# Patient Record
Sex: MALE | Race: BLACK OR AFRICAN AMERICAN | NOT HISPANIC OR LATINO | Employment: FULL TIME | URBAN - METROPOLITAN AREA
[De-identification: names, ages, dates, MRNs, and addresses within clinical notes are randomized per-mention and may not be internally consistent; named-entity substitution may affect disease eponyms.]

---

## 2017-11-10 ENCOUNTER — APPOINTMENT (EMERGENCY)
Dept: RADIOLOGY | Facility: HOSPITAL | Age: 52
End: 2017-11-10
Payer: COMMERCIAL

## 2017-11-10 ENCOUNTER — HOSPITAL ENCOUNTER (EMERGENCY)
Facility: HOSPITAL | Age: 52
Discharge: HOME | End: 2017-11-10
Attending: EMERGENCY MEDICINE | Admitting: EMERGENCY MEDICINE
Payer: COMMERCIAL

## 2017-11-10 VITALS
TEMPERATURE: 98.5 F | HEART RATE: 79 BPM | RESPIRATION RATE: 18 BRPM | DIASTOLIC BLOOD PRESSURE: 70 MMHG | WEIGHT: 220 LBS | OXYGEN SATURATION: 97 % | BODY MASS INDEX: 25.98 KG/M2 | HEIGHT: 77 IN | SYSTOLIC BLOOD PRESSURE: 128 MMHG

## 2017-11-10 DIAGNOSIS — K21.9 GERD (GASTROESOPHAGEAL REFLUX DISEASE): Primary | ICD-10-CM

## 2017-11-10 LAB
ALBUMIN SERPL BCP-MCNC: 3.3 G/DL (ref 3.5–5)
ALP SERPL-CCNC: 57 U/L (ref 46–116)
ALT SERPL W P-5'-P-CCNC: 31 U/L (ref 12–78)
ANION GAP SERPL CALCULATED.3IONS-SCNC: 9 MMOL/L (ref 4–13)
APTT PPP: 30 SECONDS (ref 23–35)
AST SERPL W P-5'-P-CCNC: 21 U/L (ref 5–45)
BASOPHILS # BLD AUTO: 0 THOUSANDS/ΜL (ref 0–0.1)
BASOPHILS NFR BLD AUTO: 0 % (ref 0–1)
BILIRUB SERPL-MCNC: 0.6 MG/DL (ref 0.2–1)
BUN SERPL-MCNC: 12 MG/DL (ref 5–25)
CALCIUM SERPL-MCNC: 9.1 MG/DL (ref 8.3–10.1)
CHLORIDE SERPL-SCNC: 100 MMOL/L (ref 100–108)
CO2 SERPL-SCNC: 26 MMOL/L (ref 21–32)
CREAT SERPL-MCNC: 1.11 MG/DL (ref 0.6–1.3)
DEPRECATED D DIMER PPP: 650 NG/ML (FEU) (ref 190–520)
EOSINOPHIL # BLD AUTO: 0.1 THOUSAND/ΜL (ref 0–0.61)
EOSINOPHIL NFR BLD AUTO: 1 % (ref 0–6)
ERYTHROCYTE [DISTWIDTH] IN BLOOD BY AUTOMATED COUNT: 14.6 % (ref 11.6–15.1)
GFR SERPL CREATININE-BSD FRML MDRD: 88 ML/MIN/1.73SQ M
GLUCOSE SERPL-MCNC: 118 MG/DL (ref 65–140)
HCT VFR BLD AUTO: 43.8 % (ref 42–52)
HGB BLD-MCNC: 14.1 G/DL (ref 14–18)
INR PPP: 1.04 (ref 0.86–1.16)
LIPASE SERPL-CCNC: 150 U/L (ref 73–393)
LYMPHOCYTES # BLD AUTO: 2 THOUSANDS/ΜL (ref 0.6–4.47)
LYMPHOCYTES NFR BLD AUTO: 20 % (ref 14–44)
MCH RBC QN AUTO: 27.5 PG (ref 27–31)
MCHC RBC AUTO-ENTMCNC: 32.2 G/DL (ref 31.4–37.4)
MCV RBC AUTO: 85 FL (ref 82–98)
MONOCYTES # BLD AUTO: 0.5 THOUSAND/ΜL (ref 0.17–1.22)
MONOCYTES NFR BLD AUTO: 5 % (ref 4–12)
NEUTROPHILS # BLD AUTO: 7.7 THOUSANDS/ΜL (ref 1.85–7.62)
NEUTS SEG NFR BLD AUTO: 75 % (ref 43–75)
NRBC BLD AUTO-RTO: 0 /100 WBCS
PLATELET # BLD AUTO: 232 THOUSANDS/UL (ref 130–400)
PMV BLD AUTO: 7.5 FL (ref 8.9–12.7)
POTASSIUM SERPL-SCNC: 3.4 MMOL/L (ref 3.5–5.3)
PROT SERPL-MCNC: 7.5 G/DL (ref 6.4–8.2)
PROTHROMBIN TIME: 10.9 SECONDS (ref 9.4–11.7)
RBC # BLD AUTO: 5.12 MILLION/UL (ref 4.7–6.1)
SODIUM SERPL-SCNC: 135 MMOL/L (ref 136–145)
TROPONIN I SERPL-MCNC: <0.02 NG/ML
WBC # BLD AUTO: 10.4 THOUSAND/UL (ref 4.8–10.8)

## 2017-11-10 PROCEDURE — 71010 HB CHEST X-RAY 1 VIEW FRONTAL (PORTABLE): CPT

## 2017-11-10 PROCEDURE — 74175 CTA ABDOMEN W/CONTRAST: CPT

## 2017-11-10 PROCEDURE — 99285 EMERGENCY DEPT VISIT HI MDM: CPT

## 2017-11-10 PROCEDURE — 85025 COMPLETE CBC W/AUTO DIFF WBC: CPT | Performed by: EMERGENCY MEDICINE

## 2017-11-10 PROCEDURE — 93005 ELECTROCARDIOGRAM TRACING: CPT | Performed by: EMERGENCY MEDICINE

## 2017-11-10 PROCEDURE — 80053 COMPREHEN METABOLIC PANEL: CPT | Performed by: EMERGENCY MEDICINE

## 2017-11-10 PROCEDURE — 36415 COLL VENOUS BLD VENIPUNCTURE: CPT | Performed by: EMERGENCY MEDICINE

## 2017-11-10 PROCEDURE — C9113 INJ PANTOPRAZOLE SODIUM, VIA: HCPCS | Performed by: EMERGENCY MEDICINE

## 2017-11-10 PROCEDURE — 84484 ASSAY OF TROPONIN QUANT: CPT | Performed by: EMERGENCY MEDICINE

## 2017-11-10 PROCEDURE — 85610 PROTHROMBIN TIME: CPT | Performed by: EMERGENCY MEDICINE

## 2017-11-10 PROCEDURE — 85730 THROMBOPLASTIN TIME PARTIAL: CPT | Performed by: EMERGENCY MEDICINE

## 2017-11-10 PROCEDURE — 83690 ASSAY OF LIPASE: CPT | Performed by: EMERGENCY MEDICINE

## 2017-11-10 PROCEDURE — 96361 HYDRATE IV INFUSION ADD-ON: CPT

## 2017-11-10 PROCEDURE — 85379 FIBRIN DEGRADATION QUANT: CPT | Performed by: EMERGENCY MEDICINE

## 2017-11-10 PROCEDURE — 71275 CT ANGIOGRAPHY CHEST: CPT

## 2017-11-10 PROCEDURE — 96374 THER/PROPH/DIAG INJ IV PUSH: CPT

## 2017-11-10 RX ORDER — PANTOPRAZOLE SODIUM 20 MG/1
40 TABLET, DELAYED RELEASE ORAL DAILY
Qty: 10 TABLET | Refills: 0 | Status: ON HOLD | OUTPATIENT
Start: 2017-11-10 | End: 2017-12-22

## 2017-11-10 RX ORDER — MAGNESIUM HYDROXIDE/ALUMINUM HYDROXICE/SIMETHICONE 120; 1200; 1200 MG/30ML; MG/30ML; MG/30ML
30 SUSPENSION ORAL ONCE
Status: COMPLETED | OUTPATIENT
Start: 2017-11-10 | End: 2017-11-10

## 2017-11-10 RX ORDER — PANTOPRAZOLE SODIUM 40 MG/1
40 INJECTION, POWDER, FOR SOLUTION INTRAVENOUS ONCE
Status: COMPLETED | OUTPATIENT
Start: 2017-11-10 | End: 2017-11-10

## 2017-11-10 RX ORDER — ONDANSETRON 2 MG/ML
4 INJECTION INTRAMUSCULAR; INTRAVENOUS ONCE
Status: COMPLETED | OUTPATIENT
Start: 2017-11-10 | End: 2017-11-10

## 2017-11-10 RX ADMIN — ONDANSETRON 4 MG: 2 INJECTION INTRAMUSCULAR; INTRAVENOUS at 11:19

## 2017-11-10 RX ADMIN — IOHEXOL 100 ML: 350 INJECTION, SOLUTION INTRAVENOUS at 11:56

## 2017-11-10 RX ADMIN — LIDOCAINE HYDROCHLORIDE 15 ML: 20 SOLUTION ORAL; TOPICAL at 13:51

## 2017-11-10 RX ADMIN — ALUMINUM HYDROXIDE, MAGNESIUM HYDROXIDE, AND SIMETHICONE 30 ML: 200; 200; 20 SUSPENSION ORAL at 11:20

## 2017-11-10 RX ADMIN — PANTOPRAZOLE SODIUM 40 MG: 40 INJECTION, POWDER, FOR SOLUTION INTRAVENOUS at 13:52

## 2017-11-10 RX ADMIN — SODIUM CHLORIDE 1000 ML: 0.9 INJECTION, SOLUTION INTRAVENOUS at 10:53

## 2017-11-10 NOTE — DISCHARGE INSTRUCTIONS
Gastroesophageal Reflux Disease   WHAT YOU NEED TO KNOW:   Gastroesophageal reflux occurs when acid and food in the stomach back up into the esophagus  Gastroesophageal reflux disease (GERD) is reflux that occurs more than twice a week for a few weeks  It usually causes heartburn and other symptoms  GERD can cause other health problems over time if it is not treated  DISCHARGE INSTRUCTIONS:   Seek care immediately if:   · You feel full and cannot burp or vomit  · You have severe chest pain and sudden trouble breathing  · Your bowel movements are black, bloody, or tarry-looking  · Your vomit looks like coffee grounds or has blood in it  Contact your healthcare provider if:   · You vomit large amounts, or you vomit often  · You have trouble breathing after you vomit  · You have trouble swallowing, or pain with swallowing  · You are losing weight without trying  · Your symptoms get worse or do not improve with treatment  · You have questions or concerns about your condition or care  Medicines:   · Medicines  are used to decrease stomach acid  Medicine may also be used to help your lower esophageal sphincter and stomach contract (tighten) more  · Take your medicine as directed  Contact your healthcare provider if you think your medicine is not helping or if you have side effects  Tell him or her if you are allergic to any medicine  Keep a list of the medicines, vitamins, and herbs you take  Include the amounts, and when and why you take them  Bring the list or the pill bottles to follow-up visits  Carry your medicine list with you in case of an emergency  Manage GERD:   · Do not have foods or drinks that may increase heartburn  These include chocolate, peppermint, fried or fatty foods, drinks that contain caffeine, or carbonated drinks (soda)  Other foods include spicy foods, onions, tomatoes, and tomato-based foods   Do not have foods or drinks that can irritate your esophagus, Message forwarded to Adina in billing regarding shared success program.  Patient does not meet or qualify for the shared success program.   such as citrus fruits, juices, and alcohol  · Do not eat large meals  When you eat a lot of food at one time, your stomach needs more acid to digest it  Eat 6 small meals each day instead of 3 large ones, and eat slowly  Do not eat meals 2 to 3 hours before bedtime  · Elevate the head of your bed  Place 6-inch blocks under the head of your bed frame  You may also use more than one pillow under your head and shoulders while you sleep  · Maintain a healthy weight  If you are overweight, weight loss may help relieve symptoms of GERD  · Do not smoke  Smoking weakens the lower esophageal sphincter and increases the risk of GERD  Ask your healthcare provider for information if you currently smoke and need help to quit  E-cigarettes or smokeless tobacco still contain nicotine  Talk to your healthcare provider before you use these products  · Do not wear clothing that is tight around your waist   Tight clothing can put pressure on your stomach and cause or worsen GERD symptoms  Follow up with your healthcare provider as directed:  Write down your questions so you remember to ask them during your visits  © 2017 [employer] Information is for End User's use only and may not be sold, redistributed or otherwise used for commercial purposes  All illustrations and images included in CareNotes® are the copyrighted property of A D A M , Inc  or Colby Pinto  The above information is an  only  It is not intended as medical advice for individual conditions or treatments  Talk to your doctor, nurse or pharmacist before following any medical regimen to see if it is safe and effective for you

## 2017-11-10 NOTE — ED PROCEDURE NOTE
PROCEDURE  ECG 12 Lead Documentation  Date/Time: 11/10/2017 10:47 AM  Performed by: Janay Norris  Authorized by: Janay Norris     ECG reviewed by me, the ED Provider: yes    Patient location:  ED  Interpretation:     Interpretation: normal    Rate:     ECG rate:  88    ECG rate assessment: normal    Rhythm:     Rhythm: sinus rhythm    Ectopy:     Ectopy: none    QRS:     QRS axis:  Normal    QRS intervals:  Normal  Conduction:     Conduction: normal    ST segments:     ST segments:  Normal  T waves:     T waves: normal

## 2017-11-10 NOTE — ED PROVIDER NOTES
History  Chief Complaint   Patient presents with    Chest Pain     pt c/o mid chest pain that radiates to stomach & SOB since Monday  c/o being "gassy" since onset  Patient presents for evaluation of epigastric pain radiating to the chest and around to the back since Monday  Patient reports increased gas and bleching gives temporary relief  No other modifying factors  Has not taken anything for the pain  No dyspnea  History provided by:  Patient   used: No    Chest Pain   Associated symptoms: abdominal pain        None       History reviewed  No pertinent past medical history  History reviewed  No pertinent surgical history  History reviewed  No pertinent family history  I have reviewed and agree with the history as documented  Social History   Substance Use Topics    Smoking status: Current Every Day Smoker    Smokeless tobacco: Never Used    Alcohol use Yes        Review of Systems   Cardiovascular: Positive for chest pain  Gastrointestinal: Positive for abdominal pain  All other systems reviewed and are negative  Physical Exam  ED Triage Vitals [11/10/17 1038]   Temperature Pulse Respirations Blood Pressure SpO2   98 5 °F (36 9 °C) 92 20 156/89 96 %      Temp Source Heart Rate Source Patient Position - Orthostatic VS BP Location FiO2 (%)   Oral Monitor Lying Left arm --      Pain Score       6           Orthostatic Vital Signs  Vitals:    11/10/17 1038   BP: 156/89   Pulse: 92   Patient Position - Orthostatic VS: Lying       Physical Exam   Constitutional: He is oriented to person, place, and time  No distress  HENT:   Mouth/Throat: Oropharynx is clear and moist    Eyes: Pupils are equal, round, and reactive to light  Neck: Normal range of motion  Cardiovascular: Normal rate, regular rhythm and intact distal pulses  Pulmonary/Chest: Effort normal and breath sounds normal  No respiratory distress  Abdominal: Soft   He exhibits no distension and no mass  There is tenderness  There is no rebound  Mild epigastric tenderness   Musculoskeletal: Normal range of motion  Neurological: He is alert and oriented to person, place, and time  Skin: Capillary refill takes less than 2 seconds  He is not diaphoretic  Nursing note and vitals reviewed  ED Medications  Medications   ondansetron (ZOFRAN) injection 4 mg (4 mg Intravenous Given 11/10/17 1119)   aluminum-magnesium hydroxide-simethicone (MYLANTA) 200-200-20 mg/5 mL oral suspension 30 mL (30 mL Oral Given 11/10/17 1120)   sodium chloride 0 9 % bolus 1,000 mL (0 mL Intravenous Stopped 11/10/17 1207)   iohexol (OMNIPAQUE) 350 MG/ML injection (MULTI-DOSE) 100 mL (100 mL Intravenous Given 11/10/17 1156)   pantoprazole (PROTONIX) injection 40 mg (40 mg Intravenous Given 11/10/17 1352)   lidocaine viscous (XYLOCAINE) 2 % mucosal solution 15 mL (15 mL Swish & Swallow Given 11/10/17 1351)       Diagnostic Studies  Results Reviewed     Procedure Component Value Units Date/Time    Troponin I [[id number]]  (Normal) Collected:  11/10/17 1052    Lab Status:  Final result Specimen:  Blood from Arm, Left Updated:  11/10/17 1142     Troponin I <0 02 ng/mL     Narrative:         [employer] Chemistry analyzer 99% cutoff is > 0 04 ng/mL in network labs    o cTnI 99% cutoff is useful only when applied to patients in the clinical setting of myocardial ischemia  o cTnI 99% cutoff should be interpreted in the context of clinical history, ECG findings and possibly cardiac imaging to establish correct diagnosis  o cTnI 99% cutoff may be suggestive but clearly not indicative of a coronary event without the clinical setting of myocardial ischemia      Comprehensive metabolic panel [[id number]]  (Abnormal) Collected:  11/10/17 1052    Lab Status:  Final result Specimen:  Blood from Arm, Left Updated:  11/10/17 1137     Sodium 135 (L) mmol/L      Potassium 3 4 (L) mmol/L      Chloride 100 mmol/L      CO2 26 mmol/L      Anion Gap 9 mmol/L BUN 12 mg/dL      Creatinine 1 11 mg/dL      Glucose 118 mg/dL      Calcium 9 1 mg/dL      AST 21 U/L      ALT 31 U/L      Alkaline Phosphatase 57 U/L      Total Protein 7 5 g/dL      Albumin 3 3 (L) g/dL      Total Bilirubin 0 60 mg/dL      eGFR 88 ml/min/1 73sq m     Narrative:         National Kidney Disease Education Program recommendations are as follows:  GFR calculation is accurate only with a steady state creatinine  Chronic Kidney disease less than 60 ml/min/1 73 sq  meters  Kidney failure less than 15 ml/min/1 73 sq  meters  Lipase [[id number]]  (Normal) Collected:  11/10/17 1052    Lab Status:  Final result Specimen:  Blood from Arm, Left Updated:  11/10/17 1137     Lipase 150 u/L     Protime-INR [[id number]]  (Normal) Collected:  11/10/17 1052    Lab Status:  Final result Specimen:  Blood from Arm, Left Updated:  11/10/17 1136     Protime 10 9 seconds      INR 1 04    APTT [[id number]]  (Normal) Collected:  11/10/17 1052    Lab Status:  Final result Specimen:  Blood from Arm, Left Updated:  11/10/17 1136     PTT 30 seconds     Narrative:          Therapeutic Heparin Range = 60-90 seconds    D-Dimer [[id number]]  (Abnormal) Collected:  11/10/17 1052    Lab Status:  Final result Specimen:  Blood from Arm, Left Updated:  11/10/17 1136     D-Dimer, Quant 650 (H) ng/ml (FEU)     CBC and differential [[id number]]  (Abnormal) Collected:  11/10/17 1052    Lab Status:  Final result Specimen:  Blood from Arm, Left Updated:  11/10/17 1114     WBC 10 40 Thousand/uL      RBC 5 12 Million/uL      Hemoglobin 14 1 g/dL      Hematocrit 43 8 %      MCV 85 fL      MCH 27 5 pg      MCHC 32 2 g/dL      RDW 14 6 %      MPV 7 5 (L) fL      Platelets 860 Thousands/uL      nRBC 0 /100 WBCs      Neutrophils Relative 75 %      Lymphocytes Relative 20 %      Monocytes Relative 5 %      Eosinophils Relative 1 %      Basophils Relative 0 %      Neutrophils Absolute 7 70 (H) Thousands/µL      Lymphocytes Absolute 2 00 Thousands/µL Monocytes Absolute 0 50 Thousand/µL      Eosinophils Absolute 0 10 Thousand/µL      Basophils Absolute 0 00 Thousands/µL                  XR chest 1 view portable   Final Result by Amanda Dawson DO (11/10 1312)      No active pulmonary disease  Workstation performed: [id number]         CTA dissection protocol chest and abdomen   Final Result by Becca Sandy MD (11/10 1300)      No evidence of thoracic aortic dissection and no evidence of pulmonary embolism   Centrilobular emphysema      Thickening of the thoracic esophagus noted  This is of unclear etiology may be due to reflux disease / esophagitis  Correlation with the upper GI endoscopy suggested for definite characterization    ##imslh##imslh       ##fuslh3##fuslh3         Workstation performed: [id number]                    Procedures  Procedures       Phone Contacts  ED Phone Contact    ED Course  ED Course                                MDM  Number of Diagnoses or Management Options  GERD (gastroesophageal reflux disease):   Diagnosis management comments: ER work up was remarkable for esophagitis on CT scan  Base don history and physical symptoms were consistent with GERD being the source of his symptoms  Some relief with medication sin ER  Advised follow up with PMD and GI  At this time I do not feel his symptoms are cardiac          Amount and/or Complexity of Data Reviewed  Clinical lab tests: ordered and reviewed  Tests in the radiology section of CPT®: ordered and reviewed  Independent visualization of images, tracings, or specimens: yes    Patient Progress  Patient progress: stable    CritCare Time    Disposition  Final diagnoses:   GERD (gastroesophageal reflux disease)     Time reflects when diagnosis was documented in both MDM as applicable and the Disposition within this note     Time User Action Codes Description Comment    11/10/2017  1:28 PM Irineo Millan Ettataneelima [K21 9] GERD (gastroesophageal reflux disease)       ED Disposition ED Disposition Condition Comment    Discharge  Miguel Doss discharge to home/self care  Condition at discharge: stable        Follow-up Information     Follow up With Specialties Details Why Camille Teague MD Gastroenterology In 3 days  [address]  [phone]      [employer]   [phone]          Patient's Medications   Discharge Prescriptions    PANTOPRAZOLE (PROTONIX) 20 MG TABLET    Take 2 tablets by mouth daily for 10 doses       Start Date: 11/10/2017End Date: 11/20/2017       Order Dose: 40 mg       Quantity: 10 tablet    Refills: 0     No discharge procedures on file      ED Provider  Electronically Signed by           Lamonte Curry DO  11/10/17 9544

## 2017-11-10 NOTE — ED NOTES
Pt c/o constant  mid chest pain radiating to abdomen & sometimes to his side (pointing to right side) since Monday       Naz Flowers RN  11/10/17 5823

## 2017-11-13 ENCOUNTER — GENERIC CONVERSION - ENCOUNTER (OUTPATIENT)
Dept: OTHER | Facility: OTHER | Age: 52
End: 2017-11-13

## 2017-11-13 LAB
ATRIAL RATE: 88 BPM
P AXIS: 55 DEGREES
PR INTERVAL: 156 MS
QRS AXIS: 48 DEGREES
QRSD INTERVAL: 96 MS
QT INTERVAL: 358 MS
QTC INTERVAL: 433 MS
T WAVE AXIS: 29 DEGREES
VENTRICULAR RATE: 88 BPM

## 2017-11-15 ENCOUNTER — GENERIC CONVERSION - ENCOUNTER (OUTPATIENT)
Dept: OTHER | Facility: OTHER | Age: 52
End: 2017-11-15

## 2017-11-21 ENCOUNTER — GENERIC CONVERSION - ENCOUNTER (OUTPATIENT)
Dept: OTHER | Facility: OTHER | Age: 52
End: 2017-11-21

## 2017-11-21 ENCOUNTER — ALLSCRIPTS OFFICE VISIT (OUTPATIENT)
Dept: OTHER | Facility: OTHER | Age: 52
End: 2017-11-21

## 2017-11-22 NOTE — CONSULTS
Assessment  1  Tinea pedis (110 4) (B35 3)   2  Onychomycosis (110 1) (B35 1)   3  Acquired hallux valgus of both feet (735 0) (M20 11,M20 12)    Plan     · Clotrimazole-Betamethasone 1-0 05 % External Cream; APPLY  AND RUB  IN ATHIN FILM TO AFFECTED AREAS TWICE DAILY  (AM AND PM)   · Terbinafine HCl - 250 MG Oral Tablet; TAKE 1 TABLET DAILY   · Follow-up visit in 1 month Evaluation and Treatment  Follow-up  Status: Hold For -Scheduling  Requested for: [id number]   · It is important to take good care of your feet ; Status:Complete;   Done: [id number]    Discussion/Summary    Patient will not start oral medication until this is cleared by GI discussed risks of GI upset and GI bleed  Discussed side effects of medication including risk of liver damage  Liver function reviewed within normal limits  Patient will apply antifungal cream twice a day and anti fungal powder daily  Patient will apply Lysol spray his shoes at night  Patient will not drink alcohol on Medication  The patient was counseled regarding instructions for management,-- patient and family education  Chief Complaint  Patient has had chronic itching peeling and blistering of feet are off for the last 6 years  Has tried numerous over-the-counter topical medicines and creams  Patient also has nail fungus for the past several years  Patient has large bunions bilateral but they are rarely painful  Patient has recently also be getting some blisters on hands  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  History of Present Illness  HPI: Patient has a family history of liver cancer  Patient has stopped drinking since he has had peptic ulcer disease  Patient has been started Prilosec  Patient has a follow-up with GI doctor next week  Active Problems  1  Abdominal pain (789 00) (R10 9)   2  Change in bowel habits (787 99) (R19 4)   3  Colon cancer screening (V76 51) (Z12 11)   4   GERD (gastroesophageal reflux disease) (530 81) (K21 9)   5  Nausea and/or vomiting (787 01) (R11 2)   6  Nicotine dependence (305 1) (F17 200)   7  Palpitations (785 1) (R00 2)   8  Rash (782 1) (R21)   9  Shortness of breath (786 05) (R06 02)    Surgical History   · Denied: History Of Prior Surgery    Family History  Mother    · Denied: Family history of colon cancer   · Denied: Family history of colonic polyps   · Family history of liver cancer (V16 0) (Z80 0)   · Denied: Family history of liver disease  Father    · Denied: Family history of colon cancer   · Denied: Family history of colonic polyps   · Family history of liver cancer (V16 0) (Z80 0)   · Denied: Family history of liver disease    Social History     · Current every day smoker (305 1) (F17 200)   · Feels safe at home   · Social alcohol use (Z78 9)    Current Meds   1  Pantoprazole Sodium 40 MG Oral Tablet Delayed Release; take 1 tablet every twelve hours; Therapy: 82XWC1409 to (Evaluate:03Gav4944)  Requested for: 01CGW4490; Last Rx:46Tdh9027 Ordered   2  Suprep Bowel Prep Kit 17 5-3 13-1 6 GM/180ML Oral Solution; DILUTE CONTENTS AND USE AS DIRECTED FOR BOWEL PREP; Therapy: 84JRB9019 to (Last Rx:22Fft0668)  Requested for: 87JMX3829 Ordered    The medication list was reviewed and updated today  Allergies  1  No Known Drug Allergies    Vitals   Recorded: 65ZKK1776 25:63IY   Systolic 286   Diastolic 77   Height 5 ft 5 in   Weight 215 lb    BMI Calculated 35 78   BSA Calculated 2 04     Physical Exam  Left Foot: Appearance: Normal except as noted: excessive pronation-- and-- pes planus  Great toe deformities include a bunion  There are resolving blisters of plantar feet bilateral  Itching peeling and redness chronic tinea pedis thickening of plantar skin  Special Tests: moderate onychomycosis there is thickening and dystrophy toenails  Right Foot: Appearance: Normal except as noted: excessive pronation-- and-- pes planus  Great toe deformities include a bunion   Significant bunion bilateral decreased range of motion 1st MPJ no pain on range of motion  Neurological Exam: performed  Light touch was intact bilaterally  Vibratory sensation was intact bilaterally  Vascular Exam: performed Dorsalis pedis pulses were 2/4 bilaterally  Posterior tibial pulses were 1/4 bilaterally  Capillary refill time was between 1-3 seconds bilaterally  Future Appointments    Date/Time Provider Specialty Site   12/18/2017 03:30 PM Sumaya Ritchie MD [address] [facility]   12/22/2017 10:30 AM TANNER Kearns  Gastroenterology [facility]       Signatures   Electronically signed by :  Alix Leon DPM; Nov 21 2017 10:39AM EST                       (Author)

## 2017-12-18 ENCOUNTER — ALLSCRIPTS OFFICE VISIT (OUTPATIENT)
Dept: OTHER | Facility: OTHER | Age: 52
End: 2017-12-18

## 2017-12-19 NOTE — PRE-PROCEDURE INSTRUCTIONS
Pre-Surgery Instructions:   Medication Instructions    pantoprazole (PROTONIX) 20 mg tablet Patient was instructed by Physician and understands

## 2017-12-21 ENCOUNTER — ANESTHESIA EVENT (OUTPATIENT)
Dept: GASTROENTEROLOGY | Facility: AMBULARY SURGERY CENTER | Age: 52
End: 2017-12-21
Payer: COMMERCIAL

## 2017-12-21 NOTE — ANESTHESIA PREPROCEDURE EVALUATION
Review of Systems/Medical History  Patient summary reviewed  Chart reviewed      Cardiovascular   Pulmonary  Smoker cigarette smoker , ,        GI/Hepatic    GERD ,             Endo/Other     GYN       Hematology   Musculoskeletal       Neurology   Psychology           Physical Exam    Airway    Mallampati score: II  TM Distance: >3 FB  Neck ROM: full     Dental   upper dentures and lower dentures,     Cardiovascular  Rhythm: regular, Rate: normal,     Pulmonary  Breath sounds clear to auscultation,     Other Findings        Anesthesia Plan  ASA Score- 2     Anesthesia Type- IV sedation with anesthesia with ASA Monitors  Additional Monitors:   Airway Plan:         Plan Factors-    Induction- intravenous  Postoperative Plan-     Informed Consent- Anesthetic plan and risks discussed with patient  I personally reviewed this patient with the CRNA  Discussed and agreed on the Anesthesia Plan with the CRNA  Amy Yu

## 2017-12-22 ENCOUNTER — GENERIC CONVERSION - ENCOUNTER (OUTPATIENT)
Dept: OTHER | Facility: OTHER | Age: 52
End: 2017-12-22

## 2017-12-22 ENCOUNTER — GENERIC CONVERSION - ENCOUNTER (OUTPATIENT)
Dept: GASTROENTEROLOGY | Facility: CLINIC | Age: 52
End: 2017-12-22

## 2017-12-22 ENCOUNTER — ALLSCRIPTS OFFICE VISIT (OUTPATIENT)
Dept: OTHER | Facility: OTHER | Age: 52
End: 2017-12-22

## 2017-12-22 ENCOUNTER — ANESTHESIA (OUTPATIENT)
Dept: GASTROENTEROLOGY | Facility: AMBULARY SURGERY CENTER | Age: 52
End: 2017-12-22
Payer: COMMERCIAL

## 2017-12-22 ENCOUNTER — HOSPITAL ENCOUNTER (OUTPATIENT)
Facility: AMBULARY SURGERY CENTER | Age: 52
Setting detail: OUTPATIENT SURGERY
Discharge: HOME | End: 2017-12-22
Attending: INTERNAL MEDICINE | Admitting: INTERNAL MEDICINE
Payer: COMMERCIAL

## 2017-12-22 VITALS
DIASTOLIC BLOOD PRESSURE: 91 MMHG | HEART RATE: 63 BPM | RESPIRATION RATE: 18 BRPM | TEMPERATURE: 96.1 F | SYSTOLIC BLOOD PRESSURE: 133 MMHG | OXYGEN SATURATION: 99 % | WEIGHT: 218 LBS | BODY MASS INDEX: 25.85 KG/M2

## 2017-12-22 DIAGNOSIS — K21.9 GASTRO-ESOPHAGEAL REFLUX DISEASE WITHOUT ESOPHAGITIS: ICD-10-CM

## 2017-12-22 DIAGNOSIS — R19.4 CHANGE IN BOWEL HABITS: ICD-10-CM

## 2017-12-22 PROCEDURE — 88305 TISSUE EXAM BY PATHOLOGIST: CPT | Performed by: INTERNAL MEDICINE

## 2017-12-22 PROCEDURE — 88342 IMHCHEM/IMCYTCHM 1ST ANTB: CPT | Performed by: INTERNAL MEDICINE

## 2017-12-22 RX ORDER — PROPOFOL 10 MG/ML
INJECTION, EMULSION INTRAVENOUS AS NEEDED
Status: DISCONTINUED | OUTPATIENT
Start: 2017-12-22 | End: 2017-12-22 | Stop reason: SURG

## 2017-12-22 RX ORDER — PANTOPRAZOLE SODIUM 20 MG/1
20 TABLET, DELAYED RELEASE ORAL DAILY
COMMUNITY
End: 2018-12-21

## 2017-12-22 RX ORDER — SODIUM CHLORIDE 9 MG/ML
75 INJECTION, SOLUTION INTRAVENOUS CONTINUOUS
Status: DISCONTINUED | OUTPATIENT
Start: 2017-12-22 | End: 2017-12-22 | Stop reason: HOSPADM

## 2017-12-22 RX ADMIN — SODIUM CHLORIDE 75 ML/HR: 0.9 INJECTION, SOLUTION INTRAVENOUS at 09:58

## 2017-12-22 RX ADMIN — PROPOFOL 100 MG: 10 INJECTION, EMULSION INTRAVENOUS at 10:43

## 2017-12-22 RX ADMIN — PROPOFOL 50 MG: 10 INJECTION, EMULSION INTRAVENOUS at 10:48

## 2017-12-22 RX ADMIN — PROPOFOL 50 MG: 10 INJECTION, EMULSION INTRAVENOUS at 10:58

## 2017-12-22 RX ADMIN — PROPOFOL 100 MG: 10 INJECTION, EMULSION INTRAVENOUS at 10:53

## 2017-12-22 NOTE — OP NOTE
Colonoscopy Procedure Note    Procedure: Colonoscopy    Sedation: Monitored anesthesia care, check anesthesia records      ASA Class: 2    INDICATIONS: Screening for Colon Cancer, family history of colon cancer  POST-OP DIAGNOSIS: See the impression below    Procedure Details     Prior colonoscopy: No prior colonoscopy  Informed consent was obtained for the procedure, including sedation  Risks of perforation, hemorrhage, adverse drug reaction and aspiration were discussed  The patient was placed in the left lateral decubitus position  Based on the pre-procedure assessment, including review of the patient's medical history, medications, allergies, and review of systems, he had been deemed to be an appropriate candidate for conscious sedation; he was therefore sedated with the medications listed below  The patient was monitored continuously with telemetry, pulse oximetry, blood pressure monitoring, and direct observations  A rectal examination was performed  The variable-stiffness pediatric colonoscope was inserted into the rectum and advanced under direct vision to the cecum, which was identified by the ileocecal valve and appendiceal orifice  The quality of the colonic preparation was good  A careful inspection was made as the colonoscope was withdrawn, including a retroflexed view of the rectum; findings and interventions are described below  Findings:  1   3-4 mm cecal polyp removed using biopsy forceps  2   3 mm ascending colon polyp removed using biopsy forceps  3   Mild sigmoid diverticulosis  4   Retroflexion was performed and revealed large internal hemorrhoids  Complications:  None; patient tolerated the procedure well  Impression:    1  Two colon polyps  2   Mild diverticulosis  3   Large internal hemorrhoids  Recommendations:  Repeat colonoscopy in 5 years or sooner if clinically indicated      Repeat colonoscopy is being recommended at an interval of less than 10 years, this is because of a family history of adenomatous polyps or colon cancer  Follow up biopsy results in 2-3 weeks

## 2017-12-22 NOTE — H&P
History and Physical -  [facility]  Magdiel Hall 47 y o  male MRN: [id number]    HPI: Magdiel Hall is a 47y o  year old male who presents with dyspepsia symptoms and colon cancer screening      Review of Systems    Historical Information   Past Medical History:   Diagnosis Date    GERD (gastroesophageal reflux disease)      Past Surgical History:   Procedure Laterality Date    NO PAST SURGERIES       Social History   History   Alcohol Use    Yes     Comment: socially     History   Drug Use No     History   Smoking Status    Current Every Day Smoker    Packs/day: 1 00    Years: 20 00   Smokeless Tobacco    Never Used     Family History   Problem Relation Age of Onset    Cancer Mother      stomach    Hypertension Mother     Cancer Father      colon    Hypertension Sister     No Known Problems Brother     No Known Problems Daughter     Leukemia Son     No Known Problems Brother     No Known Problems Daughter     No Known Problems Son        Meds/Allergies     Prescriptions Prior to Admission   Medication    pantoprazole (PROTONIX) 20 mg tablet       No Known Allergies    Objective     Blood pressure 121/87, pulse 75, temperature (!) 96 1 °F (35 6 °C), temperature source Tympanic, resp  rate 18, weight 98 9 kg (218 lb), SpO2 94 %  PHYSICAL EXAM    Gen: NAD  CV: RRR  CHEST: Clear  ABD: soft, NT/ND  EXT: no edema  Neuro: AAO      ASSESSMENT/PLAN:  This is a 47y o  year old male here for evaluation of dyspepsia symptoms and colon cancer screening    PLAN:   Procedure:  EGD and colonoscopy

## 2017-12-23 NOTE — PROGRESS NOTES
Assessment   1  GERD (gastroesophageal reflux disease) (289 59) (K21 9)    Plan   GERD (gastroesophageal reflux disease)    · Carafate 1 GM/10ML Oral Suspension; TAKE 10 ML 4 TIMES DAILY   · Princess - Candi Delgado MD, Ryanne Sullivan (Gastroenterology) Co-Management  Acid reflux refractory to PPi    administration  Status: Canceled  Care Summary provided  : Yes    Discussion/Summary      52-y/ o male presents for evaluation of increased epigastric pain likely 2/2 to acid reflux  Pain is refractory to pantoprazole 40 milligrams b i d  Patient has evaluation with Gastroenterology and scheduled EGD/colonoscopy  Differential includes peptic ulcer and will order Carafate 1 gram/10 milliliter q i d  for symptomatic relief  Will follow up results of EGD/colonoscopy  Patient to return to clinic if there is any worsening of symptoms  Possible side effects of new medications were reviewed with the patient/guardian today  The treatment plan was reviewed with the patient/guardian  The patient/guardian understands and agrees with the treatment plan       Self Referrals: No      Chief Complaint   patient sees GI next week but , still having problems with acid reflux , states medication no helping      History of Present Illness   52-y/ o male presents to office today for evaluation of ongoing acid reflux  Patient is on pantoprazole 40 milligrams b i d  Patient states that he is still having epigastric pain and reflux  patient is adhering to a strict bland diet  Patient has been evaluated by Gastroenterology and states that he has an EGD/colonoscopy scheduled for early next week  Review of Systems        Constitutional: No fever or chills, feels well, no tiredness, no recent weight gain or weight loss  Cardiovascular: no chest pain-- and-- no palpitations  Respiratory: no shortness of breath,-- no cough,-- no wheezing-- and-- no shortness of breath during exertion        Gastrointestinal: abdominal pain-- and-- nausea, but-- no vomiting,-- no constipation-- and-- no diarrhea  ROS reviewed  Active Problems   1  Abdominal pain (789 00) (R10 9)   2  Acquired hallux valgus of both feet (735 0) (M20 11,M20 12)   3  Change in bowel habits (787 99) (R19 4)   4  Colon cancer screening (V76 51) (Z12 11)   5  GERD (gastroesophageal reflux disease) (530 81) (K21 9)   6  Nausea and/or vomiting (787 01) (R11 2)   7  Nicotine dependence (305 1) (F17 200)   8  Onychomycosis (110 1) (B35 1)   9  Palpitations (785 1) (R00 2)   10  Rash (782 1) (R21)   11  Shortness of breath (786 05) (R06 02)   12  Tinea pedis (110 4) (B35 3)    Past Medical History      The active problems and past medical history were reviewed and updated today  Surgical History   1  Denied: History Of Prior Surgery     The surgical history was reviewed and updated today  Family History   Mother    1  Denied: Family history of colon cancer   2  Denied: Family history of colonic polyps   3  Family history of liver cancer (V16 0) (Z80 0)   4  Denied: Family history of liver disease  Father    11  Denied: Family history of colon cancer   6  Denied: Family history of colonic polyps   7  Family history of liver cancer (V16 0) (Z80 0)   8  Denied: Family history of liver disease     The family history was reviewed and updated today  Social History    · Current every day smoker (305 1) (F17 200)   · Feels safe at home   · Social alcohol use (Z78 9)  The social history was reviewed and updated today  The social history was reviewed and is unchanged  Current Meds    1  Clotrimazole-Betamethasone 1-0 05 % External Cream; APPLY  AND RUB  IN A THIN     FILM TO AFFECTED AREAS TWICE DAILY  (AM AND PM); Therapy: 87OIU8078 to (Last Rx:21Nov2017)  Requested for: 21Nov2017 Ordered   2  Pantoprazole Sodium 40 MG Oral Tablet Delayed Release; take 1 tablet every twelve     hours;      Therapy: 60KHH5168 to (Evaluate:72Qeb0498)  Requested for: 09LVV7378; Last     Rx:15Nov2017 Ordered   3  Suprep Bowel Prep Kit 17 5-3 13-1 6 GM/180ML Oral Solution; DILUTE CONTENTS AND     USE AS DIRECTED FOR BOWEL PREP; Therapy: 43OPP5241 to (Last Rx:51Phf6593)  Requested for: 09IQY2843 Ordered   4  Terbinafine HCl - 250 MG Oral Tablet; TAKE 1 TABLET DAILY; Therapy: 80KTW9673 to (Evaluate:31Eus9282)  Requested for: 98HIY3302; Last     Rx:73Ifx5333 Ordered     The medication list was reviewed and updated today  Allergies   1  No Known Drug Allergies    Vitals   Vital Signs    Recorded: 53Ndg2715 04:01PM   Temperature 96 F   Heart Rate 86   Respiration 16   Systolic 631   Diastolic 90   Height 5 ft 5 in   Weight 217 lb    BMI Calculated 36 11   BSA Calculated 2 05   O2 Saturation 97     Physical Exam        Constitutional      General appearance: No acute distress, well appearing and well nourished  Pulmonary      Respiratory effort: No increased work of breathing or signs of respiratory distress  Cardiovascular      Auscultation of heart: Normal rate and rhythm, normal S1 and S2, without murmurs  Abdomen      Abdomen: Abnormal  -- Tenderness to palpation in the epigastric region, no distension, bowel sounds present and normoactive, no mass  Lymphatic      Palpation of lymph nodes in neck: No lymphadenopathy  Attending Note   Attending Note: I agree with the Resident management plan as it was presented to me  I agree with the Resident's note  Future Appointments      Date/Time Provider Specialty Site   12/22/2017 03:15 PM Xi Amaya DPM [facility]iat[facility]   12/22/2017 10:30 AM TANNER Maciel   Gastroenterology [facility]     Signatures    Electronically signed by : Warden Kady MD; Dec 21 2017  4:38PM EST                       (Author)     Electronically signed by : MARQUIS Hercules ; Dec 22 2017  9:06AM EST                       (Author)

## 2017-12-23 NOTE — PROGRESS NOTES
Assessment   1  Onychomycosis (110 1) (B35 1)   2  Tinea pedis (110 4) (B35 3)    Plan    · Clotrimazole-Betamethasone 1-0 05 % External Cream; APPLY  AND RUB  IN A    THIN FILM TO AFFECTED AREAS TWICE DAILY  (AM AND PM)   · Terbinafine HCl - 250 MG Oral Tablet; TAKE 1 TABLET DAILY   · Follow-up visit in 1 month Evaluation and Treatment  Follow-up  Status: Hold For -    Scheduling  Requested for: 33Ahr1209   · It is important to take good care of your feet ; Status:Complete;   Done: 96SZV2931    03:33PM    Discussion/Summary      Patient will confirm with GI doctor that it is okay for him to take the Lamisil will discontinue if he is having significant stomach pain  Will take medicine after eating  Patient will apply antifungal cream twice a day and apply antifungal powder between toes twice a day  Patient will be on medicine for 3-4 months for nail fungus  Patient call if any side effects medication  Discussed risk of liver damage  The patient was counseled regarding instructions for management,-- patient and family education  Chief Complaint   Patient has Been taking Lamisil  Patient does have history GERD but says the pill has not been irritating his stomach  Patient does have appointment for GI doctor  Patient has been taking it for a month  The peeling and erythema of the plantar feet is significantly improved  The does have nail fungus  History of Present Illness   HPI: Patient has a family history of liver cancer  Patient has stopped drinking since he has had peptic ulcer disease  Patient has been started Prilosec  Patient has a follow-up with GI doctor next week  Active Problems   1  Abdominal pain (789 00) (R10 9)   2  Acquired hallux valgus of both feet (735 0) (M20 11,M20 12)   3  Change in bowel habits (787 99) (R19 4)   4  Colon cancer screening (V76 51) (Z12 11)   5  GERD (gastroesophageal reflux disease) (530 81) (K21 9)   6  Nausea and/or vomiting (787 01) (R11 2)   7   Nicotine dependence (305 1) (F17 200)   8  Onychomycosis (110 1) (B35 1)   9  Palpitations (785 1) (R00 2)   10  Rash (782 1) (R21)   11  Shortness of breath (786 05) (R06 02)   12  Tinea pedis (110 4) (B35 3)    Surgical History    · Denied: History Of Prior Surgery    Family History   Mother    · Denied: Family history of colon cancer   · Denied: Family history of colonic polyps   · Family history of liver cancer (V16 0) (Z80 0)   · Denied: Family history of liver disease  Father    · Denied: Family history of colon cancer   · Denied: Family history of colonic polyps   · Family history of liver cancer (V16 0) (Z80 0)   · Denied: Family history of liver disease    Social History    · Current every day smoker (305 1) (F17 200)   · Feels safe at home   · Social alcohol use (Z78 9)    Current Meds    1  Carafate 1 GM/10ML Oral Suspension; TAKE 10 ML 4 TIMES DAILY; Therapy: 17KXB9154 to (Evaluate:46Kpn5385)  Requested for: 62XWT3671; Last     Rx:91Qqi4210 Ordered   2  Clotrimazole-Betamethasone 1-0 05 % External Cream; APPLY  AND RUB  IN A THIN     FILM TO AFFECTED AREAS TWICE DAILY  (AM AND PM); Therapy: 66YKK8867 to (Last Rx:21Nov2017)  Requested for: 21Nov2017 Ordered   3  Pantoprazole Sodium 40 MG Oral Tablet Delayed Release; take 1 tablet every twelve     hours; Therapy: 33LFA0804 to (Evaluate:56Wcg8622)  Requested for: 45AGS7943; Last     Rx:69Gdw3535 Ordered   4  Suprep Bowel Prep Kit 17 5-3 13-1 6 GM/180ML Oral Solution; DILUTE CONTENTS AND     USE AS DIRECTED FOR BOWEL PREP; Therapy: 51FAK6796 to (Last Rx:60Ggb7395)  Requested for: 32JJL5955 Ordered   5  Terbinafine HCl - 250 MG Oral Tablet; TAKE 1 TABLET DAILY; Therapy: 73QCS7361 to (Evaluate:01Vuz6586)  Requested for: 55PMQ1534; Last     Rx:21Nov2017 Ordered     The medication list was reviewed and updated today  Allergies   1   No Known Drug Allergies    Vitals    Recorded: 22Dec2017 03:23PM   Heart Rate 78   Respiration 17   Systolic 077 Diastolic 85   Height 5 ft 5 in   Weight 217 lb    BMI Calculated 36 11   BSA Calculated 2 05     Physical Exam   Left Foot: Appearance: Normal except as noted: excessive pronation-- and-- pes planus  Great toe deformities include a bunion  No blisters resolving tinea pedis  Negative erythema no signs of infection  Special Tests: moderate onychomycosis there is thickening and dystrophy toenails  Right Foot: Appearance: Normal except as noted: excessive pronation-- and-- pes planus  Great toe deformities include a bunion  Significant bunion bilateral decreased range of motion 1st MPJ no pain on range of motion  Neurological Exam: performed  Light touch was intact bilaterally  Vibratory sensation was intact bilaterally  Vascular Exam: performed Dorsalis pedis pulses were 2/4 bilaterally  Posterior tibial pulses were 1/4 bilaterally  Capillary refill time was between 1-3 seconds bilaterally  Signatures    Electronically signed by :  Izaiah Ziegler DPM; Dec 22 2017  3:35PM EST                       (Author)

## 2017-12-29 ENCOUNTER — GENERIC CONVERSION - ENCOUNTER (OUTPATIENT)
Dept: OTHER | Facility: OTHER | Age: 52
End: 2017-12-29

## 2018-01-16 ENCOUNTER — GENERIC CONVERSION - ENCOUNTER (OUTPATIENT)
Dept: OTHER | Facility: OTHER | Age: 53
End: 2018-01-16

## 2018-01-16 ENCOUNTER — ALLSCRIPTS OFFICE VISIT (OUTPATIENT)
Dept: OTHER | Facility: OTHER | Age: 53
End: 2018-01-16

## 2018-01-22 VITALS
BODY MASS INDEX: 36.15 KG/M2 | RESPIRATION RATE: 17 BRPM | HEIGHT: 65 IN | WEIGHT: 217 LBS | DIASTOLIC BLOOD PRESSURE: 85 MMHG | HEART RATE: 78 BPM | SYSTOLIC BLOOD PRESSURE: 137 MMHG

## 2018-01-22 VITALS
SYSTOLIC BLOOD PRESSURE: 120 MMHG | TEMPERATURE: 96 F | OXYGEN SATURATION: 97 % | DIASTOLIC BLOOD PRESSURE: 90 MMHG | RESPIRATION RATE: 16 BRPM | BODY MASS INDEX: 36.15 KG/M2 | HEART RATE: 86 BPM | HEIGHT: 65 IN | WEIGHT: 217 LBS

## 2018-01-23 NOTE — RESULT NOTES
Discussion/Summary   Gastric biopsies were negative for H  pylori  No need for repeat EGD  Colonoscopy shows adenomatous and serrated polyp, would recommend repeat colonoscopy in 3 years     Verified Results  (1) TISSUE EXAM [id number] 10:46AM Derrill Estimable     Test Name Result Flag Reference   LAB AP CASE REPORT (Report)     Surgical Pathology Report             Case: [id number]                   Authorizing Provider: Kecia Taylor MD    Collected:      12/22/2017 1046        Ordering Location:   [facility]  Received:      12/22/2017 [facility]                                     Pathologist:      Epifanio Quiles MD                              Specimens:  A) - Stomach, bx gastric body r/o h pylori                               B) - Large Intestine, Cecum, bx cecal polyp                              C) - Colon, bx ascending colon polyp   LAB AP FINAL DIAGNOSIS (Report)     A  Gastric body:  - Minimal chronic inactive antral & oxyntic gastritis, negative for   curvilinear Helicobacter pylori, confirmed by immunohistochemical stains,   evaluated with appropriate positive & negative controls  - No atrophy or intestinal metaplasia identified   - No epithelial dysplasia and no evidence of malignancy  B  Cecal polyp biopsy:  - No tissue is identified in the specimen container and no tissue is   present following histologic processing, embedding as well as H&E   staining  C  Ascending colon polyp:  - Tubular adenoma (adenomatous polyp) present in one submitted tissue   portion   - Serrated polyp, can't exclude sessile serrated adenoma, present in one   submitted tissue portion   - No high grade dysplasia and no evidence of malignancy      Interpretation performed at [address]    Electronically signed by Epifanio Quiles MD on 12/28/2017 at 5:01 PM   LAB AP SURGICAL ADDITIONAL INFORMATION (Report)     All controls performed with the immunohistochemical stains reported above   reacted appropriately  These tests were developed and their performance   characteristics determined by [facility] or   [facility]  They may not be cleared or approved by the [place]  Food and Drug Administration  The FDA has determined that such clearance   or approval is not necessary  These tests are used for clinical purposes  They should not be regarded as investigational or for research  This   laboratory has been approved by [place] [address], designated as a high-complexity   laboratory and is qualified to perform these tests  LAB AP GROSS DESCRIPTION (Report)     A  The specimen is received in formalin, labeled with the patient's name   and hospital number, and is designated biopsy gastric body rule out H   pylori, are multiple irregularly-shaped fragments of tan soft tissue   measuring 0 8 x 0 4 x 0 1 cm in aggregate  Entirely submitted  1   cassette  B  The specimen is received in formalin, labeled with the patient's name   and hospital number, and is designated biopsy cecal polyp  No tissue is   identified in the specimen container  The formalin is filtered through a   processing bag and entirely submitted  1 cassette  C  The specimen is received in formalin, labeled with the patient's name   and hospital number, and is designated biopsy ascending colon polyp, are   multiple irregularly-shaped fragments tan soft tissue measuring 0 6 x 0 4   x 0 1 cm in aggregate  Entirely submitted  1 cassette  Note: The estimated total formalin fixation time based upon information   provided by the submitting clinician and the standard processing schedule   is over 72 hours   Latasha Felipe

## 2018-01-23 NOTE — MISCELLANEOUS
Message  Return to work or school:   Shelly Louis is under my professional care  He was seen in my office on 1/16/18   He is able to return to work on  1/17/18    He is able to perform activities of daily living without limitations  , He can perform work without limitations  Weight Bearing Status: Weight-Bearing As Tolerated           Signatures   Electronically signed by : TANNER Virgen ; Jan 16 2018  9:14AM EST                       (Author)

## 2018-01-23 NOTE — MISCELLANEOUS
The polyps removed on colonoscopy were tubular adenoma and sessile serrated polyp, therefore would recommend a repeat colonoscopy in 3 years  Biopsies from EGD were negative for H pylori  There was evidence of gastritis however  Continue PPI b i d  for the next 2 months, followed by once daily  No need for repeat EGD, unless alarm symptoms  Electronically signed Ruffin Nageotte M D  Dec 29 2017  5:11PM EST      Electronically signed Ruffin Nageotte M D    Dec 29 2017  5:11PM EST

## 2018-01-24 VITALS
HEIGHT: 77 IN | OXYGEN SATURATION: 97 % | SYSTOLIC BLOOD PRESSURE: 120 MMHG | RESPIRATION RATE: 17 BRPM | WEIGHT: 222 LBS | BODY MASS INDEX: 26.21 KG/M2 | TEMPERATURE: 97.5 F | DIASTOLIC BLOOD PRESSURE: 88 MMHG | HEART RATE: 87 BPM

## 2018-02-23 VITALS
HEIGHT: 77 IN | DIASTOLIC BLOOD PRESSURE: 78 MMHG | OXYGEN SATURATION: 98 % | TEMPERATURE: 98.5 F | RESPIRATION RATE: 16 BRPM | HEIGHT: 77 IN | RESPIRATION RATE: 16 BRPM | WEIGHT: 215 LBS | SYSTOLIC BLOOD PRESSURE: 110 MMHG | BODY MASS INDEX: 25.39 KG/M2 | BODY MASS INDEX: 25.68 KG/M2 | HEART RATE: 92 BPM | WEIGHT: 217.5 LBS | SYSTOLIC BLOOD PRESSURE: 110 MMHG | DIASTOLIC BLOOD PRESSURE: 72 MMHG | TEMPERATURE: 97.5 F

## 2018-02-23 VITALS
WEIGHT: 215 LBS | HEIGHT: 65 IN | BODY MASS INDEX: 35.82 KG/M2 | SYSTOLIC BLOOD PRESSURE: 109 MMHG | DIASTOLIC BLOOD PRESSURE: 77 MMHG

## 2018-03-13 ENCOUNTER — OFFICE VISIT (OUTPATIENT)
Dept: PODIATRY | Facility: CLINIC | Age: 53
End: 2018-03-13
Payer: COMMERCIAL

## 2018-03-13 VITALS — BODY MASS INDEX: 25.74 KG/M2 | HEIGHT: 77 IN | WEIGHT: 218 LBS

## 2018-03-13 DIAGNOSIS — B35.1 TINEA UNGUIUM: Primary | ICD-10-CM

## 2018-03-13 DIAGNOSIS — M20.11 ACQUIRED HALLUX VALGUS OF BOTH FEET: ICD-10-CM

## 2018-03-13 DIAGNOSIS — B35.1 ONYCHOMYCOSIS: ICD-10-CM

## 2018-03-13 DIAGNOSIS — R20.2 PARESTHESIA: ICD-10-CM

## 2018-03-13 DIAGNOSIS — M77.8 CAPSULITIS OF RIGHT FOOT: ICD-10-CM

## 2018-03-13 DIAGNOSIS — M20.12 ACQUIRED HALLUX VALGUS OF BOTH FEET: ICD-10-CM

## 2018-03-13 PROCEDURE — 99213 OFFICE O/P EST LOW 20 MIN: CPT | Performed by: PODIATRIST

## 2018-03-13 PROCEDURE — 20550 NJX 1 TENDON SHEATH/LIGAMENT: CPT | Performed by: PODIATRIST

## 2018-03-13 RX ORDER — NICOTINE 21 MG/24HR
PATCH, TRANSDERMAL 24 HOURS TRANSDERMAL
Refills: 0 | COMMUNITY
Start: 2018-01-17 | End: 2018-12-21

## 2018-03-13 RX ORDER — TERBINAFINE HYDROCHLORIDE 250 MG/1
250 TABLET ORAL DAILY
Qty: 30 TABLET | Refills: 1 | Status: SHIPPED | OUTPATIENT
Start: 2018-03-13 | End: 2018-05-12

## 2018-03-13 RX ORDER — PANTOPRAZOLE SODIUM 40 MG/1
TABLET, DELAYED RELEASE ORAL
Refills: 0 | COMMUNITY
Start: 2018-01-18 | End: 2018-12-21

## 2018-03-13 RX ORDER — SUCRALFATE ORAL 1 G/10ML
10 SUSPENSION ORAL 4 TIMES DAILY
COMMUNITY
Start: 2017-12-18 | End: 2018-12-21

## 2018-03-13 RX ORDER — CLOTRIMAZOLE AND BETAMETHASONE DIPROPIONATE 10; .64 MG/G; MG/G
CREAM TOPICAL 2 TIMES DAILY
COMMUNITY
Start: 2017-12-22 | End: 2018-12-21

## 2018-03-13 RX ORDER — TERBINAFINE HYDROCHLORIDE 250 MG/1
TABLET ORAL
Refills: 0 | COMMUNITY
Start: 2018-01-22 | End: 2018-03-13 | Stop reason: SDUPTHER

## 2018-03-13 NOTE — PROGRESS NOTES
Assessment/Plan:    Injected right hallux medial IPJ  Injected 1 cc 1% lidocaine plain, 0 5 cc sec muscle phosphate, 0 2 cc Kenalog 10, dressing applied no complications  Discussed risks of infection discussed risks of tendon or ligament rupture  Discussed wider shoes discussed padding and bunion shield  Patient will take 2 more months of Lamisil  Rx CMP  Discussed risk of liver damage discussed side effects of medication  Follow-up 2 weeks  Diagnoses and all orders for this visit:    Tinea unguium  -     terbinafine (LamISIL) 250 mg tablet; Take 1 tablet (250 mg total) by mouth daily for 60 days    Capsulitis of right foot    Acquired hallux valgus of both feet    Onychomycosis    Paresthesia    Other orders  -     sucralfate (CARAFATE) 1 g/10 mL suspension; Take 10 mL by mouth 4 (four) times a day  -     clotrimazole-betamethasone (LOTRISONE) 1-0 05 % cream; Apply topically Twice daily  -     nicotine (NICODERM CQ) 21 mg/24 hr TD 24 hr patch;   -     pantoprazole (PROTONIX) 40 mg tablet; take 1 tablet by mouth once daily  30 MINUTES BEFORE BREAKFAST  -     terbinafine (LamISIL) 250 mg tablet;           Subjective:      Patient ID: Shara Jiménez is a 47 y o  male  Patient has completed a 3 month course of Lamisil  Patient in no side effects  The nails are significantly improved  In the past few weeks patient has had significant burning and shooting pain along the medial 1st MPJ  Says it happened since he has gone back to work and been on his feet in boots  Patient does have a very large bunion with does not have pain in the bunion itself does has burning and shooting pain on off that lasts for anywhere from 5 minutes to an hour often happens at night  Pain is mostly on the medial aspect of the proximal phalanx of the hallux right          The following portions of the patient's history were reviewed and updated as appropriate: allergies, current medications, past family history, past medical history, past social history, past surgical history and problem list     Review of Systems   Constitutional: Negative  HENT: Negative  Eyes: Negative  Respiratory: Negative  Cardiovascular: Negative  Gastrointestinal: Negative  Endocrine: Negative  Genitourinary: Negative  Musculoskeletal: Negative  Skin: Negative  Allergic/Immunologic: Negative  Neurological: Negative  Hematological: Negative  Psychiatric/Behavioral: Negative  Objective:      Ht 6' 5" (1 956 m)   Wt 98 9 kg (218 lb)   BMI 25 85 kg/m²       Active Problems   1  Abdominal pain (789 00) (R10 9)   2  Acquired hallux valgus of both feet (735 0) (M20 11,M20 12)   3  Change in bowel habits (787 99) (R19 4)   4  Colon cancer screening (V76 51) (Z12 11)   5  GERD (gastroesophageal reflux disease) (530 81) (K21 9)   6  Nausea and/or vomiting (787 01) (R11 2)   7  Nicotine dependence (305 1) (F17 200)   8  Onychomycosis (110 1) (B35 1)   9  Palpitations (785 1) (R00 2)   10  Rash (782 1) (R21)   11  Shortness of breath (786 05) (R06 02)   12  Tinea pedis (110 4) (B35 3)     Surgical History    · Denied: History Of Prior Surgery     Family History   Mother    · Denied: Family history of colon cancer   · Denied: Family history of colonic polyps   · Family history of liver cancer (V16 0) (Z80 0)   · Denied: Family history of liver disease  Father    · Denied: Family history of colon cancer   · Denied: Family history of colonic polyps   · Family history of liver cancer (V16 0) (Z80 0)   · Denied: Family history of liver disease     Social History    · Current every day smoker (305 1) (F17 200)   · Feels safe at home   · Social alcohol use (Z78 9)     Current Meds    1  Carafate 1 GM/10ML Oral Suspension; TAKE 10 ML 4 TIMES DAILY; Therapy: 90AEF8226 to (Evaluate:79Kno0641)  Requested for: 96FTQ6649; Last     Rx:71Fmh9502 Ordered   2   Clotrimazole-Betamethasone 1-0 05 % External Cream; APPLY  AND RUB  IN A THIN FILM TO AFFECTED AREAS TWICE DAILY  (AM AND PM); Therapy: 96UXZ7983 to (Last Rx:21Nov2017)  Requested for: 21Nov2017 Ordered   3  Pantoprazole Sodium 40 MG Oral Tablet Delayed Release; take 1 tablet every twelve     hours; Therapy: 35UBT8087 to (Evaluate:27Nre9883)  Requested for: 12RPR8082; Last     Rx:15Nov2017 Ordered   4  Suprep Bowel Prep Kit 17 5-3 13-1 6 GM/180ML Oral Solution; DILUTE CONTENTS AND     USE AS DIRECTED FOR BOWEL PREP; Therapy: 28NOG4070 to (Last Rx:15Nov2017)  Requested for: 88TZT9898 Ordered   5  Terbinafine HCl - 250 MG Oral Tablet; TAKE 1 TABLET DAILY; Therapy: 66EXR2695 to (Evaluate:36Avl1703)  Requested for: 21Nov2017; Last     Rx:21Nov2017 Ordered      Physical exam:  Left Foot: Appearance: Normal except as noted: excessive pronation-- and-- pes planus  Great toe deformities include a bunion  No blisters resolving tinea pedis  Negative erythema no signs of infection  Special Tests: moderate onychomycosis there is thickening and dystrophy toenails  Right Foot: Appearance: Normal except as noted: excessive pronation-- and-- pes planus  Great toe deformities include a bunion  Significant bunion bilateral decreased range of motion 1st MPJ no pain on range of motion  Neurological Exam: performed  Light touch was intact bilaterally  Vibratory sensation was intact bilaterally  Vascular Exam: performed Dorsalis pedis pulses were 2/4 bilaterally  Posterior tibial pulses were 1/4 bilaterally  Capillary refill time was between 1-3 seconds bilaterally  There is some clearing of the proximal aspect of the nail distal significant dystrophy the distal aspect of the nail  Tinea pedis has mostly resolved  There is no signs of infection  Patient has pain and burning the medial hallux just distal to the 1st MPJ    Along the medial nerve   Physical Exam   Musculoskeletal:        Feet:

## 2018-05-29 ENCOUNTER — HOSPITAL ENCOUNTER (OUTPATIENT)
Dept: RADIOLOGY | Facility: HOSPITAL | Age: 53
Discharge: HOME | End: 2018-05-29
Attending: FAMILY MEDICINE
Payer: COMMERCIAL

## 2018-05-29 ENCOUNTER — OFFICE VISIT (OUTPATIENT)
Dept: FAMILY MEDICINE CLINIC | Facility: CLINIC | Age: 53
End: 2018-05-29
Payer: COMMERCIAL

## 2018-05-29 ENCOUNTER — TRANSCRIBE ORDERS (OUTPATIENT)
Dept: ADMINISTRATIVE | Facility: HOSPITAL | Age: 53
End: 2018-05-29

## 2018-05-29 VITALS
OXYGEN SATURATION: 98 % | HEIGHT: 77 IN | TEMPERATURE: 97.2 F | WEIGHT: 222 LBS | BODY MASS INDEX: 26.21 KG/M2 | HEART RATE: 82 BPM

## 2018-05-29 DIAGNOSIS — M25.562 CHRONIC PAIN OF LEFT KNEE: ICD-10-CM

## 2018-05-29 DIAGNOSIS — G89.29 CHRONIC PAIN OF LEFT KNEE: Primary | ICD-10-CM

## 2018-05-29 DIAGNOSIS — M25.562 CHRONIC PAIN OF LEFT KNEE: Primary | ICD-10-CM

## 2018-05-29 DIAGNOSIS — G89.29 CHRONIC PAIN OF LEFT KNEE: ICD-10-CM

## 2018-05-29 DIAGNOSIS — L29.9 GENERALIZED PRURITUS: ICD-10-CM

## 2018-05-29 PROCEDURE — 99213 OFFICE O/P EST LOW 20 MIN: CPT | Performed by: FAMILY MEDICINE

## 2018-05-29 PROCEDURE — 73562 X-RAY EXAM OF KNEE 3: CPT

## 2018-05-29 NOTE — PROGRESS NOTES
Assessment/Plan:    Generalized pruritus  Requisition given to patient for CBC, CMP, lipase and HbA1c  Physical exam within normal limits  Will follow-up with patient once results obtained, to decide about further management    Chronic pain of left knee  Requisition given to patient for x-ray of left knee  Recommend ice and heat therapy daily, Tylenol as needed  Discussed with patient that depending on findings of x-ray, may need to refer to physical therapy or orthopedics  Patient states that he is interested in a cortisone injection but will decide definitively at his follow-up visit  D/W Dr Arloa Kocher    Subjective:      Patient ID: Cherelle Cleveland is a 47 y o  male  HPI  This is a 26-year-old male who presents with a 10 year history of generalized pruritus  Patient states itching initially began in the soles of his feet and palms of his hands  He has used multiple over-the-counter anti-itching creams with minimal relief  Four months ago, the patient was seen by podiatry and diagnosed with onychomycosis of his feet  Treatment was started with both topical and oral antifungal agents  [facility] states he has not had any relief and continues to have persistent itching of his feet, palms, lower extremities, upper extremities and upper back  Despite having pruritus, he has no obvious rash or lesions on the skin  No family history of similar symptoms nor does anyone at home complain of generalized itching  Patient denies any new medications except for his antifungal therapy or change in diet and he lives in the same home for the past 4 years  No history of fever, abdominal pain, lethargy, weight loss or change in appetite  Mr Ranjit Gan also complains of chronic left knee pain x 1 year  Pain is described as dull, graded 3/10 in intensity, not associated with any swelling, erythema or warmth of the knee   He denies any recent injury to left knee and admits that 5 years ago he did receive a cortisone injection to the left knee which had helped his symptoms of knee pain since then  He has not had any physical therapy in the past for this complaint  Review of Systems   Constitutional: Negative for chills and fever  HENT: Negative for congestion, ear pain, rhinorrhea and sore throat  Eyes: Negative for discharge  Respiratory: Negative for cough, chest tightness, shortness of breath, wheezing and stridor  Cardiovascular: Negative for chest pain, palpitations and leg swelling  Gastrointestinal: Negative for abdominal pain, constipation, diarrhea, nausea and vomiting  Genitourinary: Negative for dysuria, enuresis, flank pain, frequency, hematuria and urgency  Musculoskeletal: Positive for arthralgias (Chronic knee pain)  Negative for back pain, gait problem, joint swelling and myalgias  Skin: Negative for color change, pallor, rash and wound  Generalized pruritus   Neurological: Negative for dizziness, tremors, syncope, weakness, light-headedness, numbness and headaches  Hematological: Negative for adenopathy  Does not bruise/bleed easily  Objective:      Pulse 82   Temp (!) 97 2 °F (36 2 °C) (Tympanic)   Ht 6' 5" (1 956 m)   Wt 101 kg (222 lb)   SpO2 98%   BMI 26 33 kg/m²          Physical Exam   Constitutional: He is oriented to person, place, and time  He appears well-developed and well-nourished  No distress  HENT:   Head: Normocephalic and atraumatic  Right Ear: External ear normal    Left Ear: External ear normal    Nose: Nose normal    Mouth/Throat: Oropharynx is clear and moist  No oropharyngeal exudate  Eyes: Conjunctivae are normal  Right eye exhibits no discharge  Left eye exhibits no discharge  No scleral icterus  Neck: Normal range of motion  Neck supple  No JVD present  Cardiovascular: Normal rate, regular rhythm, normal heart sounds and intact distal pulses  Exam reveals no gallop and no friction rub  No murmur heard    Pulmonary/Chest: Effort normal and breath sounds normal  No stridor  No respiratory distress  He has no wheezes  He has no rales  He exhibits no tenderness  Abdominal: Soft  Bowel sounds are normal  He exhibits no distension  There is no tenderness  There is no rebound  Musculoskeletal: Normal range of motion  He exhibits no edema, tenderness or deformity  Left knee: No erythema, swelling or warmth  Normal range of motion  Lachman's negative, Karyn negative  Minimal tenderness on palpation over medial joint line   Neurological: He is alert and oriented to person, place, and time  He has normal reflexes  He displays normal reflexes  No cranial nerve deficit  He exhibits normal muscle tone  Coordination normal    Skin: Skin is warm  No rash noted  He is not diaphoretic  No erythema  No pallor  Psychiatric: He has a normal mood and affect

## 2018-05-29 NOTE — ASSESSMENT & PLAN NOTE
Requisition given to patient for x-ray of left knee  Recommend ice and heat therapy daily, Tylenol as needed  Discussed with patient that depending on findings of x-ray, may need to refer to physical therapy or orthopedics    Patient states that he is interested in a cortisone injection but will decide definitively at his follow-up visit

## 2018-05-29 NOTE — ASSESSMENT & PLAN NOTE
Requisition given to patient for CBC, CMP, lipase and HbA1c    Physical exam within normal limits  Will follow-up with patient once results obtained, to decide about further management

## 2018-05-30 LAB
ALBUMIN SERPL-MCNC: 4 G/DL (ref 3.5–5.5)
ALBUMIN/GLOB SERPL: 1.4 {RATIO} (ref 1.2–2.2)
ALP SERPL-CCNC: 57 IU/L (ref 39–117)
ALT SERPL-CCNC: 19 IU/L (ref 0–44)
AMBIG ABBREV DEFAULT: NORMAL
AST SERPL-CCNC: 21 IU/L (ref 0–40)
BASOPHILS # BLD AUTO: 0 X10E3/UL (ref 0–0.2)
BASOPHILS NFR BLD AUTO: 0 %
BILIRUB SERPL-MCNC: 0.4 MG/DL (ref 0–1.2)
BUN SERPL-MCNC: 12 MG/DL (ref 6–24)
BUN/CREAT SERPL: 11 (ref 9–20)
CALCIUM SERPL-MCNC: 9.3 MG/DL (ref 8.7–10.2)
CHLORIDE SERPL-SCNC: 102 MMOL/L (ref 96–106)
CO2 SERPL-SCNC: 25 MMOL/L (ref 18–29)
CREAT SERPL-MCNC: 1.12 MG/DL (ref 0.76–1.27)
EOSINOPHIL # BLD AUTO: 0.1 X10E3/UL (ref 0–0.4)
EOSINOPHIL NFR BLD AUTO: 2 %
ERYTHROCYTE [DISTWIDTH] IN BLOOD BY AUTOMATED COUNT: 14.9 % (ref 12.3–15.4)
EST. AVERAGE GLUCOSE BLD GHB EST-MCNC: 117 MG/DL
GLOBULIN SER-MCNC: 2.8 G/DL (ref 1.5–4.5)
GLUCOSE SERPL-MCNC: 91 MG/DL (ref 65–99)
HBA1C MFR BLD: 5.7 % (ref 4.8–5.6)
HCT VFR BLD AUTO: 40.2 % (ref 37.5–51)
HGB BLD-MCNC: 14 G/DL (ref 13–17.7)
IMM GRANULOCYTES # BLD: 0 X10E3/UL (ref 0–0.1)
IMM GRANULOCYTES NFR BLD: 0 %
LIPASE SERPL-CCNC: 27 U/L (ref 13–78)
LYMPHOCYTES # BLD AUTO: 1.5 X10E3/UL (ref 0.7–3.1)
LYMPHOCYTES NFR BLD AUTO: 35 %
MCH RBC QN AUTO: 28.6 PG (ref 26.6–33)
MCHC RBC AUTO-ENTMCNC: 34.8 G/DL (ref 31.5–35.7)
MCV RBC AUTO: 82 FL (ref 79–97)
MONOCYTES # BLD AUTO: 0.3 X10E3/UL (ref 0.1–0.9)
MONOCYTES NFR BLD AUTO: 6 %
NEUTROPHILS # BLD AUTO: 2.4 X10E3/UL (ref 1.4–7)
NEUTROPHILS NFR BLD AUTO: 57 %
PLATELET # BLD AUTO: 211 X10E3/UL (ref 150–379)
POTASSIUM SERPL-SCNC: 4.4 MMOL/L (ref 3.5–5.2)
PROT SERPL-MCNC: 6.8 G/DL (ref 6–8.5)
RBC # BLD AUTO: 4.9 X10E6/UL (ref 4.14–5.8)
SL AMB EGFR AFRICAN AMERICAN: 87 ML/MIN/1.73
SL AMB EGFR NON AFRICAN AMERICAN: 76 ML/MIN/1.73
SODIUM SERPL-SCNC: 138 MMOL/L (ref 134–144)
WBC # BLD AUTO: 4.2 X10E3/UL (ref 3.4–10.8)

## 2018-05-31 LAB — HBA1C MFR BLD HPLC: 5.7 %

## 2018-06-22 ENCOUNTER — OFFICE VISIT (OUTPATIENT)
Dept: FAMILY MEDICINE CLINIC | Facility: CLINIC | Age: 53
End: 2018-06-22
Payer: COMMERCIAL

## 2018-06-22 VITALS
HEART RATE: 93 BPM | SYSTOLIC BLOOD PRESSURE: 160 MMHG | BODY MASS INDEX: 26.45 KG/M2 | OXYGEN SATURATION: 98 % | DIASTOLIC BLOOD PRESSURE: 88 MMHG | WEIGHT: 224 LBS | HEIGHT: 77 IN

## 2018-06-22 DIAGNOSIS — R03.0 ELEVATED BLOOD PRESSURE READING: ICD-10-CM

## 2018-06-22 DIAGNOSIS — R73.03 PREDIABETES: ICD-10-CM

## 2018-06-22 DIAGNOSIS — L29.9 GENERALIZED PRURITUS: Primary | ICD-10-CM

## 2018-06-22 DIAGNOSIS — K52.9 CHRONIC DIARRHEA: ICD-10-CM

## 2018-06-22 PROCEDURE — 99214 OFFICE O/P EST MOD 30 MIN: CPT | Performed by: FAMILY MEDICINE

## 2018-06-22 PROCEDURE — 3008F BODY MASS INDEX DOCD: CPT | Performed by: FAMILY MEDICINE

## 2018-06-22 NOTE — PROGRESS NOTES
Assessment/Plan:    Prediabetes  Patient's hemoglobin A1c was 5 7  Already received Pneumovax in 1/18  Patient counseled in detail about adhering to a diabetic, heart healthy diet and incorporating exercise into part of his daily routine  Refer to [facility] nutritionist for diabetic diet counseling  Patient follow-up with ophthalmology for annual eye exam    Generalized pruritus  CBC, CMP, lipase within normal limits  Advised to shower once daily and to avoid over drying of skin  Also counseled on use of gentle soaps such as Neutrogena, cetaphil  Start Kamillosan cream bid (per Dr Lauren Warner)  Start Evening Primrose oil, 3g po daily (per Dr Lauren Warner)  Will follow-up in 3-4 weeks      Chronic diarrhea  Recent colonoscopy 12/17 which showed diverticulosis and 2 polyps which were removed  Patient advised to follow-up with GI as he had initially seen GI for a change in his bowel movements    Elevated blood pressure reading  Blood pressure 160/88  Patient states his blood pressure typically runs within normal range  Will follow blood pressure at next visit, if BP elevated x 3 readings will start antihypertensive therapy  Patient counseled on adhering to a low-salt, diabetic heart healthy diet and incorporating exercise into part of his daily routine  D/W Dr Lauren Warner    Subjective:      Patient ID: Aaron Gilbert is a 47 y o  male  HPI  This is a 73-year-old male with a history of pruritus for over 10 years of his feet, hands and occasionally lower extremities, who presents for follow-up of his blood work  CBC, CMP and lipase were within normal limits while  HbA1c was noted to be 5 7  He also admits that he has a significant family history of diabetes  Today, Yelena West continues to complain of itching of his soles, hands, palms and lower leg  He admits that he has been seen by podiatry multiple times in the past where he was treated with oral/topical antifungal and steroid therapy with minimal relief   He has also used Benadryl, Claritin or antihistamines without effect  He has been itching profusely the soles of his feet and has sustained superficial excoriation marks as result of this  He states he typically uses [facility] soap to shower with and denies use of any fragranted lotions or body washes  No known contacts with similar symptoms  Matthew Hazel also states that he had a colonoscopy in 12/17 where he had removal of 2 polyps  He states over the past one year he has had frequent episodes of diarrhea for which he had initiated a consult with GI  He continues to have diarrheal episodes once weekly which consist of loose, watery, brown stool at least 5 times on that particular day  He denies any blood in his stool  Review of Systems   Constitutional: Negative for activity change, appetite change, chills, fatigue and fever  HENT: Negative for congestion, ear pain, rhinorrhea and sore throat  Eyes: Negative for discharge and itching  Respiratory: Negative for cough, chest tightness, shortness of breath, wheezing and stridor  Cardiovascular: Negative for chest pain, palpitations and leg swelling  Gastrointestinal: Positive for diarrhea (chronic)  Negative for abdominal pain, constipation, nausea and vomiting  Endocrine: Negative for cold intolerance, heat intolerance, polydipsia, polyphagia and polyuria  Genitourinary: Negative for decreased urine volume, dysuria, frequency, hematuria and urgency  Musculoskeletal: Positive for arthralgias (knee pain)  Negative for back pain and myalgias  Skin: Negative for color change, pallor, rash and wound  Pruritus of hands, feet and lower leg   Allergic/Immunologic: Negative for environmental allergies and food allergies  Neurological: Negative for dizziness, weakness, light-headedness, numbness and headaches  Psychiatric/Behavioral: Negative for confusion           Objective:      /88   Pulse 93   Ht 6' 5" (1 956 m)   Wt 102 kg (224 lb)   SpO2 98%   BMI 26 56 kg/m² Physical Exam   Constitutional: He is oriented to person, place, and time  He appears well-developed and well-nourished  No distress  HENT:   Head: Normocephalic and atraumatic  Nose: Nose normal    Mouth/Throat: Oropharynx is clear and moist  No oropharyngeal exudate  Eyes: Conjunctivae are normal  Right eye exhibits no discharge  Left eye exhibits no discharge  No scleral icterus  Neck: Normal range of motion  Neck supple  No JVD present  Cardiovascular: Normal rate, regular rhythm, normal heart sounds and intact distal pulses  Exam reveals no gallop and no friction rub  No murmur heard  Pulmonary/Chest: Effort normal and breath sounds normal  No stridor  No respiratory distress  He has no wheezes  He has no rales  Abdominal: Soft  Bowel sounds are normal  He exhibits no distension  There is no tenderness  Musculoskeletal: Normal range of motion  He exhibits no edema, tenderness or deformity  Neurological: He is alert and oriented to person, place, and time  Skin: Skin is warm  No rash noted  He is not diaphoretic  No erythema  No pallor  Superficial excoriation marks on his feet secondary to itching  No obvious rash, erythema or skin lesions noted of the hands, palms, lower legs or feet   Psychiatric: He has a normal mood and affect

## 2018-06-23 PROBLEM — K52.9 CHRONIC DIARRHEA: Status: ACTIVE | Noted: 2018-06-23

## 2018-06-23 PROBLEM — R03.0 ELEVATED BLOOD PRESSURE READING: Status: ACTIVE | Noted: 2018-06-23

## 2018-06-23 PROBLEM — R73.03 PREDIABETES: Status: ACTIVE | Noted: 2018-06-23

## 2018-06-23 NOTE — ASSESSMENT & PLAN NOTE
Patient's hemoglobin A1c was 5 7  Already received Pneumovax in 1/18  Patient counseled in detail about adhering to a diabetic, heart healthy diet and incorporating exercise into part of his daily routine  Refer to [facility] nutritionist for diabetic diet counseling  Patient follow-up with ophthalmology for annual eye exam

## 2018-06-23 NOTE — ASSESSMENT & PLAN NOTE
Blood pressure 160/88  Patient states his blood pressure typically runs within normal range  Will follow blood pressure at next visit, if BP elevated x 3 readings will start antihypertensive therapy  Patient counseled on adhering to a low-salt, diabetic heart healthy diet and incorporating exercise into part of his daily routine

## 2018-06-23 NOTE — ASSESSMENT & PLAN NOTE
Recent colonoscopy 12/17 which showed diverticulosis and 2 polyps which were removed  Patient advised to follow-up with GI as he had initially seen GI for a change in his bowel movements

## 2018-06-23 NOTE — ASSESSMENT & PLAN NOTE
CBC, CMP, lipase within normal limits  Advised to shower once daily and to avoid over drying of skin  Also counseled on use of gentle soaps such as Neutrogena, cetaphil  Start Kamillosan cream bid (per Dr Yaneli Pulido)  Start Evening Primrose oil, 3g po daily (per Dr Yaneli Pulido)  Will follow-up in 3-4 weeks

## 2018-12-21 ENCOUNTER — OFFICE VISIT (OUTPATIENT)
Dept: FAMILY MEDICINE CLINIC | Facility: CLINIC | Age: 53
End: 2018-12-21
Payer: COMMERCIAL

## 2018-12-21 VITALS
RESPIRATION RATE: 18 BRPM | BODY MASS INDEX: 25.02 KG/M2 | HEART RATE: 100 BPM | SYSTOLIC BLOOD PRESSURE: 142 MMHG | TEMPERATURE: 97.2 F | OXYGEN SATURATION: 98 % | DIASTOLIC BLOOD PRESSURE: 88 MMHG | WEIGHT: 211 LBS

## 2018-12-21 DIAGNOSIS — J06.9 VIRAL URI WITH COUGH: Primary | ICD-10-CM

## 2018-12-21 PROCEDURE — 99213 OFFICE O/P EST LOW 20 MIN: CPT | Performed by: FAMILY MEDICINE

## 2018-12-21 NOTE — LETTER
December 21, 2018     Patient: Jessica Hernandez   YOB: 1965   Date of Visit: 12/21/2018       To Whom it May Concern:    Jessica Hernandez was seen in my clinic on 12/21/2018  He may return to work on 12/26/18  If you have any questions or concerns, please don't hesitate to call           Sincerely,          Violette Post DO        CC: No Recipients

## 2018-12-21 NOTE — PROGRESS NOTES
Assessment/Plan:     Diagnoses and all orders for this visit:    Viral URI with cough  Continue symptomatic relief  No immunocompromised contacts at home  Encouraged to continue drinking warm fluids, such as tea with honey  Advised patient to return to office if symptoms worsen  RTO if symptoms do not improve for several weeks or symptoms worsen          Subjective:      Patient ID: Luis Fernando Castro is a 47 y o  male  46 y/o male presents with cough, sore throat and runny nose for 3 days  He has also been experiencing fever and chills, no fever actually measured  Since onset he has had mild improvement, but no resolution  One episode of large loose bowel movement the day after symptoms started, with overall decreased appetite  Admits to mild nausea, but no vomiting  Additionally has been experiencing fatigue with body aches  He has tried Alleve the first day but did not see any change or improvement  Has been drinking tea with honey and getting rest from work  Sick contact is a coworker who came in to work with a fever the day prior to start of symptoms  The following portions of the patient's history were reviewed and updated as appropriate: allergies, current medications, past family history, past medical history, past social history, past surgical history and problem list     Review of Systems   Constitutional: Positive for appetite change, chills, fatigue and fever  HENT: Positive for congestion, rhinorrhea, sneezing and sore throat  Respiratory: Positive for cough  Negative for apnea, chest tightness, shortness of breath and wheezing  Cardiovascular: Negative for chest pain and palpitations  Gastrointestinal: Positive for diarrhea and nausea  Negative for abdominal pain, constipation and vomiting  Genitourinary: Negative  Musculoskeletal: Positive for myalgias  Skin: Negative for pallor and rash  Neurological: Negative            Objective:      /88   Pulse 100   Temp (!) 97 2 °F (36 2 °C)   Resp 18   Wt 95 7 kg (211 lb)   SpO2 98%   BMI 25 02 kg/m²          Physical Exam   Constitutional: He is oriented to person, place, and time  He appears well-developed and well-nourished  No distress  HENT:   Head: Normocephalic and atraumatic  Right Ear: External ear normal    Left Ear: External ear normal    Nose: Nose normal    Mouth/Throat: Oropharynx is clear and moist  No oropharyngeal exudate  Eyes: Conjunctivae are normal  Right eye exhibits no discharge  Left eye exhibits no discharge  Neck: Normal range of motion  Cardiovascular: Normal rate, regular rhythm, normal heart sounds and intact distal pulses  No murmur heard  Pulmonary/Chest: Effort normal  No respiratory distress  He has no wheezes  Abdominal: Soft  Bowel sounds are normal  He exhibits no distension  There is no tenderness  There is no rebound  Musculoskeletal: Normal range of motion  Neurological: He is alert and oriented to person, place, and time  No cranial nerve deficit  Skin: Skin is warm and dry  He is not diaphoretic  No erythema  Psychiatric: He has a normal mood and affect  His behavior is normal  Judgment and thought content normal    Nursing note and vitals reviewed

## 2019-06-07 ENCOUNTER — APPOINTMENT (OUTPATIENT)
Dept: URGENT CARE | Facility: CLINIC | Age: 54
End: 2019-06-07
Payer: OTHER MISCELLANEOUS

## 2019-06-07 PROCEDURE — 99283 EMERGENCY DEPT VISIT LOW MDM: CPT

## 2019-06-07 PROCEDURE — G0382 LEV 3 HOSP TYPE B ED VISIT: HCPCS

## 2019-06-10 ENCOUNTER — APPOINTMENT (OUTPATIENT)
Dept: URGENT CARE | Facility: CLINIC | Age: 54
End: 2019-06-10
Payer: OTHER MISCELLANEOUS

## 2019-06-10 PROCEDURE — 99213 OFFICE O/P EST LOW 20 MIN: CPT | Performed by: NURSE PRACTITIONER

## 2019-08-01 ENCOUNTER — OFFICE VISIT (OUTPATIENT)
Dept: URGENT CARE | Facility: CLINIC | Age: 54
End: 2019-08-01
Payer: COMMERCIAL

## 2019-08-01 VITALS
WEIGHT: 217.4 LBS | TEMPERATURE: 97.7 F | HEART RATE: 70 BPM | RESPIRATION RATE: 14 BRPM | HEIGHT: 77 IN | SYSTOLIC BLOOD PRESSURE: 146 MMHG | DIASTOLIC BLOOD PRESSURE: 93 MMHG | BODY MASS INDEX: 25.67 KG/M2 | OXYGEN SATURATION: 98 %

## 2019-08-01 DIAGNOSIS — L02.91 ABSCESS: ICD-10-CM

## 2019-08-01 DIAGNOSIS — T14.8XXA PUNCTURE WOUND: Primary | ICD-10-CM

## 2019-08-01 PROCEDURE — 99213 OFFICE O/P EST LOW 20 MIN: CPT | Performed by: NURSE PRACTITIONER

## 2019-08-01 PROCEDURE — 90715 TDAP VACCINE 7 YRS/> IM: CPT

## 2019-08-01 RX ORDER — CEPHALEXIN 500 MG/1
500 CAPSULE ORAL EVERY 12 HOURS SCHEDULED
Qty: 20 CAPSULE | Refills: 0 | Status: SHIPPED | OUTPATIENT
Start: 2019-08-01 | End: 2019-08-11

## 2019-08-01 NOTE — PATIENT INSTRUCTIONS
1  Keflex twice a day for 10 days   2  Ice to the area   3  Tetanus updated today   4  If worsening redness, drainage, or joint swelling follow up with your PCP    Puncture Wound   WHAT YOU NEED TO KNOW:   A puncture wound is a hole in the skin made by a sharp, pointed object  DISCHARGE INSTRUCTIONS:   Return to the emergency department if:   · You have severe pain  · You have numbness or tingling in the area of your wound  · Your wound starts bleeding and does not stop, even after you apply pressure  Contact your healthcare provider if:   · You have new drainage or a bad odor coming from the wound  · You have a fever  · You have increased swelling, redness, or pain  · You have red streaks on your skin coming from your wound  · You have questions or concerns about your condition or care  Medicines: You may need any of the following:  · NSAIDs , such as ibuprofen, help decrease swelling, pain, and fever  This medicine is available with or without a doctor's order  NSAIDs can cause stomach bleeding or kidney problems in certain people  If you take blood thinner medicine, always ask your healthcare provider if NSAIDs are safe for you  Always read the medicine label and follow directions  · Antibiotics  help treat a bacterial infection  · Take your medicine as directed  Contact your healthcare provider if you think your medicine is not helping or if you have side effects  Tell him of her if you are allergic to any medicine  Keep a list of the medicines, vitamins, and herbs you take  Include the amounts, and when and why you take them  Bring the list or the pill bottles to follow-up visits  Carry your medicine list with you in case of an emergency  Wound care:  Keep your wound clean and dry  When you are allowed to bathe, carefully wash the wound with soap and water  Dry the area and put on new, clean bandages as directed  Change your bandages when they get wet or dirty    Manage your symptoms:   · Rest  your injured area as much as possible  If the puncture wound is in your leg or foot, use crutches as directed  This will help keep the weight off your injured leg or foot as it heals  · Elevate  your injured area above the level of your heart as often as you can  This will help decrease swelling and pain  Prop your injured area on pillows or blankets to keep it elevated comfortably  Follow up with your healthcare provider in 2 to 3 days:  Write down your questions so you remember to ask them during your visits  © 2017 [facility] [facility] Information is for End User's use only and may not be sold, redistributed or otherwise used for commercial purposes  All illustrations and images included in CareNotes® are the copyrighted property of A D A M , Inc  or [employer]  The above information is an  only  It is not intended as medical advice for individual conditions or treatments  Talk to your doctor, nurse or pharmacist before following any medical regimen to see if it is safe and effective for you  Abscess   WHAT YOU NEED TO KNOW:   A warm compress may help your abscess drain  Your healthcare provider may make a cut in the abscess so it can drain  You may need surgery to remove an abscess that is on your hands or buttocks  DISCHARGE INSTRUCTIONS:   Return to the emergency department if:   · The area around your abscess becomes very painful, warm, or has red streaks  · You have a fever and chills  · Your heart is beating faster than usual      · You feel faint or confused  Contact your healthcare provider if:   · Your abscess gets bigger or does not get better  · Your abscess returns  · You have questions or concerns about your condition or care  Medicines: You may  need any of the following:  · Antibiotics  help treat a bacterial infection  · Acetaminophen  decreases pain and fever  It is available without a doctor's order   Ask how much to take and how often to take it  Follow directions  Acetaminophen can cause liver damage if not taken correctly  · NSAIDs , such as ibuprofen, help decrease swelling, pain, and fever  This medicine is available with or without a doctor's order  NSAIDs can cause stomach bleeding or kidney problems in certain people  If you take blood thinner medicine, always ask your healthcare provider if NSAIDs are safe for you  Always read the medicine label and follow directions  · Take your medicine as directed  Contact your healthcare provider if you think your medicine is not helping or if you have side effects  Tell him or her if you are allergic to any medicine  Keep a list of the medicines, vitamins, and herbs you take  Include the amounts, and when and why you take them  Bring the list or the pill bottles to follow-up visits  Carry your medicine list with you in case of an emergency  Self-care:   · Apply a warm compress to your abscess  This will help it open and drain  Wet a washcloth in warm, but not hot, water  Apply the compress for 10 minutes  Repeat this 4 times each day  Do not  press on an abscess or try to open it with a needle  You may push the bacteria deeper or into your blood  · Do not share your clothes, towels, or sheets with anyone  This can spread the infection to others  · Wash your hands often  This can help prevent the spread of germs  Use soap and water or an alcohol-based hand rub  Care for your wound after it is drained:   · Care for your wound as directed  If your healthcare provider says it is okay, carefully remove the bandage and gauze packing  You may need to soak the gauze to get it out of your wound  Clean your wound and the area around it as directed  Dry the area and put on new, clean bandages  Change your bandages when they get wet or dirty  · Ask your healthcare provider how to change the gauze in your wound    Keep track of how many pieces of gauze are placed inside the wound  Do not put too much packing in the wound  Do not pack the gauze too tightly in your wound  Follow up with your healthcare provider in 1 to 3 days: You may need to have your packing removed or your bandage changed  Write down your questions so you remember to ask them during your visits  © 2017 [address] [employer]  Information is for End User's use only and may not be sold, redistributed or otherwise used for commercial purposes  All illustrations and images included in CareNotes® are the copyrighted property of [employer]  or [employer]  The above information is an  only  It is not intended as medical advice for individual conditions or treatments  Talk to your doctor, nurse or pharmacist before following any medical regimen to see if it is safe and effective for you

## 2019-08-02 NOTE — PROGRESS NOTES
[facility] [employer] [facility]        NAME: Zack Goss is a 47 y o  male  : 1965    MRN: [id number]  DATE: 2019  TIME: 8:44 AM    Assessment and Plan   Puncture wound [T14  8XXA]  1  Puncture wound  cephalexin (KEFLEX) 500 mg capsule    TDAP Vaccine greater than or equal to 8yo   2  Abscess       Treated puncture wound and possible abscess with Keflex  Tetanus updated by RN  Patient states that if deemed work related at today's visit his company will reimburse him  At this time it is not known if the palpable mass/lump is related to the splinter  If the lump does not resolve with the antibiotic course patient should follow up with his primary care provider for further management  Patient Instructions   1  Keflex twice a day for 10 days   2  Ice to the area   3  Tetanus updated today   4  If worsening redness, drainage, or joint swelling follow up with your PCP    Follow up with PCP in 3-5 days  Proceed to  ER if symptoms worsen  Chief Complaint     Chief Complaint   Patient presents with    Elbow Problem     pt reports that he sustained a splinter in his arm last week and removed it  Pt presents today with  swollen area at his left inner elbow that he states feels like a knot- and is painful to touch          History of Present Illness       Patient is a 60-year-old male presenting for evaluation of a lump near his left elbow  He states 1 week ago he got a splinter while at work in the medial aspect of the left [facility] [place]  He pulled it out immediately  He believes he remove the entire splinter  He reported the incident to management at work  They applied antibiotic ointment and a Band-Aid  He noticed redness and swelling to the area yesterday  He is right-hand dominant  Last tetanus is unknown  Review of Systems   Review of Systems   Constitutional: Negative for activity change, chills and fever  Respiratory: Negative for cough and shortness of breath      Musculoskeletal: Negative for joint swelling  Skin: Positive for wound  Negative for color change  Neurological: Negative for dizziness, weakness and numbness  Current Medications       Current Outpatient Medications:     cephalexin (KEFLEX) 500 mg capsule, Take 1 capsule (500 mg total) by mouth every 12 (twelve) hours for 10 days, Disp: 20 capsule, Rfl: 0    Current Allergies     Allergies as of 08/01/2019    (No Known Allergies)            The following portions of the patient's history were reviewed and updated as appropriate: allergies, current medications, past family history, past medical history, past social history, past surgical history and problem list      Past Medical History:   Diagnosis Date    GERD (gastroesophageal reflux disease)        Past Surgical History:   Procedure Laterality Date    COLONOSCOPY N/A 12/22/2017    Procedure: COLONOSCOPY;  Surgeon: Niko Lizama MD;  Location: [place] [address] GI LAB; Service: Gastroenterology    ESOPHAGOGASTRODUODENOSCOPY N/A 12/22/2017    Procedure: ESOPHAGOGASTRODUODENOSCOPY (EGD); Surgeon: Niko Lizama MD;  Location: [facility] GI LAB; Service: Gastroenterology    NO PAST SURGERIES         Family History   Problem Relation Age of Onset    Cancer Mother         stomach    Hypertension Mother     Liver cancer Mother     Cancer Father         colon    Liver cancer Father     Hypertension Sister     No Known Problems Brother     No Known Problems Daughter     Leukemia Son     No Known Problems Brother     No Known Problems Daughter     No Known Problems Son          Medications have been verified  Objective   /93   Pulse 70   Temp 97 7 °F (36 5 °C)   Resp 14   Ht 6' 5" (1 956 m)   Wt 98 6 kg (217 lb 6 4 oz)   SpO2 98%   BMI 25 78 kg/m²        Physical Exam     Physical Exam   Constitutional: He is oriented to person, place, and time  Vital signs are normal  He appears well-developed and well-nourished  He is active  No distress     HENT:   Head: Normocephalic  Right Ear: Hearing normal    Left Ear: Hearing normal    Mouth/Throat: Oropharynx is clear and moist    Cardiovascular: Normal rate  Pulmonary/Chest: Effort normal  No respiratory distress  Neurological: He is alert and oriented to person, place, and time  He is not disoriented     Skin:

## 2021-09-17 ENCOUNTER — HOSPITAL ENCOUNTER (EMERGENCY)
Facility: HOSPITAL | Age: 56
Discharge: HOME | End: 2021-09-17
Attending: EMERGENCY MEDICINE | Admitting: EMERGENCY MEDICINE

## 2021-09-17 ENCOUNTER — APPOINTMENT (EMERGENCY)
Dept: RADIOLOGY | Facility: HOSPITAL | Age: 56
End: 2021-09-17

## 2021-09-17 VITALS
DIASTOLIC BLOOD PRESSURE: 95 MMHG | WEIGHT: 225 LBS | HEART RATE: 70 BPM | TEMPERATURE: 97.2 F | RESPIRATION RATE: 20 BRPM | BODY MASS INDEX: 26.57 KG/M2 | OXYGEN SATURATION: 98 % | SYSTOLIC BLOOD PRESSURE: 140 MMHG | HEIGHT: 77 IN

## 2021-09-17 DIAGNOSIS — M25.512 LEFT SHOULDER PAIN: ICD-10-CM

## 2021-09-17 DIAGNOSIS — M70.20 OLECRANON BURSITIS: Primary | ICD-10-CM

## 2021-09-17 DIAGNOSIS — M62.838 CERVICAL PARASPINAL MUSCLE SPASM: ICD-10-CM

## 2021-09-17 PROCEDURE — 73080 X-RAY EXAM OF ELBOW: CPT

## 2021-09-17 PROCEDURE — 99283 EMERGENCY DEPT VISIT LOW MDM: CPT

## 2021-09-17 PROCEDURE — 99284 EMERGENCY DEPT VISIT MOD MDM: CPT | Performed by: PHYSICIAN ASSISTANT

## 2021-09-17 PROCEDURE — 73030 X-RAY EXAM OF SHOULDER: CPT

## 2021-09-17 RX ORDER — CYCLOBENZAPRINE HCL 10 MG
10 TABLET ORAL 3 TIMES DAILY PRN
Qty: 9 TABLET | Refills: 0 | Status: SHIPPED | OUTPATIENT
Start: 2021-09-17 | End: 2021-09-20

## 2021-09-17 RX ORDER — NAPROXEN 500 MG/1
500 TABLET ORAL 2 TIMES DAILY WITH MEALS
Qty: 14 TABLET | Refills: 0 | Status: SHIPPED | OUTPATIENT
Start: 2021-09-17 | End: 2021-09-24

## 2021-09-17 NOTE — DISCHARGE INSTRUCTIONS
FLEXERIL MAY MAKE YOU SLEEPY AND RISK FOR FALLS OR INJURIES  PLEASE DO NOT DRIVE OR OPERATE HEAVY MACHINERY WHILE TAKING THIS MEDICATION  IF IT MAKES YOU TOO DROWSY PLEASE CUT MEDICATION IN HALF OR ONLY TAKE PRIOR TO BEDTIME

## 2021-09-17 NOTE — ED PROVIDER NOTES
History  Chief Complaint   Patient presents with    Arm Swelling     yesterday working in yard work, had left arm pain from shoulder to hand, woke up this morning with elbow swollen, hard to lift arm     43-year-old male presenting today for evaluation of left-sided shoulder pain that began last night while he was sleeping, significantly worsens with any type of movement better with rest   Relays that he was doing yd work yesterday and has recently been physically inactive  States he was raking leaves  Also noted to have swelling to the left elbow that is completely nontender and without pain  States that it feels warm  Has not had any fevers  No falls or injuries  Denies chest pain, shortness breath, diaphoresis, back pain, nausea vomiting, abdominal pain, numbness, paresthesias, headaches, changes in vision, slurring of speech etc           None       Past Medical History:   Diagnosis Date    GERD (gastroesophageal reflux disease)        Past Surgical History:   Procedure Laterality Date    COLONOSCOPY N/A 12/22/2017    Procedure: COLONOSCOPY;  Surgeon: Ashley Perera MD;  Location: [facility]; Service: Gastroenterology    ESOPHAGOGASTRODUODENOSCOPY N/A 12/22/2017    Procedure: ESOPHAGOGASTRODUODENOSCOPY (EGD); Surgeon: Ashley Perera MD;  Location: [facility] GI LAB; Service: Gastroenterology    NO PAST SURGERIES         Family History   Problem Relation Age of Onset    Cancer Mother         stomach    Hypertension Mother     Liver cancer Mother     Cancer Father         colon    Liver cancer Father     Hypertension Sister     No Known Problems Brother     No Known Problems Daughter     Leukemia Son     No Known Problems Brother     No Known Problems Daughter     No Known Problems Son      I have reviewed and agree with the history as documented      E-Cigarette/Vaping    E-Cigarette Use Never User      E-Cigarette/Vaping Substances     Social History     Tobacco Use    Smoking status: Current Every Day Smoker     Packs/day: 0 50     Years: 20 00     Pack years: 10 00    Smokeless tobacco: Never Used   Vaping Use    Vaping Use: Never used   Substance Use Topics    Alcohol use: Not Currently     Comment: socially    Drug use: No       Review of Systems   Constitutional: Negative  Negative for chills, fatigue and fever  HENT: Negative  Negative for congestion, postnasal drip, rhinorrhea and sore throat  Eyes: Negative  Respiratory: Negative  Negative for cough, shortness of breath and wheezing  Cardiovascular: Negative  Gastrointestinal: Negative  Negative for abdominal pain, diarrhea, nausea and vomiting  Endocrine: Negative  Genitourinary: Negative  Musculoskeletal: Positive for arthralgias and joint swelling  Negative for back pain, gait problem, myalgias, neck pain and neck stiffness  Skin: Negative  Neurological: Negative  Hematological: Negative  Psychiatric/Behavioral: Negative  All other systems reviewed and are negative  Physical Exam  Physical Exam  Vitals and nursing note reviewed  Constitutional:       General: He is not in acute distress  Appearance: He is well-developed  He is not diaphoretic  HENT:      Head: Normocephalic and atraumatic  Right Ear: External ear normal       Left Ear: External ear normal       Nose: Nose normal       Mouth/Throat:      Pharynx: No oropharyngeal exudate  Eyes:      General: No scleral icterus  Right eye: No discharge  Left eye: No discharge  Conjunctiva/sclera: Conjunctivae normal       Pupils: Pupils are equal, round, and reactive to light  Cardiovascular:      Rate and Rhythm: Normal rate and regular rhythm  Pulses: Normal pulses  Heart sounds: Normal heart sounds  No murmur heard  No friction rub  No gallop  Pulmonary:      Effort: Pulmonary effort is normal  No respiratory distress  Breath sounds: Normal breath sounds  No stridor   No wheezing, rhonchi or rales  Comments: S PO2 is 98% indicating adequate oxygenation  Chest:      Chest wall: No tenderness  Abdominal:      General: Abdomen is flat  Bowel sounds are normal  There is no distension  Palpations: Abdomen is soft  There is no mass  Tenderness: There is no abdominal tenderness  There is no guarding or rebound  Hernia: No hernia is present  Musculoskeletal:        Arms:       Cervical back: Normal range of motion and neck supple  Lymphadenopathy:      Cervical: No cervical adenopathy  Skin:     General: Skin is warm and dry  Capillary Refill: Capillary refill takes less than 2 seconds  Coloration: Skin is not pale  Findings: No erythema or rash  Neurological:      General: No focal deficit present  Mental Status: He is alert and oriented to person, place, and time  Mental status is at baseline  Vital Signs  ED Triage Vitals   Temperature Pulse Respirations Blood Pressure SpO2   09/17/21 1120 09/17/21 0948 09/17/21 0948 09/17/21 0948 09/17/21 0948   (!) 97 2 °F (36 2 °C) 70 20 140/95 98 %      Temp Source Heart Rate Source Patient Position - Orthostatic VS BP Location FiO2 (%)   09/17/21 1120 09/17/21 0948 09/17/21 0948 09/17/21 0948 --   Temporal Monitor Sitting Left arm       Pain Score       --                  Vitals:    09/17/21 0948   BP: 140/95   Pulse: 70   Patient Position - Orthostatic VS: Sitting         Visual Acuity      ED Medications  Medications - No data to display    Diagnostic Studies  Results Reviewed     None                 XR shoulder 2+ views LEFT   ED Interpretation by David Kumar PA-C (09/17 1144)   No acute osseous abnormality       Final Result by Sabine Kimble MD (09/17 1154)      No acute osseous abnormality        Workstation performed: [id number]         XR elbow 3+ views LEFT   ED Interpretation by David Kumar PA-C (09/17 1144)   No acute osseous abnormality       Final Result by CHPAIN  Devonte Calderón MD (09/17 4895)      No acute osseous abnormality  Soft tissue swelling dorsal to the olecranon may be due to clinically suspected bursitis  Workstation performed: [id number]                    Procedures  Procedures         ED Course                                           MDM  Number of Diagnoses or Management Options  Cervical paraspinal muscle spasm  Left shoulder pain  Olecranon bursitis  Diagnosis management comments: Discussed imaging studies with the patient, pain is largely reproducible on light palpation no signs of cellulitis or septic joint  Suspect bursitis, an Ace wrap was placed the left elbow and assessed by me with good neurovascular exam before and after  Cervical paraspinous muscular spasm noted  Will treat with muscle relaxants, informed not to drive or operate heavy machinery while taking this medication, given thorough education regarding side effect profile of medication  He is wanting to follow up with Orthopedics and follow-up information was given  Patient is informed to return to the emergency department for worsening of symptoms and was given proper education regarding their diagnosis and symptoms  Otherwise the patient is informed to follow up with their primary care doctor/ Orthopedics for re-evaluation  The patient verbalizes understanding and agrees with above assessment and plan  All questions were answered  Please Note: Fluency Direct voice recognition software may have been used in the creation of this document  Wrong words or sound a like substitutions may have occurred due to the inherent limitations of the voice software             Amount and/or Complexity of Data Reviewed  Tests in the radiology section of CPT®: ordered and reviewed  Review and summarize past medical records: yes  Independent visualization of images, tracings, or specimens: yes        Disposition  Final diagnoses:   Olecranon bursitis   Left shoulder pain Cervical paraspinal muscle spasm     Time reflects when diagnosis was documented in both MDM as applicable and the Disposition within this note     Time User Action Codes Description Comment    9/17/2021 12:19 PM Adriel Boss Add [M70 20] Olecranon bursitis     9/17/2021 12:19 PM Adriel Boss Add [X25 764] Left shoulder pain     9/17/2021 12:19 PM Adriel Boss Add [S41 741] Cervical paraspinal muscle spasm       ED Disposition     ED Disposition Condition Date/Time Comment    Discharge Stable Fri Sep 17, 2021 12:18 PM Zack Goss discharge to home/self care  Follow-up Information     Follow up With Specialties Details Why Contact Info Additional [address] Emergency Department Emergency Medicine Go to  If symptoms worsen such as chest pain, shortness of breath, sweating episodes [address] [address]  [address] [place] [address] Emergency Department, [facility], [address]    Brnedan Rasmussen MD Orthopedic Surgery Schedule an appointment as soon as possible for a visit in 1 day  [address] [place] [address]  [phone]             Discharge Medication List as of 9/17/2021 12:34 PM      START taking these medications    Details   cyclobenzaprine (FLEXERIL) 10 mg tablet Take 1 tablet (10 mg total) by mouth 3 (three) times a day as needed for muscle spasms for up to 3 days, Starting Fri 9/17/2021, Until Mon 9/20/2021 at 2359, Normal      naproxen (NAPROSYN) 500 mg tablet Take 1 tablet (500 mg total) by mouth 2 (two) times a day with meals for 7 days, Starting Fri 9/17/2021, Until Fri 9/24/2021, Normal           No discharge procedures on file      PDMP Review     None          ED Provider  Electronically Signed by           Pia Ferrara PA-C  09/17/21 4743

## 2021-11-09 ENCOUNTER — IMMUNIZATIONS (OUTPATIENT)
Dept: FAMILY MEDICINE CLINIC | Facility: HOSPITAL | Age: 56
End: 2021-11-09

## 2021-11-09 DIAGNOSIS — Z23 ENCOUNTER FOR IMMUNIZATION: Primary | ICD-10-CM

## 2021-11-09 PROCEDURE — 0013A COVID-19 MODERNA VACC 0.25 ML BOOSTER: CPT

## 2021-11-09 PROCEDURE — 91306 COVID-19 MODERNA VACC 0.25 ML BOOSTER: CPT

## 2022-10-20 ENCOUNTER — PREP FOR PROCEDURE (OUTPATIENT)
Dept: GASTROENTEROLOGY | Facility: CLINIC | Age: 57
End: 2022-10-20

## 2022-10-20 ENCOUNTER — TELEPHONE (OUTPATIENT)
Dept: GASTROENTEROLOGY | Facility: CLINIC | Age: 57
End: 2022-10-20

## 2022-10-20 ENCOUNTER — OFFICE VISIT (OUTPATIENT)
Dept: INTERNAL MEDICINE CLINIC | Facility: CLINIC | Age: 57
End: 2022-10-20
Payer: COMMERCIAL

## 2022-10-20 VITALS
TEMPERATURE: 98 F | OXYGEN SATURATION: 98 % | RESPIRATION RATE: 15 BRPM | HEART RATE: 83 BPM | DIASTOLIC BLOOD PRESSURE: 86 MMHG | SYSTOLIC BLOOD PRESSURE: 140 MMHG | WEIGHT: 220 LBS | BODY MASS INDEX: 25.98 KG/M2 | HEIGHT: 77 IN

## 2022-10-20 DIAGNOSIS — Z12.5 SCREENING PSA (PROSTATE SPECIFIC ANTIGEN): ICD-10-CM

## 2022-10-20 DIAGNOSIS — D64.9 ANEMIA, UNSPECIFIED TYPE: ICD-10-CM

## 2022-10-20 DIAGNOSIS — J20.9 ACUTE INFECTIVE TRACHEOBRONCHITIS: Primary | ICD-10-CM

## 2022-10-20 DIAGNOSIS — E78.2 MIXED HYPERLIPIDEMIA: ICD-10-CM

## 2022-10-20 DIAGNOSIS — D36.10 NEUROMA: ICD-10-CM

## 2022-10-20 DIAGNOSIS — Z12.11 COLON CANCER SCREENING: Primary | ICD-10-CM

## 2022-10-20 DIAGNOSIS — R73.03 PREDIABETES: ICD-10-CM

## 2022-10-20 DIAGNOSIS — I10 PRIMARY HYPERTENSION: ICD-10-CM

## 2022-10-20 DIAGNOSIS — M17.0 PRIMARY OSTEOARTHRITIS OF BOTH KNEES: ICD-10-CM

## 2022-10-20 DIAGNOSIS — E55.9 VITAMIN D DEFICIENCY: ICD-10-CM

## 2022-10-20 DIAGNOSIS — E03.9 HYPOTHYROIDISM, UNSPECIFIED TYPE: ICD-10-CM

## 2022-10-20 DIAGNOSIS — Z12.11 SCREENING FOR COLON CANCER: ICD-10-CM

## 2022-10-20 DIAGNOSIS — Z12.11 SCREENING FOR COLON CANCER: Primary | ICD-10-CM

## 2022-10-20 PROBLEM — R05.1 ACUTE COUGH: Status: ACTIVE | Noted: 2022-10-20

## 2022-10-20 PROBLEM — F17.200 NICOTINE DEPENDENCE: Status: ACTIVE | Noted: 2017-11-13

## 2022-10-20 PROBLEM — L20.84 INTRINSIC ECZEMA: Status: ACTIVE | Noted: 2022-10-20

## 2022-10-20 PROBLEM — K21.9 GERD (GASTROESOPHAGEAL REFLUX DISEASE): Status: RESOLVED | Noted: 2017-11-13 | Resolved: 2022-10-20

## 2022-10-20 PROBLEM — K21.9 GERD (GASTROESOPHAGEAL REFLUX DISEASE): Status: ACTIVE | Noted: 2017-11-13

## 2022-10-20 PROCEDURE — 99203 OFFICE O/P NEW LOW 30 MIN: CPT | Performed by: INTERNAL MEDICINE

## 2022-10-20 RX ORDER — GUAIFENESIN AND CODEINE PHOSPHATE 100; 10 MG/5ML; MG/5ML
5 SOLUTION ORAL 3 TIMES DAILY PRN
Qty: 118 ML | Refills: 0 | Status: SHIPPED | OUTPATIENT
Start: 2022-10-20 | End: 2022-10-20

## 2022-10-20 RX ORDER — METHYLPREDNISOLONE 4 MG/1
TABLET ORAL
Qty: 21 EACH | Refills: 0 | Status: SHIPPED | OUTPATIENT
Start: 2022-10-20 | End: 2022-10-20

## 2022-10-20 RX ORDER — AZITHROMYCIN 250 MG/1
TABLET, FILM COATED ORAL
Qty: 6 TABLET | Refills: 0 | Status: SHIPPED | OUTPATIENT
Start: 2022-10-20 | End: 2022-10-25

## 2022-10-20 RX ORDER — AZITHROMYCIN 250 MG/1
TABLET, FILM COATED ORAL
Qty: 6 TABLET | Refills: 0 | Status: SHIPPED | OUTPATIENT
Start: 2022-10-20 | End: 2022-10-20

## 2022-10-20 RX ORDER — GUAIFENESIN AND CODEINE PHOSPHATE 100; 10 MG/5ML; MG/5ML
5 SOLUTION ORAL 3 TIMES DAILY PRN
Qty: 118 ML | Refills: 0 | Status: SHIPPED | OUTPATIENT
Start: 2022-10-20

## 2022-10-20 RX ORDER — METHYLPREDNISOLONE 4 MG/1
TABLET ORAL
Qty: 21 EACH | Refills: 0 | Status: SHIPPED | OUTPATIENT
Start: 2022-10-20

## 2022-10-20 NOTE — TELEPHONE ENCOUNTER
10/20/22  Screened by: Abhay Taylor    Referring Provider Balbina Everett    Pre- Screening: There is no height or weight on file to calculate BMI  Has patient been referred for a routine screening Colonoscopy? yes  Is the patient between 39-70 years old? yes      Previous Colonoscopy no   If yes:    Date:     Facility:     Reason:       SCHEDULING STAFF: If the patient is between 45yrs-49yrs, please advise patient to confirm benefits/coverage with their insurance company for a routine screening colonoscopy, some insurance carriers will only cover at [address] or older  If the patient is over 66years old, please schedule an office visit  Does the patient want to see a Gastroenterologist prior to their procedure OR are they having any GI symptoms? no    Has the patient been hospitalized or had abdominal surgery in the past 6 months? no    Does the patient use supplemental oxygen? no    Does the patient take Coumadin, Lovenox, Plavix, Elliquis, Xarelto, or other blood thinning medication? no    Has the patient had a stroke, cardiac event, or stent placed in the past year? no    SCHEDULING STAFF: If patient answers NO to above questions, then schedule procedure  If patient answers YES to above questions, then schedule office appointment  If patient is between 45yrs - 49yrs, please advise patient that we will have to confirm benefits & coverage with their insurance company for a routine screening colonoscopy

## 2022-10-20 NOTE — PATIENT INSTRUCTIONS
Scheduled date of colonoscopy (as of today): 12/7/22  Physician performing colonoscopy: Dr Barksdale Major  Location of colonoscopy: [place] [address]  Bowel prep reviewed with patient: Golytely  Instructions reviewed with patient by: Lisa HUTTON  Clearances:  N/A

## 2022-10-20 NOTE — ASSESSMENT & PLAN NOTE
For now very well controlled without medication possibly prior losing weight the exercise and low-salt diet

## 2022-10-20 NOTE — ASSESSMENT & PLAN NOTE
Symptom of coughing about 3 weeks scanty expectoration scratchy throat treated with Zithromax Medrol Dosepak and cough medicine with codeine suspected bacterial acute infective  tracheobronchitis

## 2022-10-20 NOTE — ASSESSMENT & PLAN NOTE
Counseling done for smoking cessation patient claim has quit smoking since yesterday was recommended if needed can prescribe nicotine patch or chantix  discuss next visit

## 2022-10-20 NOTE — PROGRESS NOTES
Dr Yvette Sevilla Office Visit Note  10/20/22     Anjelica Will 65 y o  male MRN: [id number]  : 1965    Assessment:     1  Acute infective tracheobronchitis  Assessment & Plan:  Symptom of coughing about 3 weeks scanty expectoration scratchy throat treated with Zithromax Medrol Dosepak and cough medicine with codeine suspected bacterial acute infective  tracheobronchitis    Orders:  -     azithromycin (Zithromax) 250 mg tablet; Take 2 tablets (500 mg total) by mouth daily for 1 day, THEN 1 tablet (250 mg total) daily for 4 days  -     methylPREDNISolone 4 MG tablet therapy pack; Use as directed on package  -     guaifenesin-codeine (GUAIFENESIN AC) 100-10 MG/5ML liquid; Take 5 mL by mouth 3 (three) times a day as needed for cough    2  Primary hypertension  Assessment & Plan: For now very well controlled without medication possibly prior losing weight the exercise and low-salt diet    Orders:  -     Comprehensive metabolic panel; Future    3  Mixed hyperlipidemia  Assessment & Plan:  Awaiting lipid profile if needed start statin      4  Anemia, unspecified type  -     Comprehensive metabolic panel; Future  -     CBC and differential; Future    5  Prediabetes  Assessment & Plan:  Home patient's A1c 5 7 months ago will do repeat A1c until then diabetic diet exercise    Orders:  -     Comprehensive metabolic panel; Future  -     Hemoglobin A1C; Future  -     Lipid panel; Future  -     Microalbumin / creatinine urine ratio    6  Vitamin D deficiency  -     Vitamin D Panel; Future    7  Screening for colon cancer  -     Ambulatory referral to Gastroenterology; Future    8  Screening PSA (prostate specific antigen)  -     PSA, Total Screen; Future    9  Hypothyroidism, unspecified type  -     TSH, 3rd generation with Free T4 reflex; Future    10  Neuroma  -     Ambulatory Referral to Hand Surgery; Future    11   Primary osteoarthritis of both knees  Assessment & Plan:  A left knee replaced awaiting right knee replacement was taking Naprosyn now prescribed diclofenac gel 1% 4 times a day    Orders:  -     Diclofenac Sodium (VOLTAREN) 1 %; Apply 2 g topically 4 (four) times a day        Discussion Summary and Plan: Today's care plan and medications were reviewed with patient in detail and all their questions answered to their satisfaction  No chief complaint on file  Subjective:  Patient came in 1st time in the office complaining of coughing with scanty expectoration yellowish in color not improving denied any difficulty breathing fever chills at present time also  also and the last April underwent right total knee replacement and complains of left knee pain with DJD and been advised to have left knee replaced also evaluated by dermatologist for at least foot and suspected eczema hand and feet the eczema seems to be controlled for now and also for blood work colonoscopy in no other new symptoms or new medical problem and also follow-up for prediabetes and hypertension    BMI Counseling: Body mass index is 26 09 kg/m²  The BMI is above normal  Nutrition recommendations include decreasing portion sizes, encouraging healthy choices of fruits and vegetables, decreasing fast food intake, consuming healthier snacks, limiting drinks that contain sugar, moderation in carbohydrate intake, increasing intake of lean protein, reducing intake of saturated and trans fat and reducing intake of cholesterol  Exercise recommendations include vigorous physical activity 75 minutes/week  No pharmacotherapy was ordered  Patient referred to PCP  Rationale for BMI follow-up plan is due to patient being overweight or obese         PHQ-2/9 Depression Screening    Little interest or pleasure in doing things: 0 - not at all  Feeling down, depressed, or hopeless: 0 - not at all  PHQ-2 Score: 0  PHQ-2 Interpretation: Negative depression screen       The following portions of the patient's history were reviewed and updated as appropriate: allergies, current medications, past family history, past medical history, past social history, past surgical history and problem list     Review of Systems   Constitutional: Negative for activity change, appetite change, chills, diaphoresis, fatigue, fever and unexpected weight change  HENT: Negative for congestion, dental problem, drooling, ear discharge, ear pain, facial swelling, hearing loss, mouth sores, nosebleeds, postnasal drip, rhinorrhea, sinus pressure, sneezing, sore throat, tinnitus, trouble swallowing and voice change  Eyes: Negative for photophobia, pain, discharge, redness, itching and visual disturbance  Respiratory: Positive for cough  Negative for apnea, choking, chest tightness, shortness of breath, wheezing and stridor  Cardiovascular: Negative for chest pain, palpitations and leg swelling  Gastrointestinal: Negative for abdominal distention, abdominal pain, anal bleeding, blood in stool, constipation, diarrhea, nausea, rectal pain and vomiting  Endocrine: Negative for cold intolerance, heat intolerance, polydipsia, polyphagia and polyuria  Genitourinary: Negative for decreased urine volume, difficulty urinating, dysuria, enuresis, flank pain, frequency, genital sores, hematuria and urgency  Musculoskeletal: Positive for arthralgias, gait problem, joint swelling and myalgias  Negative for back pain, neck pain and neck stiffness  Skin: Negative for color change, pallor, rash and wound  Allergic/Immunologic: Negative  Negative for environmental allergies, food allergies and immunocompromised state  Neurological: Negative for dizziness, tremors, seizures, syncope, facial asymmetry, speech difficulty, weakness, light-headedness, numbness and headaches  Psychiatric/Behavioral: Negative for agitation, behavioral problems, confusion, decreased concentration, dysphoric mood, hallucinations, self-injury, sleep disturbance and suicidal ideas   The patient is not nervous/anxious and is not hyperactive  Historical Information   Patient Active Problem List   Diagnosis   • Acquired hallux valgus of both feet   • Onychomycosis   • Chronic pain of left knee   • Generalized pruritus   • Chronic diarrhea   • Prediabetes   • BMI 26 0-26 9,adult   • Elevated blood pressure reading   • Nicotine dependence   • Acute cough   • Intrinsic eczema   • Osteoarthritis of both knees   • Acute infective tracheobronchitis   • Primary hypertension   • Mixed hyperlipidemia     Past Medical History:   Diagnosis Date   • GERD (gastroesophageal reflux disease)      Past Surgical History:   Procedure Laterality Date   • COLONOSCOPY N/A 12/22/2017    Procedure: COLONOSCOPY;  Surgeon: Anitha Acuña MD;  Location: [facility]; Service: Gastroenterology   • ESOPHAGOGASTRODUODENOSCOPY N/A 12/22/2017    Procedure: ESOPHAGOGASTRODUODENOSCOPY (EGD); Surgeon: Anitha Acuña MD;  Location: [facility] GI LAB;   Service: Gastroenterology   • NO PAST SURGERIES       Social History     Substance and Sexual Activity   Alcohol Use Not Currently    Comment: socially     Social History     Substance and Sexual Activity   Drug Use No     Social History     Tobacco Use   Smoking Status Current Every Day Smoker   • Packs/day: 0 50   • Years: 20 00   • Pack years: 10 00   Smokeless Tobacco Never Used     Family History   Problem Relation Age of Onset   • Cancer Mother         stomach   • Hypertension Mother    • Liver cancer Mother    • Cancer Father         colon   • Liver cancer Father    • Hypertension Sister    • No Known Problems Brother    • No Known Problems Daughter    • Leukemia Son    • No Known Problems Brother    • No Known Problems Daughter    • No Known Problems Son      Health Maintenance Due   Topic   • Hepatitis C Screening    • COVID-19 Vaccine (1)   • HIV Screening    • Annual Physical    • Pneumococcal Vaccine: Pediatrics (0 to 5 Years) and At-Risk Patients (6 to 59 Years) (2 - PCV)   • Colorectal Cancer Screening • Influenza Vaccine (1)      Meds/Allergies       Current Outpatient Medications:   •  azithromycin (Zithromax) 250 mg tablet, Take 2 tablets (500 mg total) by mouth daily for 1 day, THEN 1 tablet (250 mg total) daily for 4 days  , Disp: 6 tablet, Rfl: 0  •  Diclofenac Sodium (VOLTAREN) 1 %, Apply 2 g topically 4 (four) times a day, Disp: 150 g, Rfl: 0  •  guaifenesin-codeine (GUAIFENESIN AC) 100-10 MG/5ML liquid, Take 5 mL by mouth 3 (three) times a day as needed for cough, Disp: 118 mL, Rfl: 0  •  methylPREDNISolone 4 MG tablet therapy pack, Use as directed on package, Disp: 21 each, Rfl: 0  •  naproxen (NAPROSYN) 500 mg tablet, Take 1 tablet (500 mg total) by mouth 2 (two) times a day with meals for 7 days, Disp: 14 tablet, Rfl: 0  •  cyclobenzaprine (FLEXERIL) 10 mg tablet, Take 1 tablet (10 mg total) by mouth 3 (three) times a day as needed for muscle spasms for up to 3 days, Disp: 9 tablet, Rfl: 0      Objective:    Vitals:   /86   Pulse 83   Temp 98 °F (36 7 °C)   Resp 15   Ht 6' 5" (1 956 m)   Wt 99 8 kg (220 lb)   SpO2 98%   BMI 26 09 kg/m²   Body mass index is 26 09 kg/m²  Vitals:    10/20/22 0901   Weight: 99 8 kg (220 lb)       Physical Exam  Constitutional:       General: He is not in acute distress  Appearance: He is well-developed  He is not ill-appearing, toxic-appearing or diaphoretic  HENT:      Head: Normocephalic and atraumatic  Right Ear: External ear normal       Left Ear: External ear normal       Nose: Nose normal       Mouth/Throat:      Pharynx: No oropharyngeal exudate  Eyes:      General: Lids are normal  Lids are everted, no foreign bodies appreciated  No scleral icterus  Right eye: No discharge  Left eye: No discharge  Conjunctiva/sclera: Conjunctivae normal       Pupils: Pupils are equal, round, and reactive to light  Neck:      Thyroid: No thyromegaly  Vascular: Normal carotid pulses  No carotid bruit, hepatojugular reflux or JVD  Trachea: No tracheal tenderness or tracheal deviation  Cardiovascular:      Rate and Rhythm: Normal rate and regular rhythm  Pulses: Normal pulses  Heart sounds: Normal heart sounds  No murmur heard  No friction rub  No gallop  Pulmonary:      Effort: Pulmonary effort is normal  No respiratory distress  Breath sounds: No stridor  Rhonchi and rales present  No wheezing  Chest:      Chest wall: No tenderness  Abdominal:      General: Bowel sounds are normal  There is no distension  Palpations: Abdomen is soft  There is no mass  Tenderness: There is no abdominal tenderness  There is no guarding or rebound  Musculoskeletal:         General: No tenderness or deformity  Normal range of motion  Cervical back: Normal range of motion and neck supple  No edema, erythema or rigidity  No spinous process tenderness or muscular tenderness  Normal range of motion  Lymphadenopathy:      Head:      Right side of head: No submental, submandibular, tonsillar, preauricular or posterior auricular adenopathy  Left side of head: No submental, submandibular, tonsillar, preauricular, posterior auricular or occipital adenopathy  Cervical: No cervical adenopathy  Right cervical: No superficial, deep or posterior cervical adenopathy  Left cervical: No superficial, deep or posterior cervical adenopathy  Upper Body:      Right upper body: No pectoral adenopathy  Left upper body: No pectoral adenopathy  Skin:     General: Skin is warm and dry  Coloration: Skin is not pale  Findings: No erythema or rash  Neurological:      Mental Status: He is alert and oriented to person, place, and time  Cranial Nerves: No cranial nerve deficit  Sensory: No sensory deficit  Motor: No tremor, abnormal muscle tone or seizure activity  Coordination: Coordination normal       Gait: Gait normal       Deep Tendon Reflexes: Reflexes are normal and symmetric  Reflexes normal    Psychiatric:         Behavior: Behavior normal          Thought Content: Thought content normal          Judgment: Judgment normal          Lab Review   No visits with results within 2 Month(s) from this visit  Latest known visit with results is:   Orders Only on 05/31/2018   Component Date Value Ref Range Status   • Hemoglobin A1C 05/30/2018 5 7   Final         Patient Instructions   Blood work before next visit and appointment for colonoscopy          Carmen Suh        "This note has been constructed using a voice recognition system  Therefore there may be syntax, spelling, and/or grammatical errors   Please call if you have any questions  "

## 2022-10-20 NOTE — ASSESSMENT & PLAN NOTE
A left knee replaced awaiting right knee replacement was taking Naprosyn now prescribed diclofenac gel 1% 4 times a day

## 2022-10-21 ENCOUNTER — TELEPHONE (OUTPATIENT)
Dept: OBGYN CLINIC | Facility: HOSPITAL | Age: 57
End: 2022-10-21

## 2022-10-21 NOTE — TELEPHONE ENCOUNTER
Patient is being referred to a hand specialist  Please schedule accordingly      [address]   [phone]

## 2022-10-26 ENCOUNTER — APPOINTMENT (OUTPATIENT)
Dept: LAB | Facility: CLINIC | Age: 57
End: 2022-10-26

## 2022-10-26 DIAGNOSIS — Z12.5 SCREENING PSA (PROSTATE SPECIFIC ANTIGEN): ICD-10-CM

## 2022-10-26 DIAGNOSIS — I10 PRIMARY HYPERTENSION: ICD-10-CM

## 2022-10-26 DIAGNOSIS — D64.9 ANEMIA, UNSPECIFIED TYPE: ICD-10-CM

## 2022-10-26 DIAGNOSIS — E03.9 HYPOTHYROIDISM, UNSPECIFIED TYPE: ICD-10-CM

## 2022-10-26 DIAGNOSIS — E55.9 VITAMIN D DEFICIENCY: ICD-10-CM

## 2022-10-26 DIAGNOSIS — R73.03 PREDIABETES: ICD-10-CM

## 2022-10-26 LAB
ALBUMIN SERPL BCP-MCNC: 3.3 G/DL (ref 3.5–5)
ALP SERPL-CCNC: 68 U/L (ref 46–116)
ALT SERPL W P-5'-P-CCNC: 39 U/L (ref 12–78)
ANION GAP SERPL CALCULATED.3IONS-SCNC: 3 MMOL/L (ref 4–13)
AST SERPL W P-5'-P-CCNC: 21 U/L (ref 5–45)
BASOPHILS # BLD AUTO: 0.03 THOUSANDS/ÂΜL (ref 0–0.1)
BASOPHILS NFR BLD AUTO: 1 % (ref 0–1)
BILIRUB SERPL-MCNC: 0.3 MG/DL (ref 0.2–1)
BUN SERPL-MCNC: 15 MG/DL (ref 5–25)
CALCIUM ALBUM COR SERPL-MCNC: 9.3 MG/DL (ref 8.3–10.1)
CALCIUM SERPL-MCNC: 8.7 MG/DL (ref 8.3–10.1)
CHLORIDE SERPL-SCNC: 109 MMOL/L (ref 96–108)
CHOLEST SERPL-MCNC: 130 MG/DL
CO2 SERPL-SCNC: 26 MMOL/L (ref 21–32)
CREAT SERPL-MCNC: 0.88 MG/DL (ref 0.6–1.3)
CREAT UR-MCNC: 108 MG/DL
EOSINOPHIL # BLD AUTO: 0.22 THOUSAND/ÂΜL (ref 0–0.61)
EOSINOPHIL NFR BLD AUTO: 4 % (ref 0–6)
ERYTHROCYTE [DISTWIDTH] IN BLOOD BY AUTOMATED COUNT: 15.8 % (ref 11.6–15.1)
EST. AVERAGE GLUCOSE BLD GHB EST-MCNC: 114 MG/DL
GFR SERPL CREATININE-BSD FRML MDRD: 96 ML/MIN/1.73SQ M
GLUCOSE P FAST SERPL-MCNC: 98 MG/DL (ref 65–99)
HBA1C MFR BLD: 5.6 %
HCT VFR BLD AUTO: 37.7 % (ref 36.5–49.3)
HDLC SERPL-MCNC: 48 MG/DL
HGB BLD-MCNC: 11.9 G/DL (ref 12–17)
IMM GRANULOCYTES # BLD AUTO: 0.01 THOUSAND/UL (ref 0–0.2)
IMM GRANULOCYTES NFR BLD AUTO: 0 % (ref 0–2)
LDLC SERPL CALC-MCNC: 63 MG/DL (ref 0–100)
LYMPHOCYTES # BLD AUTO: 2.25 THOUSANDS/ÂΜL (ref 0.6–4.47)
LYMPHOCYTES NFR BLD AUTO: 39 % (ref 14–44)
MCH RBC QN AUTO: 26.4 PG (ref 26.8–34.3)
MCHC RBC AUTO-ENTMCNC: 31.6 G/DL (ref 31.4–37.4)
MCV RBC AUTO: 84 FL (ref 82–98)
MICROALBUMIN UR-MCNC: 6.6 MG/L (ref 0–20)
MICROALBUMIN/CREAT 24H UR: 6 MG/G CREATININE (ref 0–30)
MONOCYTES # BLD AUTO: 0.38 THOUSAND/ÂΜL (ref 0.17–1.22)
MONOCYTES NFR BLD AUTO: 7 % (ref 4–12)
NEUTROPHILS # BLD AUTO: 2.82 THOUSANDS/ÂΜL (ref 1.85–7.62)
NEUTS SEG NFR BLD AUTO: 49 % (ref 43–75)
NONHDLC SERPL-MCNC: 82 MG/DL
NRBC BLD AUTO-RTO: 0 /100 WBCS
PLATELET # BLD AUTO: 216 THOUSANDS/UL (ref 149–390)
PMV BLD AUTO: 10.4 FL (ref 8.9–12.7)
POTASSIUM SERPL-SCNC: 3.6 MMOL/L (ref 3.5–5.3)
PROT SERPL-MCNC: 6.9 G/DL (ref 6.4–8.4)
PSA SERPL-MCNC: 1.9 NG/ML (ref 0–4)
RBC # BLD AUTO: 4.5 MILLION/UL (ref 3.88–5.62)
SODIUM SERPL-SCNC: 138 MMOL/L (ref 135–147)
T4 FREE SERPL-MCNC: 0.84 NG/DL (ref 0.76–1.46)
TRIGL SERPL-MCNC: 95 MG/DL
TSH SERPL DL<=0.05 MIU/L-ACNC: 5.58 UIU/ML (ref 0.45–4.5)
WBC # BLD AUTO: 5.71 THOUSAND/UL (ref 4.31–10.16)

## 2022-10-26 PROCEDURE — 82570 ASSAY OF URINE CREATININE: CPT | Performed by: INTERNAL MEDICINE

## 2022-10-26 PROCEDURE — 82043 UR ALBUMIN QUANTITATIVE: CPT | Performed by: INTERNAL MEDICINE

## 2022-11-02 LAB
25(OH)D2 SERPL-MCNC: <1 NG/ML
25(OH)D3 SERPL-MCNC: 18 NG/ML
25(OH)D3+25(OH)D2 SERPL-MCNC: 18 NG/ML

## 2022-11-07 ENCOUNTER — OFFICE VISIT (OUTPATIENT)
Dept: OBGYN CLINIC | Facility: CLINIC | Age: 57
End: 2022-11-07

## 2022-11-07 ENCOUNTER — PREP FOR PROCEDURE (OUTPATIENT)
Dept: OBGYN CLINIC | Facility: CLINIC | Age: 57
End: 2022-11-07

## 2022-11-07 ENCOUNTER — HOSPITAL ENCOUNTER (OUTPATIENT)
Dept: RADIOLOGY | Facility: HOSPITAL | Age: 57
Discharge: HOME | End: 2022-11-07
Attending: STUDENT IN AN ORGANIZED HEALTH CARE EDUCATION/TRAINING PROGRAM

## 2022-11-07 VITALS — BODY MASS INDEX: 26.28 KG/M2 | HEIGHT: 77 IN | WEIGHT: 222.6 LBS

## 2022-11-07 DIAGNOSIS — D36.10 NEUROMA: ICD-10-CM

## 2022-11-07 DIAGNOSIS — M25.742 OSTEOPHYTE OF LEFT HAND: Primary | ICD-10-CM

## 2022-11-07 RX ORDER — CHLORHEXIDINE GLUCONATE 0.12 MG/ML
15 RINSE ORAL ONCE
OUTPATIENT
Start: 2022-11-07 | End: 2022-11-07

## 2022-11-07 NOTE — H&P (VIEW-ONLY)
ORTHOPAEDIC HAND, WRIST, AND ELBOW OFFICE  VISIT       ASSESSMENT/PLAN:      Diagnoses and all orders for this visit:    Osteophyte of left hand  -     Case request operating room: EXCISION GANGLION CYST - left index finger osteophyte excision; Standing  -     Case request operating room: EXCISION [facility]ANGL[facility] CYST - left index finger osteophyte excision    Neuroma  -     Ambulatory Referral to Hand Surgery  -     XR hand 3+ vw left; Future    Other orders  -     Diet NPO; Sips with meds; Standing  -     Nursing Communication [facility] Interventions Implemented; Standing  -     Nursing Communication CHG bath, have staff wash entire body (neck down) per pre-op bathing protocol  Routine, evening prior to, and day of surgery ; Standing  -     Nursing Communication Swab both nares with Povidone-Iodine solution, EXCLUDE if patient has shellfish/Iodine allergy  Routine, day of surgery, on call to OR; Standing  -     chlorhexidine (PERIDEX) 0 12 % oral rinse 15 mL  -     Void on call to OR; Standing  -     Insert peripheral IV; Standing      65year old male with osteophyte on dorsal aspect of left index distal phalanx/PIP joint     At this time the patient elects to proceed with left DIP joint osteophyte excision  Risks and benefits were discussed  Risks of the surgery are inclusive of but not limited to bleeding, infection, nerve injury, blood clot, worsening of symptoms, not achieving the anticipated results, persistent stiffness, weakness and the need for additional surgery  The patient verbally stated they understood those risks and would like to proceed with the surgery  Surgical consent was signed in the office today  I will see the patient the day of surgery  The patient verbalized understanding of exam findings and treatment plan  We engaged in the shared decision-making process and treatment options were discussed at length with the patient   Surgical and conservative management discussed today along with risks and benefits  Follow Up: After surgery       To Do Next Visit:  Re-evaluation of current issue and X-rays of the  left  index finger      Jonathan Chisholm MD  Attending, Orthopaedic Surgery  Hand, Wrist, and Elbow Surgery  [facility]    ____________________________________________________________________________________________________________________________________________      CHIEF COMPLAINT:  Chief Complaint   Patient presents with   • Left Index Finger - Pain       SUBJECTIVE:  Hermelinda Baron is a 65y o  year old RHD male who presents today for evaluation and treatment of left index finger mass that he noticed began developing a few months ago and has been progressively getting larger  He reports minimal pain associated with the mass, only with impact or direct pressure to the area  He denies previous injury to the finger, denies paraesthesias of his index finger  He works as a  and states the mass does not interfere with his job, or his daily activities and self care  Patient referred by Nolan Mason MD for evaluation  I have personally reviewed all the relevant PMH, PSH, SH, FH, Medications and allergies      PAST MEDICAL HISTORY:  Past Medical History:   Diagnosis Date   • GERD (gastroesophageal reflux disease)        PAST SURGICAL HISTORY:  Past Surgical History:   Procedure Laterality Date   • COLONOSCOPY N/A 12/22/2017    Procedure: COLONOSCOPY;  Surgeon: Prabha Bello MD;  Location: [facility] GI LAB; Service: Gastroenterology   • ESOPHAGOGASTRODUODENOSCOPY N/A 12/22/2017    Procedure: ESOPHAGOGASTRODUODENOSCOPY (EGD); Surgeon: Prabha Bello MD;  Location: [facility] GI LAB;   Service: Gastroenterology   • NO PAST SURGERIES         FAMILY HISTORY:  Family History   Problem Relation Age of Onset   • Cancer Mother         stomach   • Hypertension Mother    • Liver cancer Mother    • Cancer Father         colon   • Liver cancer Father    • Hypertension Sister    • No Known Problems Brother    • No Known Problems Daughter    • Leukemia Son    • No Known Problems Brother    • No Known Problems Daughter    • No Known Problems Son        SOCIAL HISTORY:  Social History     Tobacco Use   • Smoking status: Current Every Day Smoker     Packs/day: 0 50     Years: 20 00     Pack years: 10 00   • Smokeless tobacco: Never Used   Vaping Use   • Vaping Use: Never used   Substance Use Topics   • Alcohol use: Not Currently     Comment: socially   • Drug use: No       MEDICATIONS:    Current Outpatient Medications:   •  Diclofenac Sodium (VOLTAREN) 1 %, Apply 2 g topically 4 (four) times a day, Disp: 150 g, Rfl: 0  •  guaifenesin-codeine (GUAIFENESIN AC) 100-10 MG/5ML liquid, Take 5 mL by mouth 3 (three) times a day as needed for cough, Disp: 118 mL, Rfl: 0  •  methylPREDNISolone 4 MG tablet therapy pack, Use as directed on package, Disp: 21 each, Rfl: 0  •  naproxen (NAPROSYN) 500 mg tablet, Take 1 tablet (500 mg total) by mouth 2 (two) times a day with meals for 7 days, Disp: 14 tablet, Rfl: 0  •  cyclobenzaprine (FLEXERIL) 10 mg tablet, Take 1 tablet (10 mg total) by mouth 3 (three) times a day as needed for muscle spasms for up to 3 days, Disp: 9 tablet, Rfl: 0  •  polyethylene glycol (GOLYTELY) 4000 mL solution, Take 4,000 mL by mouth once for 1 dose Take as directed by office prior to procedure (Patient not taking: Reported on 11/7/2022), Disp: 4000 mL, Rfl: 0    ALLERGIES:  No Known Allergies        REVIEW OF SYSTEMS:  Musculoskeletal:        As noted in HPI  All other systems reviewed and are negative      VITALS:  Vitals:       LABS:  HgA1c:   Lab Results   Component Value Date    HGBA1C 5 6 10/26/2022     BMP:   Lab Results   Component Value Date    CALCIUM 8 7 10/26/2022    K 3 6 10/26/2022    CO2 26 10/26/2022     (H) 10/26/2022    BUN 15 10/26/2022    CREATININE 0 88 10/26/2022       _____________________________________________________  PHYSICAL EXAMINATION:  General: well developed and well nourished, alert, oriented times 3 and appears comfortable  Psychiatric: Normal  HEENT: Normocephalic, Atraumatic Trachea Midline, No torticollis  Pulmonary: No audible wheezing or respiratory distress   Abdomen/GI: Non tender, non distended   Cardiovascular: No pitting edema, 2+ radial pulse   Skin: small patches of hypopigmentation dorsal aspect of all DIP joints  Neurovascular: Sensation Intact to the Median, Ulnar, Radial Nerve, Motor Intact to the Median, Ulnar, Radial Nerve and Pulses Intact  Musculoskeletal: Normal, except as noted in detailed exam and in HPI  MUSCULOSKELETAL EXAMINATION:    5mm x 5mm mass dorsal aspect of left index finger distal phalanx  Mild discoloration dorsal aspect of DIP joints of all 5 digits  No noted edema or ecchymosis  Tender with palpation of the mass  DIP, PIP, MCP joint range of motion normal and pain free  Able to achieve a composite fist  Index finger flexion and extension 5/5 and pain free    ___________________________________________________  STUDIES REVIEWED:  Images of the left index finger obtained today were reviewed in PACS by Dr Manuel Lesches and demonstrate  a 5mm x 5 mm osteophyte dorsal aspect of left index distal phalanx   Mild degenerative changes of DIP joints        PROCEDURES PERFORMED:    No Procedures performed today    _____________________________________________________      Hari Leonard    I,:  Yamila Hughes am acting as a scribe while in the presence of the attending physician :       I,:  Gustav Koyanagi, MD personally performed the services described in this documentation    as scribed in my presence :

## 2022-11-07 NOTE — PROGRESS NOTES
ORTHOPAEDIC HAND, WRIST, AND ELBOW OFFICE  VISIT       ASSESSMENT/PLAN:      Diagnoses and all orders for this visit:    Osteophyte of left hand  -     Case request operating room: [facility] - left index finger osteophyte excision; Standing  -     Case request operating room: [facility] - left index finger osteophyte excision    Neuroma  -     Ambulatory Referral to Hand Surgery  -     XR hand 3+ vw left; Future    Other orders  -     Diet NPO; Sips with meds; Standing  -     Nursing Communication [facility] Interventions Implemented; Standing  -     Nursing Communication CHG bath, have staff wash entire body (neck down) per pre-op bathing protocol  Routine, evening prior to, and day of surgery ; Standing  -     Nursing Communication Swab both nares with Povidone-Iodine solution, EXCLUDE if patient has shellfish/Iodine allergy  Routine, day of surgery, on call to OR; Standing  -     chlorhexidine (PERIDEX) 0 12 % oral rinse 15 mL  -     Void on call to OR; Standing  -     Insert peripheral IV; Standing      65year old male with osteophyte on dorsal aspect of left index distal phalanx/PIP joint     At this time the patient elects to proceed with left PIP joint osteophyte excision  Risks and benefits were discussed  Risks of the surgery are inclusive of but not limited to bleeding, infection, nerve injury, blood clot, worsening of symptoms, not achieving the anticipated results, persistent stiffness, weakness and the need for additional surgery  The patient verbally stated they understood those risks and would like to proceed with the surgery  Surgical consent was signed in the office today  I will see the patient the day of surgery  The patient verbalized understanding of exam findings and treatment plan  We engaged in the shared decision-making process and treatment options were discussed at length with the patient   Surgical and conservative management discussed today along with risks and benefits  Follow Up: After surgery       To Do Next Visit:  Re-evaluation of current issue and X-rays of the  left  index finger      Mariella Kelly MD  Attending, Orthopaedic Surgery  Hand, Wrist, and Elbow Surgery  [facility]    ____________________________________________________________________________________________________________________________________________      CHIEF COMPLAINT:  Chief Complaint   Patient presents with   • Left Index Finger - Pain       SUBJECTIVE:  Eleazar Carrillo is a 65y o  year old RHD male who presents today for evaluation and treatment of left index finger mass that he noticed began developing a few months ago and has been progressively getting larger  He reports minimal pain associated with the mass, only with impact or direct pressure to the area  He denies previous injury to the finger, denies paraesthesias of his index finger  He works as a  and states the mass does not interfere with his job, or his daily activities and self care  Patient referred by Ruben Garcia MD for evaluation  I have personally reviewed all the relevant PMH, PSH, SH, FH, Medications and allergies      PAST MEDICAL HISTORY:  Past Medical History:   Diagnosis Date   • GERD (gastroesophageal reflux disease)        PAST SURGICAL HISTORY:  Past Surgical History:   Procedure Laterality Date   • COLONOSCOPY N/A 12/22/2017    Procedure: COLONOSCOPY;  Surgeon: Laila Peterson MD;  Location: [facility]; Service: Gastroenterology   • ESOPHAGOGASTRODUODENOSCOPY N/A 12/22/2017    Procedure: ESOPHAGOGASTRODUODENOSCOPY (EGD); Surgeon: Laila Peterson MD;  Location: [facility];   Service: Gastroenterology   • NO PAST SURGERIES         FAMILY HISTORY:  Family History   Problem Relation Age of Onset   • Cancer Mother         stomach   • Hypertension Mother    • Liver cancer Mother    • Cancer Father         colon   • Liver cancer Father    • Hypertension Sister    • No Known Problems Brother    • No Known Problems Daughter    • Leukemia Son    • No Known Problems Brother    • No Known Problems Daughter    • No Known Problems Son        SOCIAL HISTORY:  Social History     Tobacco Use   • Smoking status: Current Every Day Smoker     Packs/day: 0 50     Years: 20 00     Pack years: 10 00   • Smokeless tobacco: Never Used   Vaping Use   • Vaping Use: Never used   Substance Use Topics   • Alcohol use: Not Currently     Comment: socially   • Drug use: No       MEDICATIONS:    Current Outpatient Medications:   •  Diclofenac Sodium (VOLTAREN) 1 %, Apply 2 g topically 4 (four) times a day, Disp: 150 g, Rfl: 0  •  guaifenesin-codeine (GUAIFENESIN AC) 100-10 MG/5ML liquid, Take 5 mL by mouth 3 (three) times a day as needed for cough, Disp: 118 mL, Rfl: 0  •  methylPREDNISolone 4 MG tablet therapy pack, Use as directed on package, Disp: 21 each, Rfl: 0  •  naproxen (NAPROSYN) 500 mg tablet, Take 1 tablet (500 mg total) by mouth 2 (two) times a day with meals for 7 days, Disp: 14 tablet, Rfl: 0  •  cyclobenzaprine (FLEXERIL) 10 mg tablet, Take 1 tablet (10 mg total) by mouth 3 (three) times a day as needed for muscle spasms for up to 3 days, Disp: 9 tablet, Rfl: 0  •  polyethylene glycol (GOLYTELY) 4000 mL solution, Take 4,000 mL by mouth once for 1 dose Take as directed by office prior to procedure (Patient not taking: Reported on 11/7/2022), Disp: 4000 mL, Rfl: 0    ALLERGIES:  No Known Allergies        REVIEW OF SYSTEMS:  Musculoskeletal:        As noted in HPI  All other systems reviewed and are negative      VITALS:  Vitals:       LABS:  HgA1c:   Lab Results   Component Value Date    HGBA1C 5 6 10/26/2022     BMP:   Lab Results   Component Value Date    CALCIUM 8 7 10/26/2022    K 3 6 10/26/2022    CO2 26 10/26/2022     (H) 10/26/2022    BUN 15 10/26/2022    CREATININE 0 88 10/26/2022       _____________________________________________________  PHYSICAL EXAMINATION:  General: well developed and well nourished, alert, oriented times 3 and appears comfortable  Psychiatric: Normal  HEENT: Normocephalic, Atraumatic Trachea Midline, No torticollis  Pulmonary: No audible wheezing or respiratory distress   Abdomen/GI: Non tender, non distended   Cardiovascular: No pitting edema, 2+ radial pulse   Skin: small patches of hypopigmentation dorsal aspect of all DIP joints  Neurovascular: Sensation Intact to the Median, Ulnar, Radial Nerve, Motor Intact to the Median, Ulnar, Radial Nerve and Pulses Intact  Musculoskeletal: Normal, except as noted in detailed exam and in HPI  MUSCULOSKELETAL EXAMINATION:    5mm x 5mm mass dorsal aspect of left index finger distal phalanx  Mild discoloration dorsal aspect of DIP joints of all 5 digits  No noted edema or ecchymosis  Tender with palpation of the mass  DIP, PIP, MCP joint range of motion normal and pain free  Able to achieve a composite fist  Index finger flexion and extension 5/5 and pain free    ___________________________________________________  STUDIES REVIEWED:  Images of the left index finger obtained today were reviewed in PACS by Dr Won Rhoades and demonstrate  a 5mm x 5 mm osteophyte dorsal aspect of left index distal phalanx   Mild degenerative changes of DIP joints        PROCEDURES PERFORMED:    No Procedures performed today    _____________________________________________________      [facility]    I,:  Constantin Muniz am acting as a scribe while in the presence of the attending physician :       I,:  Gatito Harding MD personally performed the services described in this documentation    as scribed in my presence :

## 2022-11-10 ENCOUNTER — OFFICE VISIT (OUTPATIENT)
Dept: INTERNAL MEDICINE CLINIC | Facility: CLINIC | Age: 57
End: 2022-11-10

## 2022-11-10 ENCOUNTER — HOSPITAL ENCOUNTER (OUTPATIENT)
Dept: RADIOLOGY | Facility: HOSPITAL | Age: 57
Discharge: HOME | End: 2022-11-10

## 2022-11-10 VITALS
SYSTOLIC BLOOD PRESSURE: 138 MMHG | BODY MASS INDEX: 26.45 KG/M2 | HEIGHT: 77 IN | DIASTOLIC BLOOD PRESSURE: 80 MMHG | HEART RATE: 83 BPM | RESPIRATION RATE: 16 BRPM | OXYGEN SATURATION: 98 % | TEMPERATURE: 98.1 F | WEIGHT: 224 LBS

## 2022-11-10 DIAGNOSIS — J20.9 ACUTE INFECTIVE TRACHEOBRONCHITIS: ICD-10-CM

## 2022-11-10 DIAGNOSIS — I10 PRIMARY HYPERTENSION: Primary | ICD-10-CM

## 2022-11-10 DIAGNOSIS — R05.3 CHRONIC COUGH: ICD-10-CM

## 2022-11-10 DIAGNOSIS — E03.9 HYPOTHYROIDISM, UNSPECIFIED TYPE: ICD-10-CM

## 2022-11-10 DIAGNOSIS — E55.9 VITAMIN D DEFICIENCY: ICD-10-CM

## 2022-11-10 DIAGNOSIS — R73.03 PREDIABETES: ICD-10-CM

## 2022-11-10 RX ORDER — METHYLPREDNISOLONE 4 MG/1
TABLET ORAL
Qty: 21 EACH | Refills: 0 | Status: SHIPPED | OUTPATIENT
Start: 2022-11-10

## 2022-11-10 RX ORDER — ERGOCALCIFEROL 1.25 MG/1
50000 CAPSULE ORAL WEEKLY
Qty: 12 CAPSULE | Refills: 0 | Status: SHIPPED | OUTPATIENT
Start: 2022-11-10

## 2022-11-10 RX ORDER — GUAIFENESIN AND CODEINE PHOSPHATE 100; 10 MG/5ML; MG/5ML
10 SOLUTION ORAL 4 TIMES DAILY PRN
Qty: 118 ML | Refills: 1 | Status: SHIPPED | OUTPATIENT
Start: 2022-11-10

## 2022-11-10 RX ORDER — LEVOTHYROXINE SODIUM 0.07 MG/1
75 TABLET ORAL
Qty: 90 TABLET | Refills: 0 | Status: SHIPPED | OUTPATIENT
Start: 2022-11-10

## 2022-11-10 RX ORDER — CEFUROXIME AXETIL 500 MG/1
500 TABLET ORAL EVERY 12 HOURS SCHEDULED
Qty: 14 TABLET | Refills: 0 | Status: SHIPPED | OUTPATIENT
Start: 2022-11-10 | End: 2022-11-17

## 2022-11-10 NOTE — PATIENT INSTRUCTIONS
Acute Cough   WHAT YOU NEED TO KNOW:   An acute cough can last up to 3 weeks  Common causes of an acute cough include a cold, allergies, or a lung infection  DISCHARGE INSTRUCTIONS:   Return to the emergency department if:   You have trouble breathing or feel short of breath  You cough up blood, or you see blood in your mucus  You faint or feel weak or dizzy  You have chest pain when you cough or take a deep breath  You have new wheezing  Contact your healthcare provider if:   You have a fever  Your cough lasts longer than 4 weeks  Your symptoms do not improve with treatment  You have questions or concerns about your condition or care  Medicines:   Medicines  may be needed to stop the cough, decrease swelling in your airways, or help open your airways  Medicine may also be given to help you cough up mucus  Ask your healthcare provider what over-the-counter medicines you can take  If you have an infection caused by bacteria, you may need antibiotics  Take your medicine as directed  Contact your healthcare provider if you think your medicine is not helping or if you have side effects  Tell him or her if you are allergic to any medicine  Keep a list of the medicines, vitamins, and herbs you take  Include the amounts, and when and why you take them  Bring the list or the pill bottles to follow-up visits  Carry your medicine list with you in case of an emergency  Manage your symptoms:   Do not smoke and stay away from others who smoke  Nicotine and other chemicals in cigarettes and cigars can cause lung damage and make your cough worse  Ask your healthcare provider for information if you currently smoke and need help to quit  E-cigarettes or smokeless tobacco still contain nicotine  Talk to your healthcare provider before you use these products  Drink extra liquids as directed  Liquids will help thin and loosen mucus so you can cough it up   Liquids will also help prevent dehydration  Examples of good liquids to drink include water, fruit juice, and broth  Do not drink liquids that contain caffeine  Caffeine can increase your risk for dehydration  Ask your healthcare provider how much liquid to drink each day  Rest as directed  Do not do activities that make your cough worse, such as exercise  Use a humidifier or vaporizer  Use a cool mist humidifier or a vaporizer to increase air moisture in your home  This may make it easier for you to breathe and help decrease your cough  Eat 2 to 5 mL of honey 2 times each day  Honey can help thin mucus and decrease your cough  Use cough drops or lozenges  These can help decrease throat irritation and your cough  Follow up with your healthcare provider as directed:  Write down your questions so you remember to ask them during your visits  © Copyright [employer] 2022 Information is for End User's use only and may not be sold, redistributed or otherwise used for commercial purposes  All illustrations and images included in CareNotes® are the copyrighted property of [employer]  or [address] [place], [place] [address]rickey Armstrong   The above information is an  only  It is not intended as medical advice for individual conditions or treatments  Talk to your doctor, nurse or pharmacist before following any medical regimen to see if it is safe and effective for you

## 2022-11-10 NOTE — PROGRESS NOTES
Dr Sidra Daniel Office Visit Note  11/10/22     Rick Rico 65 y o  male MRN: [id number]  : 1965    Assessment:     1  Chronic cough  -     XR chest pa & lateral; Future; Expected date: 11/10/2022    2  Acute infective tracheobronchitis  -     cefuroxime (CEFTIN) 500 mg tablet; Take 1 tablet (500 mg total) by mouth every 12 (twelve) hours for 7 days  -     methylPREDNISolone 4 MG tablet therapy pack; Use as directed on package  -     guaifenesin-codeine (GUAIFENESIN AC) 100-10 MG/5ML liquid; Take 10 mL by mouth 4 (four) times a day as needed for cough  -     XR chest pa & lateral; Future; Expected date: 11/10/2022    3  Prediabetes    4  Primary hypertension    5  Vitamin D deficiency  -     ergocalciferol (VITAMIN D2) 50,000 units; Take 1 capsule (50,000 Units total) by mouth once a week    6  Hypothyroidism, unspecified type  -     levothyroxine (Synthroid) 75 mcg tablet; Take 1 tablet (75 mcg total) by mouth daily in the early morning          Discussion Summary and Plan: Today's care plan and medications were reviewed with patient in detail and all their questions answered to their satisfaction  No chief complaint on file  Subjective:  Came in complaining of persistent coughing a previously treated with Zithromax no improvement in fact patient claims got worse but no difficulty breathing no fever chills tested COVID negative twice no other associated symptom had a blood work done which shows low vitamin-D treated with vitamin-D 50,000 once a week along with A1c borderline and TSH was high so started on Synthroid all the blood work reviewed with the patient at length      The following portions of the patient's history were reviewed and updated as appropriate: allergies, current medications, past family history, past medical history, past social history, past surgical history and problem list     Review of Systems   Constitutional: Positive for fatigue   Negative for activity change, appetite change, chills, diaphoresis, fever and unexpected weight change  HENT: Positive for congestion and sinus pressure  Negative for dental problem, drooling, ear discharge, ear pain, facial swelling, hearing loss, mouth sores, nosebleeds, postnasal drip, rhinorrhea, sneezing, sore throat, tinnitus, trouble swallowing and voice change  Eyes: Negative for photophobia, pain, discharge, redness, itching and visual disturbance  Respiratory: Positive for cough  Negative for apnea, choking, chest tightness, shortness of breath, wheezing and stridor  Cardiovascular: Negative for chest pain, palpitations and leg swelling  Gastrointestinal: Negative for abdominal distention, abdominal pain, anal bleeding, blood in stool, constipation, diarrhea, nausea, rectal pain and vomiting  Endocrine: Negative for cold intolerance, heat intolerance, polydipsia, polyphagia and polyuria  Genitourinary: Negative for decreased urine volume, difficulty urinating, dysuria, enuresis, flank pain, frequency, genital sores, hematuria and urgency  Musculoskeletal: Negative for arthralgias, back pain, gait problem, joint swelling, myalgias, neck pain and neck stiffness  Skin: Negative for color change, pallor, rash and wound  Allergic/Immunologic: Negative  Negative for environmental allergies, food allergies and immunocompromised state  Neurological: Negative for dizziness, tremors, seizures, syncope, facial asymmetry, speech difficulty, weakness, light-headedness, numbness and headaches  Psychiatric/Behavioral: Negative for agitation, behavioral problems, confusion, decreased concentration, dysphoric mood, hallucinations, self-injury, sleep disturbance and suicidal ideas  The patient is not nervous/anxious and is not hyperactive            Historical Information   Patient Active Problem List   Diagnosis   • Acquired hallux valgus of both feet   • Onychomycosis   • Chronic pain of left knee   • Generalized pruritus   • Chronic diarrhea • Prediabetes   • BMI 26 0-26 9,adult   • Elevated blood pressure reading   • Nicotine dependence   • Acute cough   • Intrinsic eczema   • Osteoarthritis of both knees   • Acute infective tracheobronchitis   • Primary hypertension   • Mixed hyperlipidemia     Past Medical History:   Diagnosis Date   • GERD (gastroesophageal reflux disease)      Past Surgical History:   Procedure Laterality Date   • COLONOSCOPY N/A 12/22/2017    Procedure: COLONOSCOPY;  Surgeon: Polina Neff MD;  Location: [facility] GI LAB; Service: Gastroenterology   • ESOPHAGOGASTRODUODENOSCOPY N/A 12/22/2017    Procedure: ESOPHAGOGASTRODUODENOSCOPY (EGD); Surgeon: Polina Neff MD;  Location: [facility] GI LAB;   Service: Gastroenterology   • NO PAST SURGERIES       Social History     Substance and Sexual Activity   Alcohol Use Not Currently    Comment: socially     Social History     Substance and Sexual Activity   Drug Use No     Social History     Tobacco Use   Smoking Status Current Every Day Smoker   • Packs/day: 0 50   • Years: 20 00   • Pack years: 10 00   Smokeless Tobacco Never Used     Family History   Problem Relation Age of Onset   • Cancer Mother         stomach   • Hypertension Mother    • Liver cancer Mother    • Cancer Father         colon   • Liver cancer Father    • Hypertension Sister    • No Known Problems Brother    • No Known Problems Daughter    • Leukemia Son    • No Known Problems Brother    • No Known Problems Daughter    • No Known Problems Son      Health Maintenance Due   Topic   • Hepatitis C Screening    • COVID-19 Vaccine (1)   • HIV Screening    • Annual Physical    • Pneumococcal Vaccine: Pediatrics (0 to 5 Years) and At-Risk Patients (6 to 59 Years) (2 - PCV)   • Colorectal Cancer Screening    • Influenza Vaccine (1)      Meds/Allergies       Current Outpatient Medications:   •  cefuroxime (CEFTIN) 500 mg tablet, Take 1 tablet (500 mg total) by mouth every 12 (twelve) hours for 7 days, Disp: 14 tablet, Rfl: 0  •  ergocalciferol (VITAMIN D2) 50,000 units, Take 1 capsule (50,000 Units total) by mouth once a week, Disp: 12 capsule, Rfl: 0  •  guaifenesin-codeine (GUAIFENESIN AC) 100-10 MG/5ML liquid, Take 10 mL by mouth 4 (four) times a day as needed for cough, Disp: 118 mL, Rfl: 1  •  levothyroxine (Synthroid) 75 mcg tablet, Take 1 tablet (75 mcg total) by mouth daily in the early morning, Disp: 90 tablet, Rfl: 0  •  methylPREDNISolone 4 MG tablet therapy pack, Use as directed on package, Disp: 21 each, Rfl: 0  •  naproxen (NAPROSYN) 500 mg tablet, Take 1 tablet (500 mg total) by mouth 2 (two) times a day with meals for 7 days, Disp: 14 tablet, Rfl: 0  •  cyclobenzaprine (FLEXERIL) 10 mg tablet, Take 1 tablet (10 mg total) by mouth 3 (three) times a day as needed for muscle spasms for up to 3 days, Disp: 9 tablet, Rfl: 0  •  methylPREDNISolone 4 MG tablet therapy pack, Use as directed on package (Patient not taking: Reported on 11/10/2022), Disp: 21 each, Rfl: 0      Objective:    Vitals:   /90   Pulse 83   Temp 98 1 °F (36 7 °C)   Resp 16   Ht 6' 5" (1 956 m)   Wt 102 kg (224 lb)   SpO2 98%   BMI 26 56 kg/m²   Body mass index is 26 56 kg/m²  Vitals:    11/10/22 0907   Weight: 102 kg (224 lb)       Physical Exam  Constitutional:       General: He is not in acute distress  Appearance: He is well-developed  He is not ill-appearing, toxic-appearing or diaphoretic  HENT:      Head: Normocephalic and atraumatic  Right Ear: External ear normal       Left Ear: External ear normal       Nose: Nose normal       Mouth/Throat:      Pharynx: No oropharyngeal exudate  Eyes:      General: Lids are normal  Lids are everted, no foreign bodies appreciated  No scleral icterus  Right eye: No discharge  Left eye: No discharge  Conjunctiva/sclera: Conjunctivae normal       Pupils: Pupils are equal, round, and reactive to light  Neck:      Thyroid: No thyromegaly        Vascular: Normal carotid pulses  No carotid bruit, hepatojugular reflux or JVD  Trachea: No tracheal tenderness or tracheal deviation  Cardiovascular:      Rate and Rhythm: Normal rate and regular rhythm  Pulses: Normal pulses  Heart sounds: Normal heart sounds  No murmur heard  No friction rub  No gallop  Pulmonary:      Effort: Pulmonary effort is normal  No respiratory distress  Breath sounds: No stridor  Rhonchi present  No wheezing or rales  Chest:      Chest wall: No tenderness  Abdominal:      General: Bowel sounds are normal  There is no distension  Palpations: Abdomen is soft  There is no mass  Tenderness: There is no abdominal tenderness  There is no guarding or rebound  Musculoskeletal:         General: No tenderness or deformity  Normal range of motion  Cervical back: Normal range of motion and neck supple  No edema, erythema or rigidity  No spinous process tenderness or muscular tenderness  Normal range of motion  Lymphadenopathy:      Head:      Right side of head: No submental, submandibular, tonsillar, preauricular or posterior auricular adenopathy  Left side of head: No submental, submandibular, tonsillar, preauricular, posterior auricular or occipital adenopathy  Cervical: No cervical adenopathy  Right cervical: No superficial, deep or posterior cervical adenopathy  Left cervical: No superficial, deep or posterior cervical adenopathy  Upper Body:      Right upper body: No pectoral adenopathy  Left upper body: No pectoral adenopathy  Skin:     General: Skin is warm and dry  Coloration: Skin is not pale  Findings: No erythema or rash  Neurological:      Mental Status: He is alert and oriented to person, place, and time  Cranial Nerves: No cranial nerve deficit  Sensory: No sensory deficit  Motor: No tremor, abnormal muscle tone or seizure activity        Coordination: Coordination normal       Gait: Gait normal  Deep Tendon Reflexes: Reflexes are normal and symmetric  Reflexes normal    Psychiatric:         Behavior: Behavior normal          Thought Content: Thought content normal          Judgment: Judgment normal          Lab Review   Appointment on 10/26/2022   Component Date Value Ref Range Status   • Sodium 10/26/2022 138  135 - 147 mmol/L Final   • Potassium 10/26/2022 3 6  3 5 - 5 3 mmol/L Final   • Chloride 10/26/2022 109 (A) 96 - 108 mmol/L Final   • CO2 10/26/2022 26  21 - 32 mmol/L Final   • ANION GAP 10/26/2022 3 (A) 4 - 13 mmol/L Final   • BUN 10/26/2022 15  5 - 25 mg/dL Final   • Creatinine 10/26/2022 0 88  0 60 - 1 30 mg/dL Final    Standardized to IDMS reference method   • Glucose, Fasting 10/26/2022 98  65 - 99 mg/dL Final    Specimen collection should occur prior to Sulfasalazine administration due to the potential for falsely depressed results  Specimen collection should occur prior to Sulfapyridine administration due to the potential for falsely elevated results  • Calcium 10/26/2022 8 7  8 3 - 10 1 mg/dL Final   • Corrected Calcium 10/26/2022 9 3  8 3 - 10 1 mg/dL Final   • AST 10/26/2022 21  5 - 45 U/L Final    Specimen collection should occur prior to Sulfasalazine administration due to the potential for falsely depressed results  • ALT 10/26/2022 39  12 - 78 U/L Final    Specimen collection should occur prior to Sulfasalazine and/or Sulfapyridine administration due to the potential for falsely depressed results  • Alkaline Phosphatase 10/26/2022 68  46 - 116 U/L Final   • Total Protein 10/26/2022 6 9  6 4 - 8 4 g/dL Final   • Albumin 10/26/2022 3 3 (A) 3 5 - 5 0 g/dL Final   • Total Bilirubin 10/26/2022 0 30  0 20 - 1 00 mg/dL Final    Use of this assay is not recommended for patients undergoing treatment with eltrombopag due to the potential for falsely elevated results     • eGFR 10/26/2022 96  ml/min/1 73sq m Final   • WBC 10/26/2022 5 71  4 31 - 10 16 Thousand/uL Final   • RBC 10/26/2022 4  50  3 88 - 5 62 Million/uL Final   • Hemoglobin 10/26/2022 11 9 (A) 12 0 - 17 0 g/dL Final   • Hematocrit 10/26/2022 37 7  36 5 - 49 3 % Final   • MCV 10/26/2022 84  82 - 98 fL Final   • MCH 10/26/2022 26 4 (A) 26 8 - 34 3 pg Final   • MCHC 10/26/2022 31 6  31 4 - 37 4 g/dL Final   • RDW 10/26/2022 15 8 (A) 11 6 - 15 1 % Final   • MPV 10/26/2022 10 4  8 9 - 12 7 fL Final   • Platelets 38/12/9153 216  149 - 390 Thousands/uL Final   • nRBC 10/26/2022 0  /100 WBCs Final   • Neutrophils Relative 10/26/2022 49  43 - 75 % Final   • Immat GRANS % 10/26/2022 0  0 - 2 % Final   • Lymphocytes Relative 10/26/2022 39  14 - 44 % Final   • Monocytes Relative 10/26/2022 7  4 - 12 % Final   • Eosinophils Relative 10/26/2022 4  0 - 6 % Final   • Basophils Relative 10/26/2022 1  0 - 1 % Final   • Neutrophils Absolute 10/26/2022 2 82  1 85 - 7 62 Thousands/µL Final   • Immature Grans Absolute 10/26/2022 0 01  0 00 - 0 20 Thousand/uL Final   • Lymphocytes Absolute 10/26/2022 2 25  0 60 - 4 47 Thousands/µL Final   • Monocytes Absolute 10/26/2022 0 38  0 17 - 1 22 Thousand/µL Final   • Eosinophils Absolute 10/26/2022 0 22  0 00 - 0 61 Thousand/µL Final   • Basophils Absolute 10/26/2022 0 03  0 00 - 0 10 Thousands/µL Final   • Hemoglobin A1C 10/26/2022 5 6  Normal 3 8-5 6%; PreDiabetic 5 7-6 4%;  Diabetic >=6 5%; Glycemic control for adults with diabetes <7 0% % Final   • EAG 10/26/2022 114  mg/dl Final   • Cholesterol 10/26/2022 130  See Comment mg/dL Final    Cholesterol:         Pediatric <18 Years        Desirable          <170 mg/dL      Borderline High    170-199 mg/dL      High               >=200 mg/dL        Adult >=18 Years            Desirable         <200 mg/dL      Borderline High   200-239 mg/dL      High              >239 mg/dL     • Triglycerides 10/26/2022 95  See Comment mg/dL Final    Triglyceride:     0-9Y            <75mg/dL     10Y-17Y         <90 mg/dL       >=18Y     Normal          <150 mg/dL     Borderline High 150-199 mg/dL     High            200-499 mg/dL        Very High       >499 mg/dL    Specimen collection should occur prior to N-Acetylcysteine or Metamizole administration due to the potential for falsely depressed results  • HDL, Direct 10/26/2022 48  >=40 mg/dL Final    Specimen collection should occur prior to Metamizole administration due to the potential for falsley depressed results  • LDL Calculated 10/26/2022 63  0 - 100 mg/dL Final    LDL Cholesterol:     Optimal           <100 mg/dl     Near Optimal      100-129 mg/dl     Above Optimal       Borderline High 130-159 mg/dl       High            160-189 mg/dl       Very High       >189 mg/dl         This screening LDL is a calculated result  It does not have the accuracy of the Direct Measured LDL in the monitoring of patients with hyperlipidemia and/or statin therapy  Direct Measure LDL (ZZZ819) must be ordered separately in these patients  • Non-HDL-Chol (CHOL-HDL) 10/26/2022 82  mg/dl Final   • 25-HYDROXY VIT D 10/26/2022 18 (A) ng/mL Final    Reference Range: All Ages: Target levels 30 - 100   • 25-Hydroxy D2 10/26/2022 <1 0  ng/mL Final    This test was developed and its performance characteristics  determined by [facility]  It has not been cleared or approved  by the Food and Drug Administration  • 25-HYDROXY VIT D3 10/26/2022 18  ng/mL Final    This test was developed and its performance characteristics  determined by [facility]  It has not been cleared or approved  by the Food and Drug Administration  • PSA 10/26/2022 1 9  0 0 - 4 0 ng/mL Final    American Urological Association Guidelines define biochemical recurrence of prostate cancer as a detectable or rising PSA value post-radical prostatectomy that is greater than or equal to 0 2 ng/mL with a second confirmatory level of greater than or equal to 0 2 ng/mL     • TSH 3RD GENERATON 10/26/2022 5 580 (A) 0 450 - 4 500 uIU/mL Final    Adult TSH (3rd generation) reference range follows the recommended guidelines of the American Thyroid Association, January, 2020  • Free T4 10/26/2022 0 84  0 76 - 1 46 ng/dL Final    Specimen collection should occur prior to Sulfasalazine administration due to the potential for falsely elevated results  Office Visit on 10/20/2022   Component Date Value Ref Range Status   • Creatinine, Ur 10/26/2022 108 0  mg/dL Final   • Microalbum  ,U,Random 10/26/2022 6 6  0 0 - 20 0 mg/L Final   • Microalb Creat Ratio 10/26/2022 6  0 - 30 mg/g creatinine Final         Patient Instructions   Acute Cough   WHAT YOU NEED TO KNOW:   An acute cough can last up to 3 weeks  Common causes of an acute cough include a cold, allergies, or a lung infection  DISCHARGE INSTRUCTIONS:   Return to the emergency department if:   · You have trouble breathing or feel short of breath  · You cough up blood, or you see blood in your mucus  · You faint or feel weak or dizzy  · You have chest pain when you cough or take a deep breath  · You have new wheezing  Contact your healthcare provider if:   · You have a fever  · Your cough lasts longer than 4 weeks  · Your symptoms do not improve with treatment  · You have questions or concerns about your condition or care  Medicines:   · Medicines  may be needed to stop the cough, decrease swelling in your airways, or help open your airways  Medicine may also be given to help you cough up mucus  Ask your healthcare provider what over-the-counter medicines you can take  If you have an infection caused by bacteria, you may need antibiotics  · Take your medicine as directed  Contact your healthcare provider if you think your medicine is not helping or if you have side effects  Tell him or her if you are allergic to any medicine  Keep a list of the medicines, vitamins, and herbs you take  Include the amounts, and when and why you take them  Bring the list or the pill bottles to follow-up visits   Carry your medicine list with you in case of an emergency  Manage your symptoms:   · Do not smoke and stay away from others who smoke  Nicotine and other chemicals in cigarettes and cigars can cause lung damage and make your cough worse  Ask your healthcare provider for information if you currently smoke and need help to quit  E-cigarettes or smokeless tobacco still contain nicotine  Talk to your healthcare provider before you use these products  · Drink extra liquids as directed  Liquids will help thin and loosen mucus so you can cough it up  Liquids will also help prevent dehydration  Examples of good liquids to drink include water, fruit juice, and broth  Do not drink liquids that contain caffeine  Caffeine can increase your risk for dehydration  Ask your healthcare provider how much liquid to drink each day  · Rest as directed  Do not do activities that make your cough worse, such as exercise  · Use a humidifier or vaporizer  Use a cool mist humidifier or a vaporizer to increase air moisture in your home  This may make it easier for you to breathe and help decrease your cough  · Eat 2 to 5 mL of honey 2 times each day  Honey can help thin mucus and decrease your cough  · Use cough drops or lozenges  These can help decrease throat irritation and your cough  Follow up with your healthcare provider as directed:  Write down your questions so you remember to ask them during your visits  © Copyright [employer] 2022 Information is for End User's use only and may not be sold, redistributed or otherwise used for commercial purposes  All illustrations and images included in CareNotes® are the copyrighted property of [employer]  or [employer]   The above information is an  only  It is not intended as medical advice for individual conditions or treatments  Talk to your doctor, nurse or pharmacist before following any medical regimen to see if it is safe and effective for you           Jovanni Sheffield MD        "This note has been constructed using a voice recognition system  Therefore there may be syntax, spelling, and/or grammatical errors   Please call if you have any questions  "

## 2022-11-10 NOTE — ASSESSMENT & PLAN NOTE
Patient's blood pressure seems controlled for now on a low-salt diet and exercise will closely monitor

## 2022-11-10 NOTE — ASSESSMENT & PLAN NOTE
Lab Results   Component Value Date    LDLCALC 63 10/26/2022   Seems to be controlled with the low-fat low-cholesterol diet and exercise

## 2022-11-10 NOTE — ASSESSMENT & PLAN NOTE
Coughing with yellowish sputum denies any fever chills no difficulty breathing a suspected pneumonia so will treat with Ceftin 500 b i d   For 7 days along with chest x-ray and cough medicine with codeine with Medrol Dosepak

## 2022-11-10 NOTE — ASSESSMENT & PLAN NOTE
A left knee replaced awaiting right knee replacement was taking Naprosyn now prescribed diclofenac gel 1% 4 times a day no change

## 2022-11-17 DIAGNOSIS — Z20.828 EXPOSURE TO INFLUENZA: Primary | ICD-10-CM

## 2022-11-17 RX ORDER — OSELTAMIVIR PHOSPHATE 75 MG/1
75 CAPSULE ORAL DAILY
Qty: 10 CAPSULE | Refills: 0 | Status: SHIPPED | OUTPATIENT
Start: 2022-11-17 | End: 2022-11-27

## 2022-11-22 ENCOUNTER — TELEPHONE (OUTPATIENT)
Dept: OBGYN CLINIC | Facility: CLINIC | Age: 57
End: 2022-11-22

## 2022-11-22 NOTE — TELEPHONE ENCOUNTER
Called pt to update p/o appt due to surgery date being moved  Also asked pt if he'd be willing to continue with having surgery at a [facility] as his secondary insurance ([place] assistance) is not accepted in [place]  Pt stated he does not have [place] assistance and does not know where that came from  He wants to proceed with his primary insurance that he has through his employer  All other questions answered to pt's satisfaction

## 2022-11-23 ENCOUNTER — HOSPITAL ENCOUNTER (OUTPATIENT)
Facility: AMBULARY SURGERY CENTER | Age: 57
Setting detail: OUTPATIENT SURGERY
Discharge: HOME | End: 2022-11-23
Attending: STUDENT IN AN ORGANIZED HEALTH CARE EDUCATION/TRAINING PROGRAM | Admitting: STUDENT IN AN ORGANIZED HEALTH CARE EDUCATION/TRAINING PROGRAM

## 2022-11-23 VITALS
RESPIRATION RATE: 18 BRPM | DIASTOLIC BLOOD PRESSURE: 105 MMHG | OXYGEN SATURATION: 97 % | HEART RATE: 68 BPM | SYSTOLIC BLOOD PRESSURE: 166 MMHG

## 2022-11-23 DIAGNOSIS — M25.742 OSTEOPHYTE OF LEFT HAND: Primary | ICD-10-CM

## 2022-11-23 RX ORDER — CHLORHEXIDINE GLUCONATE 0.12 MG/ML
15 RINSE ORAL ONCE
Status: DISCONTINUED | OUTPATIENT
Start: 2022-11-23 | End: 2022-11-23 | Stop reason: HOSPADM

## 2022-11-23 RX ORDER — HYDROCODONE BITARTRATE AND ACETAMINOPHEN 5; 325 MG/1; MG/1
1 TABLET ORAL EVERY 6 HOURS PRN
Qty: 10 TABLET | Refills: 0 | Status: SHIPPED | OUTPATIENT
Start: 2022-11-23 | End: 2022-11-30

## 2022-11-23 NOTE — OP NOTE
OPERATIVE REPORT  PATIENT NAME: Lauro Pike    :  1965  MRN: [id number]  Pt Location: [facility] OR [facility] 04    SURGERY DATE: 2022    Surgeon(s) and Role:     * Femi Simmons MD - Primary     * Penelope Patterson PA-C - Assisting    Physician Assistant need: physician assistant was needed to assist with dissection, retraction, and closure  No qualified resident was available  Preop Diagnosis:  Symptomatic osteophyte of distal interphalangeal joint of left index finger    Post-Op Diagnosis Codes:  Symptomatic osteophyte of distal interphalangeal joint of left index finger    Procedure(s) (LRB):  Left index finger distal interphalangeal joint (dorsal base of distal phalanx) osteophyte excision     Specimen(s):  * No specimens in log *    Estimated Blood Loss:   Minimal    Drains:  * No LDAs found *    Anesthesia Type:   Local    Operative Indications:  Patient is a 27-year-old male who presented with left index finger DIP joint mass  Radiographs and clinical examination was consistent with osteophyte of the DIP joint at the level of the base of distal phalanx  Discussed options including observation and surgical excision  We discussed risks of the surgery including nail bed deformity, recurrence of osteophyte, stiffness  Discussed the only permanent solution to prevent osteophyte recurrence would be DIP fusion  Operative Findings:  Left index finger dorsal base of distal phalanx osteophyte was excised with a rongeur  Osteophyte measured 4 mm long by 4 mm wide by 3 mm high  No prominence was noted after resection    Complications:   None    Procedure and Technique:  Patient was identified in preoperative holding area  Surgical site was marked patient participation  10 cc injection a 50-50 mixture 1% lidocaine 0 5% Marcaine epinephrine was used for digital block  Extremity was draped in typical fashion  Formal time-out was performed the site, patient, procedure    All present were in agreement  Tourniquet was inflated  An H-type incision was made over the DIP joint  Sharp dissection was taken to the level of the osteophyte  Terminal extensor tendon was incised longitudinally over the osteophyte and was noted to be attenuated over osteophyte  The osteophyte was removed with a rongeur  The osteophyte measured 4 mm long by 4 mm wide by 3 mm height  The sharp edges of the DIP joint were smoothed with rongeur and attempts to prevent recurrence  Active finger extension was examined and there was no extensor lag  Wound was irrigated  Longitudinal split in the extensor tendon was closed with the figure-eight stitch using 4-0 Vicryl  Skin was closed with 4-0 chromic suture in a horizontal mattress fashion  Wound was dressed with Xeroform, 4 x 4, Irasema wrap, finger soft  A Alumafoam splint was applied to the D IP joint of the index finger to mobilize the DIP joint and help prevent extensor lag  Tourniquet was deflated  Patient was taken back in stable condition     I was present for the entire procedure    Patient Disposition:  PACU         SIGNATURE: Jonathan Chisholm MD  DATE: November 23, 2022  TIME: 8:02 AM

## 2022-11-23 NOTE — DISCHARGE INSTRUCTIONS
Post Operative Instructions    You have had surgery on your arm today, please read and follow the information below:  Elevate your hand above your elbow during the next 24-48 hours to help with swelling  Place your hand and arm over your head with motion at your shoulder three times a day  Do not apply any cream/ointment/oil to your incisions including antibiotics (I e Neosporin)  Do not use peroxide to clean your wound   Do not soak your hands in standing water (dishwater, tubs, Jacuzzi's, pools, etc ) until given permission (typically 2-3 weeks after injury)    Call the office if you notice any:  Increased numbness or tingling of your hand or fingers that is not relieved with elevation  Increasing pain that is not controlled with medication  Difficulty chewing, breathing, swallowing  Chest pains or shortness of breath  Fever over 101 4 degrees  Bandage: Do NOT remove bandage until follow-up appointment  Motion: Move fingers into a fist 5 times a day, DO NOT move any splinted fingers  Weight bearing status: Avoid heavy lifting (>5 pounds) with the extremity that was operated on until follow up appointment  Normal activities of daily living are OK  Ice: Ice for 10 minutes every hour as needed for swelling x 24 hours  Sling: No sling necessary  Pain medication:   Naproxen 220 mg two times a day   Tylenol Extended Release 650 mg every 8 hours  Norco/Hydrocodone one tab every 6 hours AS NEEDED for pain     Follow-up Appointment: 7-10 days  Please call the office if you have any questions or concerns regarding your post-operative care

## 2022-11-30 ENCOUNTER — TELEPHONE (OUTPATIENT)
Dept: GASTROENTEROLOGY | Facility: CLINIC | Age: 57
End: 2022-11-30

## 2022-11-30 NOTE — TELEPHONE ENCOUNTER
Spoke to pt confirming pt's colonoscopy scheduled on 12/7/22 at [facility] with Dr Payal De Anda   Informed [facility] would be calling the day prior with the arrival time  Informed of clear liquid diet day prior as well as the bowel cleansing preparation  Informed would need a  the day of the procedure due to being under sedation  Pt believes he has instructions, however, if has any questions, will call  Advised pt to contact insurance if has any questions regarding coverage for procedure

## 2022-12-06 ENCOUNTER — OFFICE VISIT (OUTPATIENT)
Dept: OBGYN CLINIC | Facility: CLINIC | Age: 57
End: 2022-12-06

## 2022-12-06 VITALS
OXYGEN SATURATION: 98 % | BODY MASS INDEX: 26.57 KG/M2 | HEIGHT: 77 IN | SYSTOLIC BLOOD PRESSURE: 145 MMHG | WEIGHT: 225 LBS | DIASTOLIC BLOOD PRESSURE: 114 MMHG | HEART RATE: 77 BPM

## 2022-12-06 DIAGNOSIS — K52.9 CHRONIC DIARRHEA: Primary | ICD-10-CM

## 2022-12-06 DIAGNOSIS — M25.742 OSTEOPHYTE OF LEFT HAND: Primary | ICD-10-CM

## 2022-12-06 RX ORDER — CEPHALEXIN 500 MG/1
500 CAPSULE ORAL EVERY 6 HOURS SCHEDULED
Qty: 28 CAPSULE | Refills: 0 | Status: SHIPPED | OUTPATIENT
Start: 2022-12-06 | End: 2022-12-11

## 2022-12-06 RX ORDER — SODIUM CHLORIDE, SODIUM LACTATE, POTASSIUM CHLORIDE, CALCIUM CHLORIDE 600; 310; 30; 20 MG/100ML; MG/100ML; MG/100ML; MG/100ML
75 INJECTION, SOLUTION INTRAVENOUS CONTINUOUS
Status: CANCELLED | OUTPATIENT
Start: 2022-12-06

## 2022-12-06 NOTE — PROGRESS NOTES
Assessment/Plan:  Patient ID: Mary Apley 65 y o  male   Surgery: EXCISION GANGLION CYST - left index finger osteophyte excision - Left  Date of Surgery: 11/23/2022    We discussed that at this time, no obvious signs of infection  However, with the small amount of drainage seen, will place him on a 5 day course of abx just to be safe  Pt now can wash with soap/water, pat dry  No soaking  Avoid dirty environments  Cover while at work/out, but leave open to air when resting at home  Follow Up:  1 week    To Do Next Visit:  Wound check      CHIEF COMPLAINT:  Chief Complaint   Patient presents with   • Left Hand - Post-op     Left index finger         SUBJECTIVE:  Mary Apley is a 65y o  year old male who presents for follow up after EXCISION GANGLION CYST - left index finger osteophyte excision - Left  Today patient states overall the finger is doing well  States just some mild soreness  Pt states he has kept his bandage on and tried his best to keep it dry, but at times it did get a little wet  Pt has cut down to 5-6 cigarettes a week  PHYSICAL EXAMINATION:  General: well developed and well nourished, alert, oriented times 3 and appears comfortable  Psychiatric: Normal    MUSCULOSKELETAL EXAMINATION:  Incision: It appears that at some point patient had some superficial dehiscence to the wound and that it is now healing by secondary intent  He has good granulation tissue and no openings of the incision  Some residual suture tails in place  Surgery Site: No erythema, mild edema  Pt noted to have some drainage along the radial most residual suture  This seems more like granulation tissue/reaction to suture    Range of Motion: As expected and full composite fist possible  Neurovascular status: Neuro intact, good cap refill         STUDIES REVIEWED:  No studies to review       PROCEDURES PERFORMED:  Procedures  No Procedures performed today    Scribe Attestation    I,:  Shraddha Narayan PA-C am acting as a scribe while in the presence of the attending physician :       I,:  Yennifer Paredes MD personally performed the services described in this documentation    as scribed in my presence :

## 2022-12-07 ENCOUNTER — ANESTHESIA (OUTPATIENT)
Dept: GASTROENTEROLOGY | Facility: AMBULARY SURGERY CENTER | Age: 57
End: 2022-12-07

## 2022-12-07 ENCOUNTER — ANESTHESIA EVENT (OUTPATIENT)
Dept: GASTROENTEROLOGY | Facility: AMBULARY SURGERY CENTER | Age: 57
End: 2022-12-07

## 2022-12-07 ENCOUNTER — HOSPITAL ENCOUNTER (OUTPATIENT)
Dept: GASTROENTEROLOGY | Facility: AMBULARY SURGERY CENTER | Age: 57
Setting detail: OUTPATIENT SURGERY
Discharge: HOME | End: 2022-12-07
Attending: INTERNAL MEDICINE

## 2022-12-07 VITALS
DIASTOLIC BLOOD PRESSURE: 79 MMHG | SYSTOLIC BLOOD PRESSURE: 140 MMHG | HEART RATE: 75 BPM | OXYGEN SATURATION: 97 % | HEIGHT: 77 IN | TEMPERATURE: 97.4 F | RESPIRATION RATE: 16 BRPM | BODY MASS INDEX: 26.57 KG/M2 | WEIGHT: 225 LBS

## 2022-12-07 DIAGNOSIS — Z86.010 HISTORY OF COLON POLYPS: ICD-10-CM

## 2022-12-07 DIAGNOSIS — Z12.11 SCREENING FOR COLON CANCER: ICD-10-CM

## 2022-12-07 PROBLEM — IMO0001 SMOKING: Status: ACTIVE | Noted: 2017-11-13

## 2022-12-07 RX ORDER — PROPOFOL 10 MG/ML
INJECTION, EMULSION INTRAVENOUS AS NEEDED
Status: DISCONTINUED | OUTPATIENT
Start: 2022-12-07 | End: 2022-12-07

## 2022-12-07 RX ORDER — PROPOFOL 10 MG/ML
INJECTION, EMULSION INTRAVENOUS CONTINUOUS PRN
Status: DISCONTINUED | OUTPATIENT
Start: 2022-12-07 | End: 2022-12-07

## 2022-12-07 RX ORDER — SODIUM CHLORIDE, SODIUM LACTATE, POTASSIUM CHLORIDE, CALCIUM CHLORIDE 600; 310; 30; 20 MG/100ML; MG/100ML; MG/100ML; MG/100ML
75 INJECTION, SOLUTION INTRAVENOUS CONTINUOUS
Status: DISCONTINUED | OUTPATIENT
Start: 2022-12-07 | End: 2022-12-11 | Stop reason: HOSPADM

## 2022-12-07 RX ORDER — LIDOCAINE HYDROCHLORIDE 10 MG/ML
INJECTION, SOLUTION EPIDURAL; INFILTRATION; INTRACAUDAL; PERINEURAL AS NEEDED
Status: DISCONTINUED | OUTPATIENT
Start: 2022-12-07 | End: 2022-12-07

## 2022-12-07 RX ORDER — SODIUM CHLORIDE, SODIUM LACTATE, POTASSIUM CHLORIDE, CALCIUM CHLORIDE 600; 310; 30; 20 MG/100ML; MG/100ML; MG/100ML; MG/100ML
INJECTION, SOLUTION INTRAVENOUS CONTINUOUS PRN
Status: DISCONTINUED | OUTPATIENT
Start: 2022-12-07 | End: 2022-12-07

## 2022-12-07 RX ADMIN — LIDOCAINE HYDROCHLORIDE 50 MG: 10 INJECTION, SOLUTION EPIDURAL; INFILTRATION; INTRACAUDAL; PERINEURAL at 08:55

## 2022-12-07 RX ADMIN — PROPOFOL 130 MCG/KG/MIN: 10 INJECTION, EMULSION INTRAVENOUS at 08:55

## 2022-12-07 RX ADMIN — PROPOFOL 100 MG: 10 INJECTION, EMULSION INTRAVENOUS at 08:55

## 2022-12-07 RX ADMIN — SODIUM CHLORIDE, SODIUM LACTATE, POTASSIUM CHLORIDE, AND CALCIUM CHLORIDE: .6; .31; .03; .02 INJECTION, SOLUTION INTRAVENOUS at 08:43

## 2022-12-07 RX ADMIN — SODIUM CHLORIDE, SODIUM LACTATE, POTASSIUM CHLORIDE, AND CALCIUM CHLORIDE 75 ML/HR: .6; .31; .03; .02 INJECTION, SOLUTION INTRAVENOUS at 08:17

## 2022-12-07 NOTE — ANESTHESIA POSTPROCEDURE EVALUATION
Post-Op Assessment Note    CV Status:  Stable  Pain Score: 0    Pain management: adequate     Mental Status:  Alert and awake   Hydration Status:  Euvolemic   PONV Controlled:  Controlled   Airway Patency:  Patent      Post Op Vitals Reviewed: Yes      Staff: Anesthesiologist, CRNA         No notable events documented      /67 (12/07/22 0914)    Temp     Pulse 78 (12/07/22 0914)   Resp 12 (12/07/22 0914)    SpO2 99 % (12/07/22 0914)

## 2022-12-07 NOTE — H&P
History and Physical - [facility]  Colletta Robson 65 y o  male MRN: [id number]                  HPI: Colletta Robson is a 65y o  year old male who presents for history of polyp      REVIEW OF SYSTEMS: Per the HPI, and otherwise unremarkable  Historical Information   Past Medical History:   Diagnosis Date   • Arthritis    • Contact lens/glasses fitting    • Disease of thyroid gland     hypo   • Dry skin     hands/feet   • GERD (gastroesophageal reflux disease)    • Vitamin D deficiency      Past Surgical History:   Procedure Laterality Date   • COLONOSCOPY N/A 2017    Procedure: COLONOSCOPY;  Surgeon: Arnaldo Epperson MD;  Location: [facility] GI LAB; Service: Gastroenterology   • ESOPHAGOGASTRODUODENOSCOPY N/A 2017    Procedure: ESOPHAGOGASTRODUODENOSCOPY (EGD); Surgeon: Arnaldo Epperson MD;  Location: [facility] GI LAB;   Service: Gastroenterology   • MI EXCIS TENDON SHEATH LESION, HAND/FINGER Left 2022    Procedure: EXCISION GANGLION CYST - left index finger osteophyte excision;  Surgeon: Harlie Heimlich, MD;  Location: [facility] MAIN OR;  Service: Orthopedics     Social History   Social History     Substance and Sexual Activity   Alcohol Use Not Currently    Comment: socially     Social History     Substance and Sexual Activity   Drug Use No     Social History     Tobacco Use   Smoking Status Former   • Packs/day: 0 50   • Years: 20 00   • Pack years: 10 00   • Types: Cigarettes   • Quit date: 10/30/2022   • Years since quittin 1   Smokeless Tobacco Never     Family History   Problem Relation Age of Onset   • Cancer Mother         stomach   • Hypertension Mother    • Liver cancer Mother    • Cancer Father         colon   • Liver cancer Father    • Hypertension Sister    • No Known Problems Brother    • No Known Problems Daughter    • Leukemia Son    • No Known Problems Brother    • No Known Problems Daughter    • No Known Problems Son        Meds/Allergies       Current Outpatient Medications:   •  cephalexin (KEFLEX) 500 mg capsule  •  levothyroxine (Synthroid) 75 mcg tablet  •  polyethylene glycol (GOLYTELY) 4000 mL solution  •  ergocalciferol (VITAMIN D2) 50,000 units    Current Facility-Administered Medications:   •  lactated ringers infusion, 75 mL/hr, Intravenous, Continuous, 75 mL/hr at 12/07/22 9953    Facility-Administered Medications Ordered in Other Encounters:   •  lactated ringers infusion, , Intravenous, Continuous PRN, New Bag at 12/07/22 0843    No Known Allergies    Objective     /85   Pulse 68   Temp (!) 97 4 °F (36 3 °C) (Temporal)   Resp 18   Ht 6' 5" (1 956 m)   Wt 102 kg (225 lb)   SpO2 97%   BMI 26 68 kg/m²       PHYSICAL EXAM    Gen: NAD  Head: NCAT  CV: RRR  CHEST: Clear  ABD: soft, NT/ND  EXT: no edema      ASSESSMENT/PLAN:  This is a 65y o  year old male here for colonoscopy, and he is stable and optimized for his procedure

## 2022-12-07 NOTE — ANESTHESIA PREPROCEDURE EVALUATION
Procedure:  COLONOSCOPY    Relevant Problems   CARDIO   (+) Mixed hyperlipidemia   (+) Primary hypertension      ENDO   (+) Hypothyroidism      MUSCULOSKELETAL   (+) Osteoarthritis of both knees      PULMONARY   (+) Smoking        Physical Exam    Airway    Mallampati score: II  TM Distance: >3 FB  Neck ROM: full     Dental   upper dentures and lower dentures,     Cardiovascular  Rhythm: regular, Rate: normal,     Pulmonary  Breath sounds clear to auscultation,     Other Findings        Anesthesia Plan  ASA Score- 2     Anesthesia Type- IV sedation with anesthesia with ASA Monitors  Additional Monitors:   Airway Plan:           Plan Factors-    Chart reviewed  Patient is not a current smoker  Induction- intravenous  Postoperative Plan-     Informed Consent- Anesthetic plan and risks discussed with patient  I personally reviewed this patient with the CRNA  Discussed and agreed on the Anesthesia Plan with the CRNA  Scout Jaime

## 2022-12-19 PROBLEM — J20.9 ACUTE INFECTIVE TRACHEOBRONCHITIS: Status: RESOLVED | Noted: 2022-10-20 | Resolved: 2022-12-19

## 2022-12-19 PROBLEM — R05.1 ACUTE COUGH: Status: RESOLVED | Noted: 2022-10-20 | Resolved: 2022-12-19

## 2023-01-23 ENCOUNTER — HOSPITAL ENCOUNTER (OUTPATIENT)
Dept: RADIOLOGY | Facility: HOSPITAL | Age: 58
Discharge: HOME | End: 2023-01-23

## 2023-01-23 ENCOUNTER — OFFICE VISIT (OUTPATIENT)
Dept: INTERNAL MEDICINE CLINIC | Facility: CLINIC | Age: 58
End: 2023-01-23

## 2023-01-23 VITALS
TEMPERATURE: 98 F | OXYGEN SATURATION: 95 % | BODY MASS INDEX: 27.32 KG/M2 | DIASTOLIC BLOOD PRESSURE: 90 MMHG | HEART RATE: 77 BPM | HEIGHT: 77 IN | WEIGHT: 231.4 LBS | SYSTOLIC BLOOD PRESSURE: 140 MMHG | RESPIRATION RATE: 16 BRPM

## 2023-01-23 DIAGNOSIS — I10 PRIMARY HYPERTENSION: ICD-10-CM

## 2023-01-23 DIAGNOSIS — E03.9 HYPOTHYROIDISM, UNSPECIFIED TYPE: ICD-10-CM

## 2023-01-23 DIAGNOSIS — J45.991 COUGH VARIANT ASTHMA: Primary | ICD-10-CM

## 2023-01-23 DIAGNOSIS — R05.3 CHRONIC COUGH: ICD-10-CM

## 2023-01-23 DIAGNOSIS — R73.03 PREDIABETES: ICD-10-CM

## 2023-01-23 DIAGNOSIS — J45.991 COUGH VARIANT ASTHMA: ICD-10-CM

## 2023-01-23 RX ORDER — MONTELUKAST SODIUM 10 MG/1
10 TABLET ORAL
Qty: 30 TABLET | Refills: 2 | Status: SHIPPED | OUTPATIENT
Start: 2023-01-23

## 2023-01-23 RX ORDER — LEVOTHYROXINE SODIUM 0.07 MG/1
75 TABLET ORAL
Qty: 90 TABLET | Refills: 1 | Status: SHIPPED | OUTPATIENT
Start: 2023-01-23

## 2023-01-23 RX ORDER — MELOXICAM 15 MG/1
15 TABLET ORAL DAILY
COMMUNITY

## 2023-01-23 RX ORDER — METHYLPREDNISOLONE 4 MG/1
TABLET ORAL
Qty: 21 EACH | Refills: 0 | Status: SHIPPED | OUTPATIENT
Start: 2023-01-23

## 2023-01-23 RX ORDER — FLUTICASONE PROPIONATE 220 UG/1
2 AEROSOL, METERED RESPIRATORY (INHALATION) 2 TIMES DAILY
Qty: 12 G | Refills: 2 | Status: SHIPPED | OUTPATIENT
Start: 2023-01-23

## 2023-01-23 NOTE — PATIENT INSTRUCTIONS
Acute Cough   WHAT YOU NEED TO KNOW:   An acute cough can last up to 3 weeks  Common causes of an acute cough include a cold, allergies, or a lung infection  DISCHARGE INSTRUCTIONS:   Return to the emergency department if:   You have trouble breathing or feel short of breath  You cough up blood, or you see blood in your mucus  You faint or feel weak or dizzy  You have chest pain when you cough or take a deep breath  You have new wheezing  Contact your healthcare provider if:   You have a fever  Your cough lasts longer than 4 weeks  Your symptoms do not improve with treatment  You have questions or concerns about your condition or care  Medicines:   Medicines  may be needed to stop the cough, decrease swelling in your airways, or help open your airways  Medicine may also be given to help you cough up mucus  Ask your healthcare provider what over-the-counter medicines you can take  If you have an infection caused by bacteria, you may need antibiotics  Take your medicine as directed  Contact your healthcare provider if you think your medicine is not helping or if you have side effects  Tell him or her if you are allergic to any medicine  Keep a list of the medicines, vitamins, and herbs you take  Include the amounts, and when and why you take them  Bring the list or the pill bottles to follow-up visits  Carry your medicine list with you in case of an emergency  Manage your symptoms:   Do not smoke and stay away from others who smoke  Nicotine and other chemicals in cigarettes and cigars can cause lung damage and make your cough worse  Ask your healthcare provider for information if you currently smoke and need help to quit  E-cigarettes or smokeless tobacco still contain nicotine  Talk to your healthcare provider before you use these products  Drink extra liquids as directed  Liquids will help thin and loosen mucus so you can cough it up   Liquids will also help prevent dehydration  Examples of good liquids to drink include water, fruit juice, and broth  Do not drink liquids that contain caffeine  Caffeine can increase your risk for dehydration  Ask your healthcare provider how much liquid to drink each day  Rest as directed  Do not do activities that make your cough worse, such as exercise  Use a humidifier or vaporizer  Use a cool mist humidifier or a vaporizer to increase air moisture in your home  This may make it easier for you to breathe and help decrease your cough  Eat 2 to 5 mL of honey 2 times each day  Honey can help thin mucus and decrease your cough  Use cough drops or lozenges  These can help decrease throat irritation and your cough  Follow up with your healthcare provider as directed:  Write down your questions so you remember to ask them during your visits  © Copyright [employer] 2022 Information is for End User's use only and may not be sold, redistributed or otherwise used for commercial purposes  All illustrations and images included in CareNotes® are the copyrighted property of A [employer] A M [employer]  or [address] [place], [place] [address]rickey Armstrong   The above information is an  only  It is not intended as medical advice for individual conditions or treatments  Talk to your doctor, nurse or pharmacist before following any medical regimen to see if it is safe and effective for you

## 2023-01-23 NOTE — ASSESSMENT & PLAN NOTE
Patient stopped taking Synthroid 2 weeks ago patient claims when out of the medicine counseling done at length of the compliance with the medicine to continue explained the risk for stopping the replacement with Synthroid

## 2023-01-23 NOTE — ASSESSMENT & PLAN NOTE
Start with Medrol Dosepak continue with the Flovent inhaler 2 puffs twice a day along with singular and if no improvement pulmonary consult

## 2023-01-23 NOTE — PROGRESS NOTES
Dr Daria Bennett Office Visit Note  23     Kimberlee Neely 65 y o  male MRN: [id number]  : 1965    Assessment:     1  Chronic cough  Assessment & Plan:  Persistent coughing especially evening at nighttime no expectoration no fever chills no difficulty breathing will get chest x-ray the course of prednisone with as Flovent and if no improvement pulmonary consult    Orders:  -     methylPREDNISolone 4 MG tablet therapy pack; Use as directed on package  -     montelukast (SINGULAIR) 10 mg tablet; Take 1 tablet (10 mg total) by mouth daily at bedtime  -     fluticasone (Flovent HFA) 220 mcg/act inhaler; Inhale 2 puffs 2 (two) times a day Rinse mouth after use  -     XR chest pa & lateral; Future; Expected date: 2023    2  Hypothyroidism, unspecified type  Assessment & Plan:  Patient stopped taking Synthroid 2 weeks ago patient claims when out of the medicine counseling done at length of the compliance with the medicine to continue explained the risk for stopping the replacement with Synthroid    Orders:  -     levothyroxine (Synthroid) 75 mcg tablet; Take 1 tablet (75 mcg total) by mouth daily in the early morning    3  Cough variant asthma  Assessment & Plan:  Start with Medrol Dosepak continue with the Flovent inhaler 2 puffs twice a day along with singular and if no improvement pulmonary consult    Orders:  -     methylPREDNISolone 4 MG tablet therapy pack; Use as directed on package  -     montelukast (SINGULAIR) 10 mg tablet; Take 1 tablet (10 mg total) by mouth daily at bedtime  -     fluticasone (Flovent HFA) 220 mcg/act inhaler; Inhale 2 puffs 2 (two) times a day Rinse mouth after use  -     XR chest pa & lateral; Future; Expected date: 2023    4  Primary hypertension  Assessment & Plan:  Blood pressure controlled continue low-salt diet monitoring      5  Prediabetes  Assessment & Plan:  Continue diabetic diet            Discussion Summary and Plan:   Today's care plan and medications were reviewed with patient in detail and all their questions answered to their satisfaction  Chief Complaint   Patient presents with   • Cough     Has had a cough since last time he was here  Subjective:  Patient came in for follow-up persistent dry coughing especially evening and at nighttime was treated symptomatically about more than a month ago no improvement and also stopped taking Synthroid about 2 weeks ago patient claims when out of the medicine and patient thought that does not need the medicine so was counseling was done for the risk of not taking Synthroid and was treated for a new problem persistent dry cough with Medrol Dosepak Flovent and a chest x-ray      The following portions of the patient's history were reviewed and updated as appropriate: allergies, current medications, past family history, past medical history, past social history, past surgical history and problem list     Review of Systems   Constitutional: Positive for fatigue  Negative for activity change, appetite change, chills, diaphoresis, fever and unexpected weight change  HENT: Negative for congestion, dental problem, drooling, ear discharge, ear pain, facial swelling, hearing loss, mouth sores, nosebleeds, postnasal drip, rhinorrhea, sinus pressure, sneezing, sore throat, tinnitus, trouble swallowing and voice change  Eyes: Negative for photophobia, pain, discharge, redness, itching and visual disturbance  Respiratory: Positive for cough  Negative for apnea, choking, chest tightness, shortness of breath, wheezing and stridor  Cardiovascular: Negative for chest pain, palpitations and leg swelling  Gastrointestinal: Negative for abdominal distention, abdominal pain, anal bleeding, blood in stool, constipation, diarrhea, nausea, rectal pain and vomiting  Endocrine: Negative for cold intolerance, heat intolerance, polydipsia, polyphagia and polyuria     Genitourinary: Negative for decreased urine volume, difficulty urinating, dysuria, enuresis, flank pain, frequency, genital sores, hematuria and urgency  Musculoskeletal: Negative for arthralgias, back pain, gait problem, joint swelling, myalgias, neck pain and neck stiffness  Skin: Negative for color change, pallor, rash and wound  Allergic/Immunologic: Negative  Negative for environmental allergies, food allergies and immunocompromised state  Neurological: Negative for dizziness, tremors, seizures, syncope, facial asymmetry, speech difficulty, weakness, light-headedness, numbness and headaches  Psychiatric/Behavioral: Negative for agitation, behavioral problems, confusion, decreased concentration, dysphoric mood, hallucinations, self-injury, sleep disturbance and suicidal ideas  The patient is not nervous/anxious and is not hyperactive  Historical Information   Patient Active Problem List   Diagnosis   • Acquired hallux valgus of both feet   • Onychomycosis   • Chronic pain of left knee   • Generalized pruritus   • Chronic diarrhea   • Prediabetes   • BMI 26 0-26 9,adult   • Elevated blood pressure reading   • Smoking   • Intrinsic eczema   • Osteoarthritis of both knees   • Primary hypertension   • Mixed hyperlipidemia   • Hypothyroidism   • Osteophyte of left hand   • Cough variant asthma   • Chronic cough     Past Medical History:   Diagnosis Date   • Arthritis    • Contact lens/glasses fitting    • Disease of thyroid gland     hypo   • Dry skin     hands/feet   • GERD (gastroesophageal reflux disease)    • Vitamin D deficiency      Past Surgical History:   Procedure Laterality Date   • COLONOSCOPY N/A 12/22/2017    Procedure: COLONOSCOPY;  Surgeon: Micah Vasquez MD;  Location: [place] [address] GI LAB; Service: Gastroenterology   • ESOPHAGOGASTRODUODENOSCOPY N/A 12/22/2017    Procedure: ESOPHAGOGASTRODUODENOSCOPY (EGD); Surgeon: Micah Vasquez MD;  Location: [facility] GI LAB;   Service: Gastroenterology   • IL EXC LESION TDN SHTH/JT CAPSL HAND/FNGR Left 2022    Procedure: EXCISION GANGLION CYST - left index finger osteophyte excision;  Surgeon: Brittny Miranda MD;  [facility]IN [facility];  Service: Orthopedics     Social History     Substance and Sexual Activity   Alcohol Use Not Currently    Comment: socially     Social History     Substance and Sexual Activity   Drug Use No     Social History     Tobacco Use   Smoking Status Every Day   • Packs/day: 0 50   • Years: 20 00   • Pack years: 10 00   • Types: Cigarettes   • Last attempt to quit: 10/30/2022   • Years since quittin 2   Smokeless Tobacco Never     Family History   Problem Relation Age of Onset   • Cancer Mother         stomach   • Hypertension Mother    • Liver cancer Mother    • Cancer Father         colon   • Liver cancer Father    • Hypertension Sister    • No Known Problems Brother    • No Known Problems Daughter    • Leukemia Son    • No Known Problems Brother    • No Known Problems Daughter    • No Known Problems Son      Health Maintenance Due   Topic   • Hepatitis C Screening    • COVID-19 Vaccine (1)   • HIV Screening    • Annual Physical    • Pneumococcal Vaccine: Pediatrics (0 to 5 Years) and At-Risk Patients (6 to 59 Years) (2 - PCV)   • Influenza Vaccine (1)      Meds/Allergies       Current Outpatient Medications:   •  ergocalciferol (VITAMIN D2) 50,000 units, Take 1 capsule (50,000 Units total) by mouth once a week, Disp: 12 capsule, Rfl: 0  •  fluticasone (Flovent HFA) 220 mcg/act inhaler, Inhale 2 puffs 2 (two) times a day Rinse mouth after use , Disp: 12 g, Rfl: 2  •  levothyroxine (Synthroid) 75 mcg tablet, Take 1 tablet (75 mcg total) by mouth daily in the early morning, Disp: 90 tablet, Rfl: 1  •  meloxicam (MOBIC) 15 mg tablet, Take 15 mg by mouth daily, Disp: , Rfl:   •  methylPREDNISolone 4 MG tablet therapy pack, Use as directed on package, Disp: 21 each, Rfl: 0  •  montelukast (SINGULAIR) 10 mg tablet, Take 1 tablet (10 mg total) by mouth daily at bedtime, Disp: 30 tablet, Rfl: 2  •  polyethylene glycol (GOLYTELY) 4000 mL solution, Take 4,000 mL by mouth once for 1 dose, Disp: 4000 mL, Rfl: 0      Objective:    Vitals:   /90   Pulse 77   Temp 98 °F (36 7 °C)   Resp 16   Ht 6' 5" (1 956 m)   Wt 105 kg (231 lb 6 4 oz)   SpO2 95%   BMI 27 44 kg/m²   Body mass index is 27 44 kg/m²  Vitals:    01/23/23 0857   Weight: 105 kg (231 lb 6 4 oz)       Physical Exam  Constitutional:       General: He is not in acute distress  Appearance: He is well-developed  He is not ill-appearing, toxic-appearing or diaphoretic  HENT:      Head: Normocephalic and atraumatic  Right Ear: External ear normal       Left Ear: External ear normal       Nose: Nose normal       Mouth/Throat:      Pharynx: No oropharyngeal exudate  Eyes:      General: Lids are normal  Lids are everted, no foreign bodies appreciated  No scleral icterus  Right eye: No discharge  Left eye: No discharge  Conjunctiva/sclera: Conjunctivae normal       Pupils: Pupils are equal, round, and reactive to light  Neck:      Thyroid: No thyromegaly  Vascular: Normal carotid pulses  No carotid bruit, hepatojugular reflux or JVD  Trachea: No tracheal tenderness or tracheal deviation  Cardiovascular:      Rate and Rhythm: Normal rate and regular rhythm  Pulses: Normal pulses  Heart sounds: Normal heart sounds  No murmur heard  No friction rub  No gallop  Pulmonary:      Effort: Pulmonary effort is normal  No respiratory distress  Breath sounds: Normal breath sounds  No stridor  No wheezing or rales  Chest:      Chest wall: No tenderness  Abdominal:      General: Bowel sounds are normal  There is no distension  Palpations: Abdomen is soft  There is no mass  Tenderness: There is no abdominal tenderness  There is no guarding or rebound  Musculoskeletal:         General: No tenderness or deformity  Normal range of motion        Cervical back: Normal range of motion and neck supple  No edema, erythema or rigidity  No spinous process tenderness or muscular tenderness  Normal range of motion  Lymphadenopathy:      Head:      Right side of head: No submental, submandibular, tonsillar, preauricular or posterior auricular adenopathy  Left side of head: No submental, submandibular, tonsillar, preauricular, posterior auricular or occipital adenopathy  Cervical: No cervical adenopathy  Right cervical: No superficial, deep or posterior cervical adenopathy  Left cervical: No superficial, deep or posterior cervical adenopathy  Upper Body:      Right upper body: No pectoral adenopathy  Left upper body: No pectoral adenopathy  Skin:     General: Skin is warm and dry  Coloration: Skin is not pale  Findings: No erythema or rash  Neurological:      Mental Status: He is alert and oriented to person, place, and time  Cranial Nerves: No cranial nerve deficit  Sensory: No sensory deficit  Motor: No tremor, abnormal muscle tone or seizure activity  Coordination: Coordination normal       Gait: Gait normal       Deep Tendon Reflexes: Reflexes are normal and symmetric  Reflexes normal    Psychiatric:         Behavior: Behavior normal          Thought Content:  Thought content normal          Judgment: Judgment normal          Lab Review   Hospital Outpatient Visit on 12/07/2022   Component Date Value Ref Range Status   • Case Report 12/07/2022    Final                    Value:Surgical Pathology Report                         Case: [id number]                                   Authorizing Provider:  René Garcia MD           Collected:           12/07/2022 0910              Ordering Location:     [facility]   Received:            12/07/2022 [address] [place] [address],[address]                                                                       Pathologist:           Roger Contreras MD Specimen:    Colon, Sigmoid polyp Bx                                                                   • Final Diagnosis 12/07/2022    Final                    Value: This result contains rich text formatting which cannot be displayed here  • Additional Information 12/07/2022    Final                    Value: This result contains rich text formatting which cannot be displayed here  • Synoptic Checklist 12/07/2022    Final                    Value:                            COLON/RECTUM POLYP FORM - GI - A                                                                                     :    Other     • Gross Description 12/07/2022    Final                    Value: This result contains rich text formatting which cannot be displayed here  Patient Instructions   Acute Cough   WHAT YOU NEED TO KNOW:   An acute cough can last up to 3 weeks  Common causes of an acute cough include a cold, allergies, or a lung infection  DISCHARGE INSTRUCTIONS:   Return to the emergency department if:   · You have trouble breathing or feel short of breath  · You cough up blood, or you see blood in your mucus  · You faint or feel weak or dizzy  · You have chest pain when you cough or take a deep breath  · You have new wheezing  Contact your healthcare provider if:   · You have a fever  · Your cough lasts longer than 4 weeks  · Your symptoms do not improve with treatment  · You have questions or concerns about your condition or care  Medicines:   · Medicines  may be needed to stop the cough, decrease swelling in your airways, or help open your airways  Medicine may also be given to help you cough up mucus  Ask your healthcare provider what over-the-counter medicines you can take  If you have an infection caused by bacteria, you may need antibiotics  · Take your medicine as directed    Contact your healthcare provider if you think your medicine is not helping or if you have side effects  Tell him or her if you are allergic to any medicine  Keep a list of the medicines, vitamins, and herbs you take  Include the amounts, and when and why you take them  Bring the list or the pill bottles to follow-up visits  Carry your medicine list with you in case of an emergency  Manage your symptoms:   · Do not smoke and stay away from others who smoke  Nicotine and other chemicals in cigarettes and cigars can cause lung damage and make your cough worse  Ask your healthcare provider for information if you currently smoke and need help to quit  E-cigarettes or smokeless tobacco still contain nicotine  Talk to your healthcare provider before you use these products  · Drink extra liquids as directed  Liquids will help thin and loosen mucus so you can cough it up  Liquids will also help prevent dehydration  Examples of good liquids to drink include water, fruit juice, and broth  Do not drink liquids that contain caffeine  Caffeine can increase your risk for dehydration  Ask your healthcare provider how much liquid to drink each day  · Rest as directed  Do not do activities that make your cough worse, such as exercise  · Use a humidifier or vaporizer  Use a cool mist humidifier or a vaporizer to increase air moisture in your home  This may make it easier for you to breathe and help decrease your cough  · Eat 2 to 5 mL of honey 2 times each day  Honey can help thin mucus and decrease your cough  · Use cough drops or lozenges  These can help decrease throat irritation and your cough  Follow up with your healthcare provider as directed:  Write down your questions so you remember to ask them during your visits  © Copyright [employer] 2022 Information is for End User's use only and may not be sold, redistributed or otherwise used for commercial purposes   All illustrations and images included in CareNotes® are the copyrighted property of [employer]  or [address]   The above information is an  only  It is not intended as medical advice for individual conditions or treatments  Talk to your doctor, nurse or pharmacist before following any medical regimen to see if it is safe and effective for you  Andrea Bell MD        "This note has been constructed using a voice recognition system  Therefore there may be syntax, spelling, and/or grammatical errors   Please call if you have any questions  "

## 2023-01-23 NOTE — ASSESSMENT & PLAN NOTE
Persistent coughing especially evening at nighttime no expectoration no fever chills no difficulty breathing will get chest x-ray the course of prednisone with as Flovent and if no improvement pulmonary consult

## 2023-02-23 ENCOUNTER — OFFICE VISIT (OUTPATIENT)
Dept: FAMILY MEDICINE CLINIC | Facility: CLINIC | Age: 58
End: 2023-02-23

## 2023-02-23 ENCOUNTER — HOSPITAL ENCOUNTER (OUTPATIENT)
Dept: RADIOLOGY | Facility: HOSPITAL | Age: 58
Discharge: HOME | End: 2023-02-23

## 2023-02-23 ENCOUNTER — APPOINTMENT (OUTPATIENT)
Dept: LAB | Facility: CLINIC | Age: 58
End: 2023-02-23

## 2023-02-23 ENCOUNTER — TELEPHONE (OUTPATIENT)
Dept: INTERNAL MEDICINE CLINIC | Facility: CLINIC | Age: 58
End: 2023-02-23

## 2023-02-23 VITALS
OXYGEN SATURATION: 97 % | BODY MASS INDEX: 27.28 KG/M2 | HEART RATE: 81 BPM | DIASTOLIC BLOOD PRESSURE: 74 MMHG | WEIGHT: 231 LBS | SYSTOLIC BLOOD PRESSURE: 124 MMHG | HEIGHT: 77 IN

## 2023-02-23 DIAGNOSIS — R39.15 URGENCY OF URINATION: ICD-10-CM

## 2023-02-23 DIAGNOSIS — S69.91XA INJURY OF RIGHT HAND, INITIAL ENCOUNTER: ICD-10-CM

## 2023-02-23 DIAGNOSIS — J45.991 COUGH VARIANT ASTHMA: ICD-10-CM

## 2023-02-23 DIAGNOSIS — M17.0 PRIMARY OSTEOARTHRITIS OF BOTH KNEES: ICD-10-CM

## 2023-02-23 DIAGNOSIS — R39.15 URGENCY OF URINATION: Primary | ICD-10-CM

## 2023-02-23 DIAGNOSIS — E03.9 HYPOTHYROIDISM, UNSPECIFIED TYPE: ICD-10-CM

## 2023-02-23 LAB
BACTERIA UR QL AUTO: ABNORMAL /HPF
BILIRUB UR QL STRIP: NEGATIVE
CLARITY UR: CLEAR
COLOR UR: YELLOW
GLUCOSE UR STRIP-MCNC: NEGATIVE MG/DL
HGB UR QL STRIP.AUTO: ABNORMAL
KETONES UR STRIP-MCNC: NEGATIVE MG/DL
LEUKOCYTE ESTERASE UR QL STRIP: NEGATIVE
MUCOUS THREADS UR QL AUTO: ABNORMAL
NITRITE UR QL STRIP: NEGATIVE
NON-SQ EPI CELLS URNS QL MICRO: ABNORMAL /HPF
PH UR STRIP.AUTO: 6.5 [PH]
PROT UR STRIP-MCNC: ABNORMAL MG/DL
RBC #/AREA URNS AUTO: ABNORMAL /HPF
SP GR UR STRIP.AUTO: 1.02 (ref 1–1.03)
UROBILINOGEN UR STRIP-ACNC: <2 MG/DL
WBC #/AREA URNS AUTO: ABNORMAL /HPF

## 2023-02-23 RX ORDER — TAMSULOSIN HYDROCHLORIDE 0.4 MG/1
0.4 CAPSULE ORAL
Qty: 30 CAPSULE | Refills: 0 | Status: SHIPPED | OUTPATIENT
Start: 2023-02-23

## 2023-02-23 NOTE — ASSESSMENT & PLAN NOTE
Patient with increased frequency urgency with urination for past few weeks no burning sensation rectal exam question mild enlargement  We will get urinalysis and culture start him on Flomax 0 4 mg side effects explained    We will also get ultrasound of the kidney and bladder

## 2023-02-23 NOTE — ASSESSMENT & PLAN NOTE
Patient injured his right hand accidentally couple of weeks ago noted deformity of the right little finger  Exam shows flexion at the first interphalangeal joint area of the little finger no swelling no tenderness appears to have tendon damage    We will get an x-ray of the right hand and will also refer the patient to the hand surgeon [No Acute Distress] : no acute distress [Well Nourished] : well nourished [Well Developed] : well developed [Well-Appearing] : well-appearing [Normal Sclera/Conjunctiva] : normal sclera/conjunctiva [PERRL] : pupils equal round and reactive to light [EOMI] : extraocular movements intact [Normal Outer Ear/Nose] : the outer ears and nose were normal in appearance [Normal Oropharynx] : the oropharynx was normal [No JVD] : no jugular venous distention [No Lymphadenopathy] : no lymphadenopathy [Supple] : supple [Thyroid Normal, No Nodules] : the thyroid was normal and there were no nodules present [No Respiratory Distress] : no respiratory distress  [No Accessory Muscle Use] : no accessory muscle use [Clear to Auscultation] : lungs were clear to auscultation bilaterally [Normal Rate] : normal rate  [Regular Rhythm] : with a regular rhythm [Normal S1, S2] : normal S1 and S2 [No Murmur] : no murmur heard [No Carotid Bruits] : no carotid bruits [No Abdominal Bruit] : a ~M bruit was not heard ~T in the abdomen [No Varicosities] : no varicosities [Pedal Pulses Present] : the pedal pulses are present [No Edema] : there was no peripheral edema [No Palpable Aorta] : no palpable aorta [No Extremity Clubbing/Cyanosis] : no extremity clubbing/cyanosis [Soft] : abdomen soft [Non Tender] : non-tender [Non-distended] : non-distended [No Masses] : no abdominal mass palpated [No HSM] : no HSM [Normal Bowel Sounds] : normal bowel sounds [Normal Posterior Cervical Nodes] : no posterior cervical lymphadenopathy [Normal Anterior Cervical Nodes] : no anterior cervical lymphadenopathy [No CVA Tenderness] : no CVA  tenderness [No Spinal Tenderness] : no spinal tenderness [No Joint Swelling] : no joint swelling [Grossly Normal Strength/Tone] : grossly normal strength/tone [No Rash] : no rash [Coordination Grossly Intact] : coordination grossly intact [No Focal Deficits] : no focal deficits [Normal Gait] : normal gait [Deep Tendon Reflexes (DTR)] : deep tendon reflexes were 2+ and symmetric [Normal Affect] : the affect was normal [Normal Insight/Judgement] : insight and judgment were intact

## 2023-02-23 NOTE — ASSESSMENT & PLAN NOTE
Patient recently seen by the orthopedics who operated on his right knee    Patient feeling all right at this time knee movements without any discomfort

## 2023-02-23 NOTE — PROGRESS NOTES
Office Visit Note  23     Colletta Robson 65 y o  male MRN: [id number]  : 1965    Assessment:     1  Urgency of urination  Assessment & Plan:  Patient with increased frequency urgency with urination for past few weeks no burning sensation rectal exam question mild enlargement  We will get urinalysis and culture start him on Flomax 0 4 mg side effects explained  We will also get ultrasound of the kidney and bladder    Orders:  -     UA w Reflex to Microscopic w Reflex to Culture; Future; Expected date: 2023  -     tamsulosin (FLOMAX) 0 4 mg; Take 1 capsule (0 4 mg total) by mouth daily with dinner    2  Injury of right hand, initial encounter  Assessment & Plan:  Patient injured his right hand accidentally couple of weeks ago noted deformity of the right little finger  Exam shows flexion at the first interphalangeal joint area of the little finger no swelling no tenderness appears to have tendon damage  We will get an x-ray of the right hand and will also refer the patient to the hand surgeon    Orders:  -     XR hand 3+ vw right; Future; Expected date: 2023  -     Ambulatory Referral to Orthopedic Surgery; Future; Expected date: 2023    3  Hypothyroidism, unspecified type  Assessment & Plan:  Continue Synthroid 75 mcg daily follow-up with repeat lab      4  Cough variant asthma  Assessment & Plan:  Patient feeling better no cough now no shortness of breath      5  Primary osteoarthritis of both knees  Assessment & Plan:  Patient recently seen by the orthopedics who operated on his right knee  Patient feeling all right at this time knee movements without any discomfort        BMI Counseling: Body mass index is 27 39 kg/m²  The BMI is above normal  Nutrition recommendations include decreasing portion sizes, decreasing fast food intake, consuming healthier snacks, moderation in carbohydrate intake and reducing intake of cholesterol   Exercise recommendations include moderate physical activity 150 minutes/week  No pharmacotherapy was ordered  Rationale for BMI follow-up plan is due to patient being overweight or obese  Discussion Summary and Plan: Today's care plan and medications were reviewed with patient in detail and all their questions answered to their satisfaction  Chief Complaint   Patient presents with   • slight pain right pinky   • Urinary Frequency      Subjective:  Patient is coming in for evaluation regarding symptoms of increased frequency and urgency with urination for last couple of weeks  Denies any burning sensation when he is passing urine no pain in the flank area or in the lower part of the abdomen  No family history of prostate CA  Patient also complains of discomfort right little finger  He had an injury accidentally when working in the yard initially was having pain discomfort subsequently that has subsided but he noticed that it is a deformity of his right  Pain level not straightening up always in the interphalangeal joint area is flexed  The following portions of the patient's history were reviewed and updated as appropriate: allergies, current medications, past family history, past medical history, past social history, past surgical history and problem list     Review of Systems   Constitutional: Negative for chills and fever  HENT: Negative for ear pain and sore throat  Eyes: Negative for pain and visual disturbance  Respiratory: Negative for cough and shortness of breath  Cardiovascular: Negative for chest pain and palpitations  Gastrointestinal: Negative for abdominal pain and vomiting  Genitourinary: Positive for frequency and urgency  Negative for dysuria and hematuria  Musculoskeletal: Negative for arthralgias and back pain  Skin: Negative for color change and rash  Neurological: Negative for seizures and syncope  All other systems reviewed and are negative          Historical Information   Patient Active Problem List Diagnosis   • Acquired hallux valgus of both feet   • Onychomycosis   • Chronic pain of left knee   • Generalized pruritus   • Chronic diarrhea   • Prediabetes   • BMI 26 0-26 9,adult   • Elevated blood pressure reading   • Smoking   • Intrinsic eczema   • Osteoarthritis of both knees   • Primary hypertension   • Mixed hyperlipidemia   • Hypothyroidism   • Osteophyte of left hand   • Cough variant asthma   • Chronic cough   • Urgency of urination   • Injury of right hand     Past Medical History:   Diagnosis Date   • Arthritis    • Contact lens/glasses fitting    • Disease of thyroid gland     hypo   • Dry skin     hands/feet   • GERD (gastroesophageal reflux disease)    • Vitamin D deficiency      Past Surgical History:   Procedure Laterality Date   • COLONOSCOPY N/A 2017    Procedure: COLONOSCOPY;  Surgeon: Miriam Ashley MD;  Location: [facility] GI LAB; Service: Gastroenterology   • ESOPHAGOGASTRODUODENOSCOPY N/A 2017    Procedure: ESOPHAGOGASTRODUODENOSCOPY (EGD); Surgeon: Miriam Ashley MD;  Location: [facility] GI LAB;   Service: Gastroenterology   • VA EXC LESION TDN SHTH/JT CAPSL HAND/FNGR Left 2022    Procedure: EXCISION GANGLION CYST - left index finger osteophyte excision;  Surgeon: Rolando Smallwood MD;  Location: [facility] MAIN OR;  Service: Orthopedics     Social History     Substance and Sexual Activity   Alcohol Use Not Currently    Comment: socially     Social History     Substance and Sexual Activity   Drug Use No     Social History     Tobacco Use   Smoking Status Every Day   • Packs/day: 0 50   • Years: 20 00   • Pack years: 10 00   • Types: Cigarettes   • Last attempt to quit: 10/30/2022   • Years since quittin 3   Smokeless Tobacco Never     Family History   Problem Relation Age of Onset   • Cancer Mother         stomach   • Hypertension Mother    • Liver cancer Mother    • Cancer Father         colon   • Liver cancer Father    • Hypertension Sister    • No Known Problems Brother    • No Known Problems Daughter    • Leukemia Son    • No Known Problems Brother    • No Known Problems Daughter    • No Known Problems Son      Health Maintenance Due   Topic   • Hepatitis C Screening    • COVID-19 Vaccine (1)   • HIV Screening    • Annual Physical    • Pneumococcal Vaccine: Pediatrics (0 to 5 Years) and At-Risk Patients (6 to 59 Years) (2 - PCV)   • Influenza Vaccine (1)      Meds/Allergies       Current Outpatient Medications:   •  fluticasone (Flovent HFA) 220 mcg/act inhaler, Inhale 2 puffs 2 (two) times a day Rinse mouth after use , Disp: 12 g, Rfl: 2  •  levothyroxine (Synthroid) 75 mcg tablet, Take 1 tablet (75 mcg total) by mouth daily in the early morning, Disp: 90 tablet, Rfl: 1  •  meloxicam (MOBIC) 15 mg tablet, Take 15 mg by mouth daily, Disp: , Rfl:   •  tamsulosin (FLOMAX) 0 4 mg, Take 1 capsule (0 4 mg total) by mouth daily with dinner, Disp: 30 capsule, Rfl: 0      Objective:    Vitals:   /74 (BP Location: Right arm, Patient Position: Sitting, Cuff Size: Standard)   Pulse 81   Ht 6' 5" (1 956 m)   Wt 105 kg (231 lb)   SpO2 97%   BMI 27 39 kg/m²   Body mass index is 27 39 kg/m²  Vitals:    02/23/23 0830   Weight: 105 kg (231 lb)       Physical Exam  Vitals and nursing note reviewed  Constitutional:       Appearance: Normal appearance  Cardiovascular:      Rate and Rhythm: Normal rate and regular rhythm  Heart sounds: Normal heart sounds  Pulmonary:      Effort: Pulmonary effort is normal       Breath sounds: Normal breath sounds  Abdominal:      Palpations: Abdomen is soft  Genitourinary:     Comments: Question mild enlargement of prostate  Musculoskeletal:      Right lower leg: No edema  Left lower leg: No edema  Neurological:      Mental Status: He is alert and oriented to person, place, and time  Lab Review   No visits with results within 2 Month(s) from this visit     Latest known visit with results is:   Hospital Outpatient Visit on 12/07/2022   Component Date Value Ref Range Status   • Case Report 12/07/2022    Final                    Value:Surgical Pathology Report                         Case: [id number]                                   Authorizing Provider:  Zena Whitehead MD           Collected:           12/07/2022 0910              Ordering Location:     [facility]   Received:            12/07/2022 [address]                                                                       Pathologist:           Ashley Mc MD                                                           Specimen:    Colon, Sigmoid polyp Bx                                                                   • Final Diagnosis 12/07/2022    Final                    Value: This result contains rich text formatting which cannot be displayed here  • Additional Information 12/07/2022    Final                    Value: This result contains rich text formatting which cannot be displayed here  • Synoptic Checklist 12/07/2022    Final                    Value:                            COLON/RECTUM POLYP FORM - GI - A                                                                                     :    Other     • Gross Description 12/07/2022    Final                    Value: This result contains rich text formatting which cannot be displayed here  Tawana Gregory MD        "This note has been constructed using a voice recognition system  Therefore there may be syntax, spelling, and/or grammatical errors   Please call if you have any questions  "

## 2023-03-17 DIAGNOSIS — R39.15 URGENCY OF URINATION: ICD-10-CM

## 2023-03-17 RX ORDER — TAMSULOSIN HYDROCHLORIDE 0.4 MG/1
0.4 CAPSULE ORAL
Qty: 30 CAPSULE | Refills: 2 | Status: SHIPPED | OUTPATIENT
Start: 2023-03-17

## 2023-03-24 ENCOUNTER — OFFICE VISIT (OUTPATIENT)
Dept: OBGYN CLINIC | Facility: CLINIC | Age: 58
End: 2023-03-24

## 2023-03-24 VITALS
HEART RATE: 76 BPM | HEIGHT: 77 IN | BODY MASS INDEX: 27.63 KG/M2 | SYSTOLIC BLOOD PRESSURE: 160 MMHG | TEMPERATURE: 98.2 F | WEIGHT: 234 LBS | DIASTOLIC BLOOD PRESSURE: 90 MMHG

## 2023-03-24 DIAGNOSIS — S69.91XA INJURY OF RIGHT HAND, INITIAL ENCOUNTER: ICD-10-CM

## 2023-03-24 DIAGNOSIS — M20.011 MALLET DEFORMITY OF RIGHT LITTLE FINGER: ICD-10-CM

## 2023-03-24 DIAGNOSIS — S62.639A CLOSED DISPLACED FRACTURE OF DISTAL PHALANX OF FINGER OF RIGHT HAND: Primary | ICD-10-CM

## 2023-03-24 NOTE — PROGRESS NOTES
Assessment/Plan:  1  Closed displaced fracture of distal phalanx of finger of right hand  Ambulatory Referral to PT/OT Hand Therapy      2  Mallet deformity of right little finger  Ambulatory Referral to PT/OT Hand Therapy      3  Injury of right hand, initial encounter  Ambulatory Referral to Orthopedic Surgery    Ambulatory Referral to PT/OT Hand Therapy        Scribe Attestation    I,:  Jeane Newsome am acting as a scribe while in the presence of the attending physician :       I,:  Анна Ramsey MD personally performed the services described in this documentation    as scribed in my presence :         After reviewing his imaging and performing a thorough history and physical exam I explained to Stefanie Vale that he has an avulsion fracture of the distal phalanx of the little finger in his right hand (bony mallet injury)  This has resulted in a mallet deformity  We discussed treatment options which included nonoperative and operative treatment  I explained that he should do well with conservative treatment  He was placed into a stack splint today and understands to wear this all the time  Patient instructed on placement and removal of splint and to keep finger supported in extension at all times     I also provided him with a referral to PT/OT hand therapy for mallet finger casting  We will see him back in 2-4 weeks for repeat clinical evaluation  Cc: my finger is drooping    Subjective: Initial evaluation for right hand    Patient ID: Kolton Sutherland is a RHD 65 y o  male who presents today for initial evaluation of his right hand  At today's visit, he reports that he injured his hand over a month ago  A piece of wood fell onto his finger  Since the time of the injury, he has been unable to extend the DIP joint of his little finger  His pain has improved but he continues to experience persistent deformity about his little finger  He denies any recurrent injury or trauma       Review of Systems Constitutional: Positive for activity change  Negative for chills, fever and unexpected weight change  HENT: Negative for hearing loss, nosebleeds and sore throat  Eyes: Negative for pain, redness and visual disturbance  Respiratory: Negative for cough, shortness of breath and wheezing  Cardiovascular: Negative for chest pain, palpitations and leg swelling  Gastrointestinal: Negative for abdominal pain, nausea and vomiting  Endocrine: Negative for polyphagia and polyuria  Genitourinary: Negative for dysuria and hematuria  Musculoskeletal: Positive for arthralgias  Negative for joint swelling and myalgias  See HPI   Skin: Negative for rash and wound  Neurological: Negative for dizziness, numbness and headaches  Psychiatric/Behavioral: Negative for decreased concentration and suicidal ideas  The patient is not nervous/anxious  Past Medical History:   Diagnosis Date   • Arthritis    • Contact lens/glasses fitting    • Disease of thyroid gland     hypo   • Dry skin     hands/feet   • GERD (gastroesophageal reflux disease)    • Vitamin D deficiency        Past Surgical History:   Procedure Laterality Date   • COLONOSCOPY N/A 12/22/2017    Procedure: COLONOSCOPY;  Surgeon: Raya Saldana MD;  Location: [place] [address] GI LAB; Service: Gastroenterology   • ESOPHAGOGASTRODUODENOSCOPY N/A 12/22/2017    Procedure: ESOPHAGOGASTRODUODENOSCOPY (EGD); Surgeon: Raya Saldana MD;  Location: [facility] GI LAB;   Service: Gastroenterology   • OR EXC LESION TDN SHTH/JT CAPSL HAND/FNGR Left 11/23/2022    Procedure: EXCISION GANGLION CYST - left index finger osteophyte excision;  Surgeon: Rubin José MD;  Location: [facility] MAIN OR;  Service: Orthopedics       Family History   Problem Relation Age of Onset   • Cancer Mother         stomach   • Hypertension Mother    • Liver cancer Mother    • Cancer Father         colon   • Liver cancer Father    • Hypertension Sister    • No Known Problems Brother    • No Known Problems Daughter    • Leukemia Son    • No Known Problems Brother    • No Known Problems Daughter    • No Known Problems Son        Social History     Occupational History   • Not on file   Tobacco Use   • Smoking status: Every Day     Packs/day: 0 50     Years: 20 00     Pack years: 10      Types: Cigarettes     Last attempt to quit: 10/30/2022     Years since quittin 3   • Smokeless tobacco: Never   Vaping Use   • Vaping Use: Never used   Substance and Sexual Activity   • Alcohol use: Not Currently     Comment: socially   • Drug use: No   • Sexual activity: Not on file         Current Outpatient Medications:   •  fluticasone (Flovent HFA) 220 mcg/act inhaler, Inhale 2 puffs 2 (two) times a day Rinse mouth after use , Disp: 12 g, Rfl: 2  •  levothyroxine (Synthroid) 75 mcg tablet, Take 1 tablet (75 mcg total) by mouth daily in the early morning, Disp: 90 tablet, Rfl: 1  •  meloxicam (MOBIC) 15 mg tablet, Take 15 mg by mouth daily, Disp: , Rfl:   •  tamsulosin (FLOMAX) 0 4 mg, Take 1 capsule (0 4 mg total) by mouth daily with dinner, Disp: 30 capsule, Rfl: 2    No Known Allergies    Objective:  Vitals:    23 1022   BP: 160/90   Pulse: 76   Temp: 98 2 °F (36 8 °C)       Body mass index is 27 75 kg/m²  Right Hand Exam     Tenderness   The patient is experiencing no tenderness  Range of Motion   Hand   MP Little: normal   PIP Little: normal   DIP Little: abnormal     Other   Erythema: absent  Scars: absent  Sensation: normal  Pulse: present    Comments:  Able to form composite fist  Neurovascularly in tact distally  Mallet deformity noted little finger DIP            Physical Exam  Vitals and nursing note reviewed  Constitutional:       Appearance: Normal appearance  He is well-developed  HENT:      Head: Normocephalic and atraumatic  Right Ear: External ear normal       Left Ear: External ear normal    Eyes:      General: No scleral icterus       Extraocular Movements: Extraocular movements intact  Conjunctiva/sclera: Conjunctivae normal    Cardiovascular:      Rate and Rhythm: Normal rate  Pulmonary:      Effort: Pulmonary effort is normal  No respiratory distress  Musculoskeletal:      Cervical back: Normal range of motion and neck supple  Comments: See Ortho exam   Skin:     General: Skin is warm and dry  Neurological:      Mental Status: He is alert and oriented to person, place, and time  Psychiatric:         Behavior: Behavior normal          I have personally reviewed pertinent films in PACS  X-ray of the right hand obtained on 2/23/2023 reviewed demonstrating a proximally displaced avulsion fracture of the dorsal fifth distal phalanx  This document was created using speech voice recognition software  Grammatical errors, random word insertions, pronoun errors, and incomplete sentences are an occasional consequence of this system due to software limitations, ambient noise, and hardware issues  Any formal questions or concerns about content, text, or information contained within the body of this dictation should be directly addressed to the provider for clarification

## 2023-03-27 ENCOUNTER — OFFICE VISIT (OUTPATIENT)
Dept: OCCUPATIONAL THERAPY | Facility: CLINIC | Age: 58
End: 2023-03-27

## 2023-03-27 DIAGNOSIS — M20.011 MALLET DEFORMITY OF RIGHT LITTLE FINGER: Primary | ICD-10-CM

## 2023-03-27 NOTE — PROGRESS NOTES
Orthosis    Diagnosis:   1  Mallet deformity of right little finger          Indication: Fracture    Location: Right  small finger  Supplies: Custom Fit Orthotic  Orthosis type: Mallet/DIP Blocking  Wearing Schedule: Remove with Protected Technique Only as Needed  Describe Position: Extension to slight hyper extension    Precautions: Fracture and Universal (skin contact/breakdown)    Patient or Caregiver expresses understanding of wearing Schedule and Precautions? Yes  Patient or Caregiver able to don/doff orthotic independently? Yes    Written orders provided to patient?  No  Orders Obtained: Written  Orders Obtained from: Dr Jericho Howe    Return for evaluation and treatment No

## 2023-04-21 PROBLEM — N40.1 BENIGN PROSTATIC HYPERPLASIA WITH URINARY FREQUENCY: Status: ACTIVE | Noted: 2023-04-21

## 2023-04-21 PROBLEM — R35.0 BENIGN PROSTATIC HYPERPLASIA WITH URINARY FREQUENCY: Status: ACTIVE | Noted: 2023-04-21

## 2023-04-21 PROBLEM — Z00.00 ANNUAL PHYSICAL EXAM: Status: ACTIVE | Noted: 2023-04-21

## 2023-04-21 PROBLEM — L50.9 URTICARIA: Status: ACTIVE | Noted: 2023-04-21

## 2023-05-29 ENCOUNTER — HOSPITAL ENCOUNTER (EMERGENCY)
Facility: HOSPITAL | Age: 58
Discharge: HOME | End: 2023-05-29
Attending: EMERGENCY MEDICINE

## 2023-05-29 VITALS
SYSTOLIC BLOOD PRESSURE: 135 MMHG | BODY MASS INDEX: 26.33 KG/M2 | RESPIRATION RATE: 18 BRPM | DIASTOLIC BLOOD PRESSURE: 95 MMHG | WEIGHT: 222 LBS | TEMPERATURE: 97.9 F | HEART RATE: 80 BPM | OXYGEN SATURATION: 99 %

## 2023-05-29 DIAGNOSIS — S61.216A LACERATION OF RIGHT LITTLE FINGER WITHOUT FOREIGN BODY WITHOUT DAMAGE TO NAIL, INITIAL ENCOUNTER: Primary | ICD-10-CM

## 2023-05-29 RX ORDER — GINSENG 100 MG
1 CAPSULE ORAL ONCE
Status: COMPLETED | OUTPATIENT
Start: 2023-05-29 | End: 2023-05-29

## 2023-05-29 RX ORDER — LIDOCAINE HYDROCHLORIDE 10 MG/ML
5 INJECTION, SOLUTION EPIDURAL; INFILTRATION; INTRACAUDAL; PERINEURAL ONCE
Status: COMPLETED | OUTPATIENT
Start: 2023-05-29 | End: 2023-05-29

## 2023-05-29 RX ADMIN — LIDOCAINE HYDROCHLORIDE 5 ML: 10 INJECTION, SOLUTION EPIDURAL; INFILTRATION; INTRACAUDAL; PERINEURAL at 17:44

## 2023-05-29 RX ADMIN — BACITRACIN 1 SMALL APPLICATION: 500 OINTMENT TOPICAL at 18:07

## 2023-05-29 NOTE — ED PROVIDER NOTES
History  Chief Complaint   Patient presents with   • Finger Laceration     States cleaning outdoor furniture with steel wool and cut right 5th finger   States occurred 4 hours ago and will not stop bleeding  Patient presents for evaluation of right 5th digit laceration  States he was cleaning outdoor furniture with steel wool when he cut his finger  Had trouble controlling bleeding  Tetanus up to date  No other injury  History provided by:  Patient   used: No    Finger Laceration      Prior to Admission Medications   Prescriptions Last Dose Informant Patient Reported? Taking?   diclofenac (VOLTAREN) 75 mg EC tablet   Yes No   Sig: Take 75 mg by mouth daily   fluticasone (Flovent HFA) 220 mcg/act inhaler   No No   Sig: Inhale 2 puffs 2 (two) times a day Rinse mouth after use  Patient not taking: Reported on 4/21/2023   hydrOXYzine pamoate (VISTARIL) 25 mg capsule   No No   Sig: Take 1 capsule (25 mg total) by mouth 2 (two) times a day as needed for itching   levothyroxine (Synthroid) 75 mcg tablet   No No   Sig: Take 1 tablet (75 mcg total) by mouth daily in the early morning   meloxicam (MOBIC) 15 mg tablet   Yes No   Sig: Take 15 mg by mouth daily   montelukast (SINGULAIR) 10 mg tablet   Yes No   Sig: Take 10 mg by mouth daily at bedtime   tamsulosin (FLOMAX) 0 4 mg   No No   Sig: Take 2 pills AT bedtime      Facility-Administered Medications: None       Past Medical History:   Diagnosis Date   • Arthritis    • Contact lens/glasses fitting    • Disease of thyroid gland     hypo   • Dry skin     hands/feet   • GERD (gastroesophageal reflux disease)    • Vitamin D deficiency        Past Surgical History:   Procedure Laterality Date   • COLONOSCOPY N/A 12/22/2017    Procedure: COLONOSCOPY;  Surgeon: Lizbeth Rodriguez MD;  Location: [facility] [place] [facility]; Service: Gastroenterology   • ESOPHAGOGASTRODUODENOSCOPY N/A 12/22/2017    Procedure: ESOPHAGOGASTRODUODENOSCOPY (EGD);   Surgeon: Jose Maria Sosa Fausto Briggs MD;  Location: [facility]; Service: Gastroenterology   • NC EXC LESION TDN SHTH/JT CAPSL HAND/FNGR Left 2022    Procedure: EXCISION GANGLION CYST - left index finger osteophyte excision;  Surgeon: Anthony Lowery MD;  [facility]: [facility];  Service: Orthopedics       Family History   Problem Relation Age of Onset   • Cancer Mother         stomach   • Hypertension Mother    • Liver cancer Mother    • Cancer Father         colon   • Liver cancer Father    • Hypertension Sister    • No Known Problems Brother    • No Known Problems Daughter    • Leukemia Son    • No Known Problems Brother    • No Known Problems Daughter    • No Known Problems Son      I have reviewed and agree with the history as documented  E-Cigarette/Vaping   • E-Cigarette Use Never User      E-Cigarette/Vaping Substances     Social History     Tobacco Use   • Smoking status: Every Day     Packs/day: 0 50     Years: 20 00     Total pack years: 10 00     Types: Cigarettes     Last attempt to quit: 10/30/2022     Years since quittin 5   • Smokeless tobacco: Never   Vaping Use   • Vaping Use: Never used   Substance Use Topics   • Alcohol use: Not Currently     Comment: socially   • Drug use: No       Review of Systems   Skin: Positive for wound  All other systems reviewed and are negative  Physical Exam  Physical Exam  Vitals and nursing note reviewed  Constitutional:       General: He is not in acute distress  Musculoskeletal:        Hands:    Skin:     Findings: No rash  Neurological:      General: No focal deficit present  Mental Status: He is alert and oriented to person, place, and time           Vital Signs  ED Triage Vitals [23 1730]   Temperature Pulse Respirations Blood Pressure SpO2   (!) 96 9 °F (36 1 °C) 82 18 135/95 96 %      Temp Source Heart Rate Source Patient Position - Orthostatic VS BP Location FiO2 (%)   Tympanic Monitor Sitting Left arm --      Pain Score       4           Vitals: 05/29/23 1730 05/29/23 1817   BP: 135/95    Pulse: 82 80   Patient Position - Orthostatic VS: Sitting          Visual Acuity      ED Medications  Medications   lidocaine (PF) (XYLOCAINE-MPF) 1 % injection 5 mL (5 mL Infiltration Given 5/29/23 1744)   bacitracin topical ointment 1 small application (1 small application Topical Given 5/29/23 1807)       Diagnostic Studies  Results Reviewed     None                 No orders to display              Procedures  Laceration repair    Date/Time: 5/29/2023 6:10 PM    Performed by: Radha Burrell DO  Authorized by: Radha Burrell DO  Consent: Verbal consent obtained  Consent given by: patient  Patient identity confirmed: verbally with patient and arm band  Body area: upper extremity  Location details: right small finger  Laceration length: 1 5 cm  Anesthesia: digital block    Anesthesia:  Local Anesthetic: lidocaine 1% without epinephrine      Procedure Details:  Preparation: Patient was prepped and draped in the usual sterile fashion  Irrigation solution: saline  Irrigation method: syringe  Amount of cleaning: standard  Skin closure: 5-0 nylon  Number of sutures: 3  Approximation: close  Approximation difficulty: simple  Dressing: antibiotic ointment  Patient tolerance: patient tolerated the procedure well with no immediate complications               ED Course                               SBIRT 22yo+    Flowsheet Row Most Recent Value   Initial Alcohol Screen: US AUDIT-C     1  How often do you have a drink containing alcohol? 3 Filed at: 05/29/2023 1732   2  How many drinks containing alcohol do you have on a typical day you are drinking? 1 Filed at: 05/29/2023 1732   3a  Male UNDER 65: How often do you have five or more drinks on one occasion? 0 Filed at: 05/29/2023 1732   Audit-C Score 4 Filed at: 05/29/2023 1732   DEBRA: How many times in the past year have you    Used an illegal drug or used a prescription medication for non-medical reasons?  Never Filed at: 05/29/2023 1732                    Medical Decision Making  Pulse ox 96% on RA indicating adequate oxygenation     Risk  OTC drugs  Prescription drug management  Disposition  Final diagnoses:   Laceration of right little finger without foreign body without damage to nail, initial encounter     Time reflects when diagnosis was documented in both MDM as applicable and the Disposition within this note     Time User Action Codes Description Comment    5/29/2023  6:02 PM Chester Mitchell Add [H22 491B] Laceration of right little finger without foreign body without damage to nail, initial encounter       ED Disposition     ED Disposition   Discharge    Condition   Stable    Date/Time   Mon May 29, 2023  6:02 PM    Comment   Niurka Astudillo discharge to home/self care                 Follow-up Information     Follow up With Specialties Details Why Contact Info    Myrna Agee MD Internal Medicine  or ER in 12 days for suture removal [id number] [address]  [phone]            Discharge Medication List as of 5/29/2023  6:03 PM      CONTINUE these medications which have NOT CHANGED    Details   diclofenac (VOLTAREN) 75 mg EC tablet Take 75 mg by mouth daily, Historical Med      fluticasone (Flovent HFA) 220 mcg/act inhaler Inhale 2 puffs 2 (two) times a day Rinse mouth after use , Starting Mon 1/23/2023, Normal      hydrOXYzine pamoate (VISTARIL) 25 mg capsule Take 1 capsule (25 mg total) by mouth 2 (two) times a day as needed for itching, Starting Fri 4/21/2023, Normal      levothyroxine (Synthroid) 75 mcg tablet Take 1 tablet (75 mcg total) by mouth daily in the early morning, Starting Fri 4/21/2023, Normal      meloxicam (MOBIC) 15 mg tablet Take 15 mg by mouth daily, Historical Med      montelukast (SINGULAIR) 10 mg tablet Take 10 mg by mouth daily at bedtime, Historical Med      tamsulosin (FLOMAX) 0 4 mg Take 2 pills AT bedtime, Normal             No discharge procedures on file      PDMP Review       Value Time User    PDMP Reviewed  Yes 4/21/2023  8:51 AM Ramakrishna Carrera MD          ED Provider  Electronically Signed by           Sam Denver, DO  05/29/23 1957

## 2023-05-29 NOTE — ED NOTES
Provider Monty at bedside        Jett Macias, [facility]  05/29/23 9680

## 2023-05-29 NOTE — Clinical Note
Sanchez Dumont was seen and treated in our emergency department on 5/29/2023  Diagnosis:     Kandy White    He may return on this date:     Please excuse from work for 2 days  If you have any questions or concerns, please don't hesitate to call        Pebbles Xiong DO    ______________________________           _______________          _______________  Hospital Representative                              Date                                Time

## 2023-05-29 NOTE — ED NOTES
Finger cleaned and wrapped  No questions at this time from patient  AVS reviewed       Pablo Serra RN  05/29/23 8521

## 2023-05-31 ENCOUNTER — VBI (OUTPATIENT)
Dept: ADMINISTRATIVE | Facility: OTHER | Age: 58
End: 2023-05-31

## 2023-05-31 NOTE — TELEPHONE ENCOUNTER
ED Visit Information     Ed visit date: 5/29/2023  Diagnosis Description: finger laceration  In Network? Yes [facility]  Discharge status: Home  Discharged with meds ? Yes  Number of ED visits to date: 1  ED Severity:n/a     Outreach Information    Outreach successful: No 3  Date letter mailed: 6/1/23  Date Finalized:6/1/23    Care Coordination    Follow up appointment with pcp: no f/u was canceled by pt today  Transportation issues ?  NA    [facility] Outreach    Emergent necessity warranted by diagnosis:  Yes  [facility] PCP:  Yes  Transportation:  Friend/Family Transport  Called PCP first?:  No  Feels able to call PCP for urgent problems ?:  Yes  Understands what emergencies can be handled by PCP ?:  Yes  Ever any problems getting appointment with PCP for minor emergency/urgency problems?:  No  Practice Contacted Patient ?:  No  Pt had ED follow up with pcp/staff ?:  No    Seen for follow-up out of network ?:  No  Urgent care Education?:  Yes

## 2023-05-31 NOTE — LETTER
[facility]  [address] [address]    Date: 06/01/23  Luis Miguel Aldrich [address] [place],[address] [address]    Dear Ann Evans: Thank you for choosing [facility] emergency department for care  Your primary care provider wants to make sure that your ongoing medical care is being addressed  If you require follow up care as a result of your emergency department visit, there are a few things the practice would like you to know  As part of the network's continuing commitment to caring for our patients, we have added more same day appointments and have extended office hours to meet your medical needs  After hours, on-call physicians are available via your primary care provider's main office line  We encourage you to contact our office prior to seeking treatment to discuss your symptoms with the medical staff  Together, we can determine the correct course of action  A majority of non-emergent conditions such as: common cold, flu-like symptoms, fevers, strains/sprains, dislocations, minor burns, cuts and animal bites can be treated at [facility] facilities  Diagnostic testing is available at some sites  Of course, if you are experiencing a life threatening medical emergency call 911 or proceed directly to the nearest emergency room  Your nearest [facility] facility is conveniently located at:    [address] [address]  [phone]  [facility] offered at most [address] your spot online at www [email or web address] or on the [facility] [address]    Sincerely,    [facility]  No information on file

## 2023-06-02 ENCOUNTER — APPOINTMENT (OUTPATIENT)
Dept: RADIOLOGY | Age: 58
End: 2023-06-02
Payer: COMMERCIAL

## 2023-06-02 DIAGNOSIS — M19.012 ARTHRITIS OF LEFT SHOULDER REGION: ICD-10-CM

## 2023-06-02 DIAGNOSIS — M17.12 OSTEOARTHRITIS OF LEFT KNEE, UNSPECIFIED OSTEOARTHRITIS TYPE: ICD-10-CM

## 2023-06-02 PROCEDURE — 73560 X-RAY EXAM OF KNEE 1 OR 2: CPT

## 2023-06-02 PROCEDURE — 73030 X-RAY EXAM OF SHOULDER: CPT

## 2023-06-22 ENCOUNTER — OFFICE VISIT (OUTPATIENT)
Dept: INTERNAL MEDICINE CLINIC | Facility: CLINIC | Age: 58
End: 2023-06-22
Payer: COMMERCIAL

## 2023-06-22 VITALS
TEMPERATURE: 98 F | RESPIRATION RATE: 16 BRPM | OXYGEN SATURATION: 96 % | BODY MASS INDEX: 26.92 KG/M2 | WEIGHT: 228 LBS | HEIGHT: 77 IN | DIASTOLIC BLOOD PRESSURE: 86 MMHG | HEART RATE: 65 BPM | SYSTOLIC BLOOD PRESSURE: 138 MMHG

## 2023-06-22 DIAGNOSIS — L50.9 URTICARIA: ICD-10-CM

## 2023-06-22 DIAGNOSIS — J45.991 COUGH VARIANT ASTHMA: ICD-10-CM

## 2023-06-22 DIAGNOSIS — E03.9 HYPOTHYROIDISM, UNSPECIFIED TYPE: ICD-10-CM

## 2023-06-22 DIAGNOSIS — I10 PRIMARY HYPERTENSION: Primary | ICD-10-CM

## 2023-06-22 DIAGNOSIS — M25.512 CHRONIC LEFT SHOULDER PAIN: ICD-10-CM

## 2023-06-22 DIAGNOSIS — E78.2 MIXED HYPERLIPIDEMIA: ICD-10-CM

## 2023-06-22 DIAGNOSIS — R21 RASH AND NONSPECIFIC SKIN ERUPTION: ICD-10-CM

## 2023-06-22 DIAGNOSIS — J30.1 ALLERGIC RHINITIS DUE TO POLLEN, UNSPECIFIED SEASONALITY: ICD-10-CM

## 2023-06-22 DIAGNOSIS — R39.15 URGENCY OF URINATION: ICD-10-CM

## 2023-06-22 DIAGNOSIS — M17.0 PRIMARY OSTEOARTHRITIS OF BOTH KNEES: ICD-10-CM

## 2023-06-22 DIAGNOSIS — B35.1 ONYCHOMYCOSIS: ICD-10-CM

## 2023-06-22 DIAGNOSIS — G89.29 CHRONIC LEFT SHOULDER PAIN: ICD-10-CM

## 2023-06-22 PROCEDURE — 99214 OFFICE O/P EST MOD 30 MIN: CPT | Performed by: INTERNAL MEDICINE

## 2023-06-22 RX ORDER — LEVOTHYROXINE SODIUM 0.07 MG/1
75 TABLET ORAL
Qty: 90 TABLET | Refills: 1 | Status: SHIPPED | OUTPATIENT
Start: 2023-06-22

## 2023-06-22 RX ORDER — TAMSULOSIN HYDROCHLORIDE 0.4 MG/1
CAPSULE ORAL
Qty: 180 CAPSULE | Refills: 1 | Status: SHIPPED | OUTPATIENT
Start: 2023-06-22

## 2023-06-22 RX ORDER — MELOXICAM 15 MG/1
15 TABLET ORAL DAILY
Qty: 90 TABLET | Refills: 1 | Status: SHIPPED | OUTPATIENT
Start: 2023-06-22

## 2023-06-22 RX ORDER — MONTELUKAST SODIUM 10 MG/1
10 TABLET ORAL
Qty: 90 TABLET | Refills: 1 | Status: SHIPPED | OUTPATIENT
Start: 2023-06-22

## 2023-06-22 RX ORDER — HYDROXYZINE PAMOATE 25 MG/1
25 CAPSULE ORAL 2 TIMES DAILY PRN
Qty: 60 CAPSULE | Refills: 2 | Status: SHIPPED | OUTPATIENT
Start: 2023-06-22

## 2023-06-22 NOTE — ASSESSMENT & PLAN NOTE
Diastolic 86 will advise keep monitoring blood pressure continue low-salt diet if needed start lisinopril discussed

## 2023-06-22 NOTE — PATIENT INSTRUCTIONS
Hypertension   WHAT YOU NEED TO KNOW:   Hypertension is high blood pressure  Your blood pressure is the force of your blood moving against the walls of your arteries  Hypertension causes your blood pressure to get so high that your heart has to work much harder than normal  This can damage your heart  Hypertension that does not respond to medicines and lifestyle changes is called resistant hypertension  Hypertension is considered chronic when it continues for 3 months or longer  DISCHARGE INSTRUCTIONS:   Call your local emergency number (911 in the [address] [facility]) or have someone call if:   You have chest pain  You have any of the following signs of a heart attack:      Squeezing, pressure, or pain in your chest    You may  also have any of the following:     Discomfort or pain in your back, neck, jaw, stomach, or arm    Shortness of breath    Nausea or vomiting    Lightheadedness or a sudden cold sweat    You become confused or have trouble speaking  You suddenly feel lightheaded or have trouble breathing  Return to the emergency department if:   You have a severe headache or vision loss  You have weakness in an arm or leg  Call your doctor or cardiologist if:   You feel faint, dizzy, confused, or drowsy  You have been taking your blood pressure medicine but your pressure is higher than your provider says it should be  You have questions or concerns about your condition or care  Medicines: You may  need any of the following:  Antihypertensives  may be used to help lower your blood pressure  Several kinds of medicines are available  Your healthcare provider will choose medicines based on the kind of hypertension you have  You may need more than one type of medicine  Take the medicine exactly as directed  Diuretics  help decrease extra fluid that collects in your body  This will help lower your blood pressure  You may urinate more often while you take this medicine      Cholesterol medicine  helps lower your cholesterol level  A low cholesterol level helps prevent heart disease and makes it easier to control your blood pressure  Take your medicine as directed  Contact your healthcare provider if you think your medicine is not helping or if you have side effects  Tell your provider if you are allergic to any medicine  Keep a list of the medicines, vitamins, and herbs you take  Include the amounts, and when and why you take them  Bring the list or the pill bottles to follow-up visits  Carry your medicine list with you in case of an emergency  Follow up with your doctor or cardiologist as directed: You will need to return to have your blood pressure checked and to have other lab tests done  Write down your questions so you remember to ask them during your visits  Stages of hypertension:  Your healthcare provider will give you a blood pressure goal based on your age, health, and risk for cardiovascular disease  The following are general guidelines on the stages of hypertension:  Normal blood pressure is 119/79 or lower   Your healthcare provider may only check your blood pressure each year if it stays at a normal level  Elevated blood pressure is 120/79 to 129/79   This is sometimes called prehypertension  Your healthcare provider may suggest lifestyle changes to help lower your blood pressure to a normal level  He or she may then check it again in 3 to 6 months  Stage 1 hypertension is 130/80  to 139/89   Your provider may recommend lifestyle changes, medication, and checks every 3 to 6 months until your blood pressure is controlled  Stage 2 hypertension is 140/90 or higher   Your provider will recommend lifestyle changes and have you take 2 kinds of hypertension medicines  You will also need to have your blood pressure checked monthly until it is controlled  Manage hypertension:   Check your blood pressure at home    Do not smoke, have caffeine, or exercise for at least 30 minutes before you check your blood pressure  Sit and rest for 5 minutes before you check your blood pressure  Extend your arm and support it on a flat surface  Your arm should be at the same level as your heart  Follow the directions that came with your blood pressure monitor  Check your blood pressure 2 times, 1 minute apart, before you take your medicine in the morning  Also check your blood pressure before your evening meal  Keep a record of your readings and bring it to your follow-up visits  Ask your healthcare provider what your blood pressure should be  Manage any other health conditions you have  Health conditions such as diabetes can increase your risk for hypertension  Follow your healthcare provider's instructions and take all your medicines as directed  Ask about all medicines  Certain medicines can increase your blood pressure  Examples include oral birth control pills, decongestants, herbal supplements, and NSAIDs, such as ibuprofen  Your healthcare provider can tell you which medicines are safe for you to take  This includes prescription and over-the-counter medicines  Lifestyle changes you can make to manage hypertension:  Your healthcare provider may recommend you work with a team to manage hypertension  The team may include medical experts such as a dietitian, an exercise or physical therapist, and a behavior therapist  Your family members may be included in helping you create lifestyle changes  Limit sodium (salt) as directed  Too much sodium can affect your fluid balance  Check labels to find low-sodium or no-salt-added foods  Some low-sodium foods use potassium salts for flavor  Too much potassium can also cause health problems  Your healthcare provider will tell you how much sodium and potassium are safe for you to have in a day  He or she may recommend that you limit sodium to 2,300 mg a day  Follow the meal plan recommended by your healthcare provider    A dietitian or your provider can give you more information on low-sodium plans or the DASH (Dietary Approaches to Stop Hypertension) eating plan  The DASH plan is low in sodium, processed sugar, unhealthy fats, and total fat  It is high in potassium, calcium, and fiber  These can be found in vegetables, fruit, and whole-grain foods  Be physically active throughout the day  Physical activity, such as exercise, can help control your blood pressure and your weight  Be physically active for at least 30 minutes per day, on most days of the week  Include aerobic activity, such as walking or riding a bicycle  Also include strength training at least 2 times each week  Your healthcare providers can help you create a physical activity plan  Decrease stress  This may help lower your blood pressure  Learn ways to relax, such as deep breathing or listening to music  Limit alcohol as directed  Alcohol can increase your blood pressure  A drink of alcohol is 12 ounces of beer, 5 ounces of wine, or 1½ ounces of liquor  Do not smoke  Nicotine and other chemicals in cigarettes and cigars can increase your blood pressure and also cause lung damage  Ask your healthcare provider for information if you currently smoke and need help to quit  E-cigarettes or smokeless tobacco still contain nicotine  Talk to your healthcare provider before you use these products  © Copyright [facility] 2022 Information is for End User's use only and may not be sold, redistributed or otherwise used for commercial purposes  The above information is an  only  It is not intended as medical advice for individual conditions or treatments  Talk to your doctor, nurse or pharmacist before following any medical regimen to see if it is safe and effective for you

## 2023-06-22 NOTE — PROGRESS NOTES
BMI Counseling: Body mass index is 27 04 kg/m²  The BMI is above normal  Nutrition recommendations include decreasing portion sizes, encouraging healthy choices of fruits and vegetables, decreasing fast food intake, consuming healthier snacks, limiting drinks that contain sugar, moderation in carbohydrate intake, increasing intake of lean protein, reducing intake of saturated and trans fat and reducing intake of cholesterol  Exercise recommendations include moderate physical activity 150 minutes/week and exercising 3-5 times per week  No pharmacotherapy was ordered  Rationale for BMI follow-up plan is due to patient being overweight or obese  Depression Screening and Follow-up Plan: Patient was screened for depression during today's encounter  They screened negative with a PHQ-2 score of 0  Tobacco Cessation Counseling: Tobacco cessation counseling was provided  The patient is sincerely urged to quit consumption of tobacco  He is not ready to quit tobacco        Dr Fadia Zafar Office Visit Note  23     Carmelita Moser 65 y o  male MRN: [id number]  : 1965    Assessment:     1  Primary hypertension  Assessment & Plan:  Diastolic 86 will advise keep monitoring blood pressure continue low-salt diet if needed start lisinopril discussed      2  Urgency of urination  Assessment & Plan:  Symptoms controlled continue Flomax    Orders:  -     tamsulosin (FLOMAX) 0 4 mg; Take 2 pills AT bedtime    3  Hypothyroidism, unspecified type  Assessment & Plan:  Symptoms controlled continue current dose of Synthroid    Orders:  -     levothyroxine (Synthroid) 75 mcg tablet; Take 1 tablet (75 mcg total) by mouth daily in the early morning    4  Urticaria  Assessment & Plan:  Rash itching both feet and hand no response to Claritin or hydroxyzine to be seen by dermatology    Orders:  -     hydrOXYzine pamoate (VISTARIL) 25 mg capsule; Take 1 capsule (25 mg total) by mouth 2 (two) times a day as needed for itching    5  Primary osteoarthritis of both knees  Assessment & Plan:  Mobic 15 mg daily symptoms seems to be controlled    Orders:  -     meloxicam (MOBIC) 15 mg tablet; Take 1 tablet (15 mg total) by mouth daily    6  Mixed hyperlipidemia  Assessment & Plan:  LDL seems to be controlled last 1 was 63 on low-fat low-cholesterol diet continue current treatment      7  Onychomycosis  Assessment & Plan:  Awaiting to be seen by podiatrist no response to Tinactin or clotrimazole cream    Orders:  -     Ambulatory Referral to Podiatry; Future    8  Rash and nonspecific skin eruption  -     Ambulatory Referral to Dermatology; Future    9  Allergic rhinitis due to pollen, unspecified seasonality  -     montelukast (SINGULAIR) 10 mg tablet; Take 1 tablet (10 mg total) by mouth daily at bedtime    10  Cough variant asthma  Assessment & Plan:  Continue singular symptoms controlled no wheezing no difficulty breathing      11  Chronic left shoulder pain  Assessment & Plan:  MRI done shows tendon tear awaiting to be seen by orthopedist further management as per orthopedist            Discussion Summary and Plan: Today's care plan and medications were reviewed with patient in detail and all their questions answered to their satisfaction      Chief Complaint   Patient presents with   • physical for drivers license      Subjective:  Came in follow-up chronic medical condition listed under visit diagnosis complaining for persistent itching blood supply and assault in spite of her previous treatment with hydrocortisone ointment and hydroxyzine so advised to be evaluated by dermatology also left shoulder pain MRI done reviewed awaiting to be seen by orthopedic being treated by orthopedist in the clinic for follow-up chronic medical condition listed under visit diagnosis and refill of the meds      The following portions of the patient's history were reviewed and updated as appropriate: allergies, current medications, past family history, past medical history, past social history, past surgical history and problem list     Review of Systems   Constitutional: Positive for fatigue  Negative for activity change, appetite change, chills, diaphoresis, fever and unexpected weight change  HENT: Negative for congestion, dental problem, drooling, ear discharge, ear pain, facial swelling, hearing loss, mouth sores, nosebleeds, postnasal drip, rhinorrhea, sinus pressure, sneezing, sore throat, tinnitus, trouble swallowing and voice change  Eyes: Negative for photophobia, pain, discharge, redness, itching and visual disturbance  Respiratory: Negative for apnea, choking, chest tightness, shortness of breath, wheezing and stridor  Cardiovascular: Negative for chest pain, palpitations and leg swelling  Gastrointestinal: Negative for abdominal distention, abdominal pain, anal bleeding, blood in stool, constipation, diarrhea, nausea, rectal pain and vomiting  Endocrine: Negative for cold intolerance, heat intolerance, polydipsia, polyphagia and polyuria  Genitourinary: Negative for decreased urine volume, difficulty urinating, dysuria, enuresis, flank pain, frequency, genital sores, hematuria and urgency  Musculoskeletal: Negative for arthralgias, back pain, gait problem, joint swelling, myalgias, neck pain and neck stiffness  Skin: Negative for color change, pallor, rash and wound  Allergic/Immunologic: Negative  Negative for environmental allergies, food allergies and immunocompromised state  Neurological: Negative for dizziness, tremors, seizures, syncope, facial asymmetry, speech difficulty, weakness, light-headedness, numbness and headaches  Psychiatric/Behavioral: Negative for agitation, behavioral problems, confusion, decreased concentration, dysphoric mood, hallucinations, self-injury, sleep disturbance and suicidal ideas  The patient is not nervous/anxious and is not hyperactive            Historical Information   Patient Active Problem List   Diagnosis   • Acquired hallux valgus of both feet   • Onychomycosis   • Chronic pain of left knee   • Generalized pruritus   • Chronic diarrhea   • Prediabetes   • BMI 26 0-26 9,adult   • Elevated blood pressure reading   • Smoking   • Intrinsic eczema   • Osteoarthritis of both knees   • Primary hypertension   • Mixed hyperlipidemia   • Hypothyroidism   • Osteophyte of left hand   • Cough variant asthma   • Chronic cough   • Urgency of urination   • Injury of right hand   • Benign prostatic hyperplasia with urinary frequency   • Urticaria   • Annual physical exam   • Chronic left shoulder pain     Past Medical History:   Diagnosis Date   • Arthritis    • Contact lens/glasses fitting    • Disease of thyroid gland     hypo   • Dry skin     hands/feet   • GERD (gastroesophageal reflux disease)    • Vitamin D deficiency      Past Surgical History:   Procedure Laterality Date   • COLONOSCOPY N/A 2017    Procedure: COLONOSCOPY;  Surgeon: Berenice Villalobos MD;  Location: [facility]; Service: Gastroenterology   • ESOPHAGOGASTRODUODENOSCOPY N/A 2017    Procedure: ESOPHAGOGASTRODUODENOSCOPY (EGD); Surgeon: Berenice Villalobos MD;  Location: [facility] LAB;   Service: Gastroenterology   • MO EXC LESION TDN SHTH/JT CAPSL HAND/FNGR Left 2022    Procedure: EXCISION GANGLION CYST - left index finger osteophyte excision;  Surgeon: Malathi Segal MD;  Location: [facility] MAIN OR;  Service: Orthopedics     Social History     Substance and Sexual Activity   Alcohol Use Not Currently    Comment: socially     Social History     Substance and Sexual Activity   Drug Use No     Social History     Tobacco Use   Smoking Status Every Day   • Packs/day: 0 50   • Years: 20 00   • Total pack years: 10 00   • Types: Cigarettes   • Last attempt to quit: 10/30/2022   • Years since quittin 6   Smokeless Tobacco Never     Family History   Problem Relation Age of Onset   • Cancer Mother         stomach   • Hypertension "Mother    • Liver cancer Mother    • Cancer Father         colon   • Liver cancer Father    • Hypertension Sister    • No Known Problems Brother    • No Known Problems Daughter    • Leukemia Son    • No Known Problems Brother    • No Known Problems Daughter    • No Known Problems Son      Health Maintenance Due   Topic   • Hepatitis C Screening    • HIV Screening    • Pneumococcal Vaccine: Pediatrics (0 to 5 Years) and At-Risk Patients (6 to 59 Years) (2 - PCV)   • COVID-19 Vaccine (2 - Moderna series)   • OT PLAN OF CARE    • Influenza Vaccine (Season Ended)      Meds/Allergies       Current Outpatient Medications:   •  hydrOXYzine pamoate (VISTARIL) 25 mg capsule, Take 1 capsule (25 mg total) by mouth 2 (two) times a day as needed for itching, Disp: 60 capsule, Rfl: 2  •  levothyroxine (Synthroid) 75 mcg tablet, Take 1 tablet (75 mcg total) by mouth daily in the early morning, Disp: 90 tablet, Rfl: 1  •  meloxicam (MOBIC) 15 mg tablet, Take 1 tablet (15 mg total) by mouth daily, Disp: 90 tablet, Rfl: 1  •  montelukast (SINGULAIR) 10 mg tablet, Take 1 tablet (10 mg total) by mouth daily at bedtime, Disp: 90 tablet, Rfl: 1  •  tamsulosin (FLOMAX) 0 4 mg, Take 2 pills AT bedtime, Disp: 180 capsule, Rfl: 1      Objective:    Vitals:   /86   Pulse 65   Temp 98 °F (36 7 °C)   Resp 16   Ht 6' 5\" (1 956 m)   Wt 103 kg (228 lb)   SpO2 96%   BMI 27 04 kg/m²   Body mass index is 27 04 kg/m²  Vitals:    06/22/23 0911   Weight: 103 kg (228 lb)       Physical Exam    Lab Review   No visits with results within 2 Month(s) from this visit     Latest known visit with results is:   Appointment on 02/23/2023   Component Date Value Ref Range Status   • Color,  02/23/2023 Yellow   Final   • Clarity,  02/23/2023 Clear   Final   • Specific Gravity,  02/23/2023 1 017  1 003 - 1 030 Final   • pH, UA 02/23/2023 6 5  4 5, 5 0, 5 5, 6 0, 6 5, 7 0, 7 5, 8 0 Final   • Leukocytes,  02/23/2023 Negative  Negative Final   • " Nitrite, UA 02/23/2023 Negative  Negative Final   • Protein, UA 02/23/2023 Trace (A)  Negative mg/dl Final   • Glucose, UA 02/23/2023 Negative  Negative mg/dl Final   • Ketones, UA 02/23/2023 Negative  Negative mg/dl Final   • Urobilinogen, UA 02/23/2023 <2 0  <2 0 mg/dl mg/dl Final   • Bilirubin, UA 02/23/2023 Negative  Negative Final   • Occult Blood, UA 02/23/2023 Trace (A)  Negative Final   • RBC, UA 02/23/2023 None Seen  None Seen, 1-2 /hpf Final   • WBC, UA 02/23/2023 1-2  None Seen, 1-2 /hpf Final   • Epithelial Cells 02/23/2023 None Seen  None Seen, Occasional /hpf Final   • Bacteria, UA 02/23/2023 None Seen  None Seen, Occasional /hpf Final   • MUCUS THREADS 02/23/2023 Moderate (A)  None Seen Final         Patient Instructions   Hypertension   WHAT YOU NEED TO KNOW:   Hypertension is high blood pressure  Your blood pressure is the force of your blood moving against the walls of your arteries  Hypertension causes your blood pressure to get so high that your heart has to work much harder than normal  This can damage your heart  Hypertension that does not respond to medicines and lifestyle changes is called resistant hypertension  Hypertension is considered chronic when it continues for 3 months or longer  DISCHARGE INSTRUCTIONS:   Call your local emergency number (911 in the [address] [place] [address],[address]) or have someone call if:   You have chest pain  You have any of the following signs of a heart attack:      Squeezing, pressure, or pain in your chest    You may  also have any of the following:     Discomfort or pain in your back, neck, jaw, stomach, or arm    Shortness of breath    Nausea or vomiting    Lightheadedness or a sudden cold sweat    You become confused or have trouble speaking  You suddenly feel lightheaded or have trouble breathing  Return to the emergency department if:   You have a severe headache or vision loss  You have weakness in an arm or leg      Call your doctor or cardiologist if:   You feel faint, dizzy, confused, or drowsy  You have been taking your blood pressure medicine but your pressure is higher than your provider says it should be  You have questions or concerns about your condition or care  Medicines: You may  need any of the following:  Antihypertensives  may be used to help lower your blood pressure  Several kinds of medicines are available  Your healthcare provider will choose medicines based on the kind of hypertension you have  You may need more than one type of medicine  Take the medicine exactly as directed  Diuretics  help decrease extra fluid that collects in your body  This will help lower your blood pressure  You may urinate more often while you take this medicine  Cholesterol medicine  helps lower your cholesterol level  A low cholesterol level helps prevent heart disease and makes it easier to control your blood pressure  Take your medicine as directed  Contact your healthcare provider if you think your medicine is not helping or if you have side effects  Tell your provider if you are allergic to any medicine  Keep a list of the medicines, vitamins, and herbs you take  Include the amounts, and when and why you take them  Bring the list or the pill bottles to follow-up visits  Carry your medicine list with you in case of an emergency  Follow up with your doctor or cardiologist as directed: You will need to return to have your blood pressure checked and to have other lab tests done  Write down your questions so you remember to ask them during your visits  Stages of hypertension:  Your healthcare provider will give you a blood pressure goal based on your age, health, and risk for cardiovascular disease  The following are general guidelines on the stages of hypertension:  Normal blood pressure is 119/79 or lower   Your healthcare provider may only check your blood pressure each year if it stays at a normal level  Elevated blood pressure is 120/79 to 129/79    This is sometimes called prehypertension  Your healthcare provider may suggest lifestyle changes to help lower your blood pressure to a normal level  He or she may then check it again in 3 to 6 months  Stage 1 hypertension is 130/80  to 139/89   Your provider may recommend lifestyle changes, medication, and checks every 3 to 6 months until your blood pressure is controlled  Stage 2 hypertension is 140/90 or higher   Your provider will recommend lifestyle changes and have you take 2 kinds of hypertension medicines  You will also need to have your blood pressure checked monthly until it is controlled  Manage hypertension:   Check your blood pressure at home  Do not smoke, have caffeine, or exercise for at least 30 minutes before you check your blood pressure  Sit and rest for 5 minutes before you check your blood pressure  Extend your arm and support it on a flat surface  Your arm should be at the same level as your heart  Follow the directions that came with your blood pressure monitor  Check your blood pressure 2 times, 1 minute apart, before you take your medicine in the morning  Also check your blood pressure before your evening meal  Keep a record of your readings and bring it to your follow-up visits  Ask your healthcare provider what your blood pressure should be  Manage any other health conditions you have  Health conditions such as diabetes can increase your risk for hypertension  Follow your healthcare provider's instructions and take all your medicines as directed  Ask about all medicines  Certain medicines can increase your blood pressure  Examples include oral birth control pills, decongestants, herbal supplements, and NSAIDs, such as ibuprofen  Your healthcare provider can tell you which medicines are safe for you to take  This includes prescription and over-the-counter medicines      Lifestyle changes you can make to manage hypertension:  Your healthcare provider may recommend you work with a team to manage hypertension  The team may include medical experts such as a dietitian, an exercise or physical therapist, and a behavior therapist  Your family members may be included in helping you create lifestyle changes  Limit sodium (salt) as directed  Too much sodium can affect your fluid balance  Check labels to find low-sodium or no-salt-added foods  Some low-sodium foods use potassium salts for flavor  Too much potassium can also cause health problems  Your healthcare provider will tell you how much sodium and potassium are safe for you to have in a day  He or she may recommend that you limit sodium to 2,300 mg a day  Follow the meal plan recommended by your healthcare provider  A dietitian or your provider can give you more information on low-sodium plans or the DASH (Dietary Approaches to Stop Hypertension) eating plan  The DASH plan is low in sodium, processed sugar, unhealthy fats, and total fat  It is high in potassium, calcium, and fiber  These can be found in vegetables, fruit, and whole-grain foods  Be physically active throughout the day  Physical activity, such as exercise, can help control your blood pressure and your weight  Be physically active for at least 30 minutes per day, on most days of the week  Include aerobic activity, such as walking or riding a bicycle  Also include strength training at least 2 times each week  Your healthcare providers can help you create a physical activity plan  Decrease stress  This may help lower your blood pressure  Learn ways to relax, such as deep breathing or listening to music  Limit alcohol as directed  Alcohol can increase your blood pressure  A drink of alcohol is 12 ounces of beer, 5 ounces of wine, or 1½ ounces of liquor  Do not smoke  Nicotine and other chemicals in cigarettes and cigars can increase your blood pressure and also cause lung damage   Ask your healthcare provider for information if "you currently smoke and need help to quit  E-cigarettes or smokeless tobacco still contain nicotine  Talk to your healthcare provider before you use these products  © Copyright [facility] 2022 Information is for End User's use only and may not be sold, redistributed or otherwise used for commercial purposes  The above information is an  only  It is not intended as medical advice for individual conditions or treatments  Talk to your doctor, nurse or pharmacist before following any medical regimen to see if it is safe and effective for you  Héctor Freitas MD        \"This note has been constructed using a voice recognition system  Therefore there may be syntax, spelling, and/or grammatical errors  Please call if you have any questions   \"  "

## 2023-07-17 DIAGNOSIS — L50.9 URTICARIA: ICD-10-CM

## 2023-07-17 RX ORDER — HYDROXYZINE PAMOATE 25 MG/1
25 CAPSULE ORAL 2 TIMES DAILY PRN
Qty: 180 CAPSULE | Refills: 1 | Status: SHIPPED | OUTPATIENT
Start: 2023-07-17

## 2023-07-21 ENCOUNTER — OFFICE VISIT (OUTPATIENT)
Dept: OBGYN CLINIC | Facility: CLINIC | Age: 58
End: 2023-07-21

## 2023-07-21 VITALS — SYSTOLIC BLOOD PRESSURE: 162 MMHG | DIASTOLIC BLOOD PRESSURE: 95 MMHG | HEART RATE: 72 BPM

## 2023-07-21 DIAGNOSIS — M75.122 COMPLETE TEAR OF LEFT ROTATOR CUFF, UNSPECIFIED WHETHER TRAUMATIC: Primary | ICD-10-CM

## 2023-07-21 DIAGNOSIS — M54.12 RADICULOPATHY, CERVICAL REGION: ICD-10-CM

## 2023-07-21 RX ORDER — TERBINAFINE HYDROCHLORIDE 250 MG/1
TABLET ORAL
COMMUNITY
Start: 2023-06-26

## 2023-07-21 RX ORDER — BUPIVACAINE HYDROCHLORIDE 2.5 MG/ML
4 INJECTION, SOLUTION INFILTRATION; PERINEURAL
Status: COMPLETED | OUTPATIENT
Start: 2023-07-21 | End: 2023-07-21

## 2023-07-21 RX ORDER — TRIAMCINOLONE ACETONIDE 40 MG/ML
40 INJECTION, SUSPENSION INTRA-ARTICULAR; INTRAMUSCULAR
Status: COMPLETED | OUTPATIENT
Start: 2023-07-21 | End: 2023-07-21

## 2023-07-21 RX ADMIN — TRIAMCINOLONE ACETONIDE 40 MG: 40 INJECTION, SUSPENSION INTRA-ARTICULAR; INTRAMUSCULAR at 11:15

## 2023-07-21 RX ADMIN — BUPIVACAINE HYDROCHLORIDE 4 ML: 2.5 INJECTION, SOLUTION INFILTRATION; PERINEURAL at 11:15

## 2023-07-21 NOTE — PROGRESS NOTES
Ortho Sports Medicine Shoulder New Patient Visit     Assesment:   65 y.o. male left shoulder Full thickness rotator cuff tear with cervical radiculopathy to Right upper extremity    Plan:  The patient's diagnosis and treatment were discussed at length today. We discussed no treatment, non-operative treatment, and operative treatment. Adalid De La Vega presents today for initial evaluation bilateral shoulder pain. I discussed with him that his left shoulder does demonstrate a full-thickness rotator cuff tear. I discussed with him given the extent of his tear as well as his young age I do recommend surgical intervention of her left shoulder arthroscopy with rotator cuff repair, however he states that he would be unable to take the time off of work and would not like to proceed forward with surgical intervention. Given this I did provide him with a referral to outpatient physical therapy for shoulder, scapular, and rotator cuff strengthening range of motion exercises. I also provided him with a left shoulder subacromial bursal injection. I discussed with him within the injection there is a numbing medication will begin to take effect over the next 24 to 48 hours before the steroid begins to take into effect over the next week. I discussed with him that if he receives significant relief of his symptoms following this injection we can repeat this every 3 months. I discussed with him that he is able to continue perform activity as tolerated, using pain as a guide, however repetitive overhead motion will increase his symptoms. He did state that he has some interest in PRP injections, however I discussed with him that I do not perform these injections, but if he wishes to proceed forward with them I can provide him with a referral to one of our sports medicine primary care providers who does perform the injection. He can continue to use ice and over-the-counter medication as needed for pain relief.   He is to follow-up on an as-needed basis. In regards to his right shoulder I discussed with him that I do believe his symptoms are related to cervical radiculopathy as he does demonstrate significant radicular symptoms including numbness and tingling and occasional weakness in  strength. Given this I discussed with him that I will provide him with a referral to one of our spine and pain specialist who can further discuss treatment options in regards to this condition. Large joint arthrocentesis: L subacromial bursa  Universal Protocol:  Consent: Verbal consent obtained. Risks and benefits: risks, benefits and alternatives were discussed  Consent given by: patient  Time out: Immediately prior to procedure a "time out" was called to verify the correct patient, procedure, equipment, support staff and site/side marked as required. Timeout called at: 7/21/2023 3:48 PM.  Patient understanding: patient states understanding of the procedure being performed  Patient consent: the patient's understanding of the procedure matches consent given  Site marked: the operative site was marked  Patient identity confirmed: verbally with patient    Supporting Documentation  Indications: pain and diagnostic evaluation   Procedure Details  Location: shoulder - L subacromial bursa  Preparation: Patient was prepped and draped in the usual sterile fashion  Needle size: 22 G  Ultrasound guidance: no  Approach: posterior  Medications administered: 4 mL bupivacaine 0.25 %; 40 mg triamcinolone acetonide 40 mg/mL    Patient tolerance: patient tolerated the procedure well with no immediate complications  Dressing:  Sterile dressing applied            Conservative treatment:    Ice to shoulder 1-2 times daily, for 20 minutes at a time. PT for ROM and strengthening to shoulder, rotator cuff, scapular stabilizers. Home exercise program for shoulder, including ROM and strenthening. Instructions given to patient of what exercises to perform.   Let pain guide return to activities. Referral to spine and pain was provided for cervical radiculopathy referring pain to right upper extremity. Imaging: All imaging from today was reviewed by myself and explained to the patient. Injection:    The risks and benefits of the injection (which include but are not limited to: infection, bleeding,damage to nerve/artery, need for further intervention), as well as the risks and benefits of all alternative treatments were explained and understood. The patient elected to proceed with injection. The procedure was done with aseptic technique, and the patient tolerated the procedure well with no complications. A corticosteroid injection of the subacromial space was performed. The patient should take 1-2 days off of activity, with gradual return to activity as tolerated. Ice to the shoulder 1-2 times daily for 20 minutes, for next 24-48 hrs. Surgery:     I did recommend surgical intervention of a left shoulder arthroscopy with rotator cuff repair, possible subacromial decompression, possible open subpectoral biceps tenodesis versus tenotomy, and all associated procedures. However, he wishes to hold off on surgical intervention as he does not believe he can take the time off of work. Follow up:    Return in about 2 months (around 9/21/2023) for Recheck. 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 In regards to his left shoulder he states he has been having ongoing left shoulder pain now for several months now without any specific injury or trauma. He states that his pain is predominantly on the anterior and lateral aspect of his shoulder, but does not radiate past his elbow.   He describes his pain as dull and achy that transitions as sharp and severe with repetitive overhead motion as well as reaching behind his back. He rates his pain at rest a 3 out of 10 that does occasionally wake him up at night, that does also exacerbate to a 7 out of 10 with reaching overhead as well as behind his back. He has attempted a brief period of outpatient physical therapy as well as a home exercise program in regards to his left shoulder. He has not attempted any cortisone injections for his left shoulder, however he does have interest in them as well as possible PRP injection. He is currently attempting manage pain with ice and over-the-counter medication with mild relief of his symptoms. He denies any previous history of injury or trauma to his shoulder. He denies any numbness or tingling. In regards to his right shoulder he states that his pain is diffusely throughout his right upper extremity that does radiate past his elbow and into his hand. He states that the pain improves with reaching overhead. He does occasionally exhibit a numbness and tingling sensation and does have a prior history of cervical disc degeneration. He has not seen any provider that specializes in spine or pain and has been attempting to manage this with his primary care provider. Shoulder Surgical History:  None    Past Medical, Social and Family History:  Past Medical History:   Diagnosis Date   • Arthritis    • Contact lens/glasses fitting    • Disease of thyroid gland     hypo   • Dry skin     hands/feet   • GERD (gastroesophageal reflux disease)    • Vitamin D deficiency      Past Surgical History:   Procedure Laterality Date   • COLONOSCOPY N/A 12/22/2017    Procedure: COLONOSCOPY;  Surgeon: Steve Villalobos MD;  Location: [address] [place] [address] GI LAB; Service: Gastroenterology   • ESOPHAGOGASTRODUODENOSCOPY N/A 12/22/2017    Procedure: ESOPHAGOGASTRODUODENOSCOPY (EGD); Surgeon: Steve Villalobos MD;  Location: [facility] GI LAB;   Service: Gastroenterology   • NV EXC LESION TDN SHTH/JT CAPSL HAND/FNGR Left 2022    Procedure: EXCISION GANGLION CYST - left index finger osteophyte excision;  Surgeon: Varsha Martinez MD;  [facility] MAIN OR;  Service: Orthopedics     No Known Allergies  Current Outpatient Medications on File Prior to Visit   Medication Sig Dispense Refill   • hydrOXYzine pamoate (VISTARIL) 25 mg capsule TAKE 1 CAPSULE (25 MG TOTAL) BY MOUTH 2 (TWO) TIMES A DAY AS NEEDED FOR ITCHING 180 capsule 1   • levothyroxine (Synthroid) 75 mcg tablet Take 1 tablet (75 mcg total) by mouth daily in the early morning 90 tablet 1   • meloxicam (MOBIC) 15 mg tablet Take 1 tablet (15 mg total) by mouth daily 90 tablet 1   • montelukast (SINGULAIR) 10 mg tablet Take 1 tablet (10 mg total) by mouth daily at bedtime 90 tablet 1   • tamsulosin (FLOMAX) 0.4 mg Take 2 pills AT bedtime 180 capsule 1   • terbinafine (LamISIL) 250 mg tablet TAKE 1 TABLET (250 MG) BY MOUTH DAILY X 21 DAYS       No current facility-administered medications on file prior to visit.      Social History     Socioeconomic History   • Marital status: Single     Spouse name: Not on file   • Number of children: Not on file   • Years of education: Not on file   • Highest education level: Not on file   Occupational History   • Not on file   Tobacco Use   • Smoking status: Every Day     Packs/day: 0.50     Years: 20.00     Total pack years: 10.00     Types: Cigarettes     Last attempt to quit: 10/30/2022     Years since quittin.7   • Smokeless tobacco: Never   Vaping Use   • Vaping Use: Never used   Substance and Sexual Activity   • Alcohol use: Not Currently     Comment: socially   • Drug use: No   • Sexual activity: Not on file   Other Topics Concern   • Not on file   Social History Narrative    Feels safe at home     Social Determinants of Health     Financial Resource Strain: Not on file   Food Insecurity: Not on file   Transportation Needs: Not on file   Physical Activity: Not on file   Stress: Not on file   Social Connections: Not on file   Intimate Partner Violence: Not on file   Housing Stability: Not on file         I have reviewed the past medical, surgical, social and family history, medications and allergies as documented in the EMR. Review of systems: ROS is negative other than that noted in the HPI. Constitutional: Negative for fatigue and fever. HENT: Negative for sore throat. Respiratory: Negative for shortness of breath. Cardiovascular: Negative for chest pain. Gastrointestinal: Negative for abdominal pain. Endocrine: Negative for cold intolerance and heat intolerance. Genitourinary: Negative for flank pain. Musculoskeletal: Negative for back pain. Skin: Negative for rash. Allergic/Immunologic: Negative for immunocompromised state. Neurological: Negative for dizziness. Psychiatric/Behavioral: Negative for agitation. Physical Exam:    Blood pressure 162/95, pulse 72. General/Constitutional: NAD, well developed, well nourished  HENT: Normocephalic, atraumatic  CV: Intact distal pulses, regular rate  Resp: No respiratory distress or labored breathing  Lymphatic: No lymphadenopathy palpated  Neuro: Alert and Oriented x 3, no focal deficits  Psych: Normal mood, normal affect, normal judgement, normal behavior  Skin: Warm, dry, no rashes, no erythema      Left Shoulder Exam  Alignment / Posture:  Normal shoulder posture. Inspection:  No swelling. No edema. No erythema. No ecchymosis. Minimal muscle atrophy. Palpation:  Mild subacromial tenderness. No effusion. No warmth. No crepitus. No clicking, catching, or snapping. ROM:  Shoulder . Shoulder ER 30. Shoulder IR Hip. Strength:  Supraspinatus 4/5. Infraspinatus 4/5. Subscapularis 4/5. Stability:  No objective shoulder instability. Tests: (+) [place]cal[place]. (+) Neer. (+) Painful arc. (+) Speed. (-) Belly press. (-) [place].   Neurovascular:  Sensation intact in Ax/R/M/U nerve distributions. 2+ radial pulse. Right Shoulder Exam  Alignment / Posture:  Normal shoulder posture. Inspection:  No swelling. No edema. No erythema. No ecchymosis. Minimal muscle atrophy. No deformity. Palpation:  Upper trap and josé miguel-scapular tenderness. No effusion. No warmth. No crepitus. No clicking, catching, or snapping. ROM:  Shoulder . Shoulder ER 40. Shoulder IR L1. Strength:  Supraspinatus 4+/5. Infraspinatus 4+/5. Subscapularis 4+/5. Stability:  No objective shoulder instability. Tests: (+) Metcalf. (+) Neer. (+) Speed. (+) [place]. (-) Belly press. Neurovascular:  Sensation intact in Ax/R/M/U nerve distributions. 2+ radial pulse. UE NV Exam: +2 Radial pulses bilaterally  Sensation intact to light touch C5-T1 bilaterally, Radial/median/ulnar nerve motor intact      Bilateral elbow, wrist, and and forearm ROM full, painless with passive ROM, no ttp or crepitance throughout extremities below shoulder joint    Cervical ROM is full without pain, numbness or tingling      Shoulder Imaging    X-rays of the left shoulder were reviewed, which demonstrate moderate acromioclavicular and mild glenohumeral joint osteoarthritis. I have reviewed the radiology report and agree with their impression. MRI of the left shoulder were reviewed, which demonstrate complete full-thickness supraspinatus tendon with mild fatty infiltration retraction to the superior glenoid. There is also evidence of severe acromioclavicular and mild glenohumeral joint osteoarthritis. I have reviewed the radiology report and agree with their impression.       Scribe Attestation    I,:  Jorge Draper am acting as a scribe while in the presence of the attending physician.:       I,:  Mariam Lopez, DO personally performed the services described in this documentation    as scribed in my presence.:

## 2023-08-17 ENCOUNTER — TELEMEDICINE (OUTPATIENT)
Dept: INTERNAL MEDICINE CLINIC | Facility: CLINIC | Age: 58
End: 2023-08-17
Payer: COMMERCIAL

## 2023-08-17 VITALS — TEMPERATURE: 98 F | HEIGHT: 65 IN | BODY MASS INDEX: 37.99 KG/M2 | WEIGHT: 228 LBS

## 2023-08-17 DIAGNOSIS — U07.1 COVID-19: Primary | ICD-10-CM

## 2023-08-17 DIAGNOSIS — J20.9 ACUTE TRACHEOBRONCHITIS: ICD-10-CM

## 2023-08-17 DIAGNOSIS — I10 PRIMARY HYPERTENSION: ICD-10-CM

## 2023-08-17 PROCEDURE — 99213 OFFICE O/P EST LOW 20 MIN: CPT | Performed by: INTERNAL MEDICINE

## 2023-08-17 RX ORDER — GUAIFENESIN AND CODEINE PHOSPHATE 100; 10 MG/5ML; MG/5ML
10 SOLUTION ORAL 3 TIMES DAILY PRN
Qty: 180 ML | Refills: 0 | Status: SHIPPED | OUTPATIENT
Start: 2023-08-17

## 2023-08-17 RX ORDER — NIRMATRELVIR AND RITONAVIR 300-100 MG
3 KIT ORAL 2 TIMES DAILY
Qty: 30 TABLET | Refills: 0 | Status: SHIPPED | OUTPATIENT
Start: 2023-08-17 | End: 2023-08-17

## 2023-08-17 RX ORDER — NIRMATRELVIR AND RITONAVIR 300-100 MG
3 KIT ORAL 2 TIMES DAILY
Qty: 30 TABLET | Refills: 0 | Status: SHIPPED | OUTPATIENT
Start: 2023-08-17 | End: 2023-08-22

## 2023-08-17 NOTE — PATIENT INSTRUCTIONS
COVID-19 and Chronic Health Conditions   AMBULATORY CARE:   What you need to know about coronavirus disease 2019 (COVID-19) and chronic health conditions: Your chronic condition may increase your risk for COVID-19 or serious problems it can cause. Healthcare providers might need to make changes that affect how you usually manage your chronic health condition. Providers may change hours of operation or not have patients come in to be seen. You may not be able to make appointments to get blood drawn or to have tests or procedures. This may continue until the virus that causes COVID-19 is controlled. Until then, you can take steps to manage your condition. The steps will also lower your risk for COVID-19 or the serious problems it causes. If you do develop COVID-19, healthcare providers will tell you when it is okay to be around others after you recover. This depends on your chronic condition, any symptoms of COVID-19 that developed, and how severe the symptoms were. What you need to know about serious problems from the virus:  You may develop long-term health problems caused by the virus. Your risk is higher if you are 65 or older. A weak immune system, obesity, diabetes, chronic kidney disease, or a heart or lung condition can also increase your risk. Your risk is also higher if you are a current or former cigarette smoker.  COVID-19 can lead to any of the following:  Multisymptom inflammatory syndrome in adults (MIS-A) or in children (MIS-C), causing inflammation in the heart, digestive system, skin, or brain    Shortness of breath, serious lower respiratory conditions, such as pneumonia or acute respiratory distress syndrome (ARDS)    Blood clots or blood vessel damage    Organ damage from a lack of oxygen or from blood clots    Sleep problems    Problems thinking clearly, remembering information, or concentrating    Mood changes, depression, or anxiety    Long-term problems tasting or smelling    Loss of appetite and weight loss    Nerve pain    Fatigue (feeling mentally and physically tired)    Call your local emergency number (911 in the [address]) if:   You have trouble breathing or shortness of breath. You have chest pain or pressure that lasts longer than 5 minutes. You become confused or hard to wake. Your lips or face are blue. Seek care immediately if:   You have a fever of 104°F (40°C) or higher. Call your doctor or healthcare provider if:   You have symptoms of COVID-19. You have questions or concerns about COVID-19 or your chronic condition. How the 2019 coronavirus spreads: The virus spreads quickly and easily. The virus can be passed starting 2 to 3 days before symptoms begin or before a positive test if symptoms never begin. Droplets are the main way all coronaviruses spread. The virus travels in droplets that form when a person talks, sings, coughs, or sneezes. The droplets can also float in the air for minutes or hours. Infection happens when you breathe in the droplets or get them in your eyes or nose. Close personal contact with an infected person increases your risk for infection. This means being within 6 feet (2 meters) of the person for at least 15 minutes over 24 hours. Person-to-person contact can spread the virus. For example, a person with the virus on his or her hands can spread it by shaking hands with someone. The virus can stay on objects and surfaces for up to 3 days. You may become infected by touching the object or surface and then touching your eyes or mouth. What you need to know about the signs and symptoms of COVID-19:  Signs and symptoms usually start about 5 days after infection but can take 2 to 14 days. Signs and symptoms range from mild to severe. You may feel like you have the flu or a bad cold. Your chronic health condition may cause some of the same symptoms COVID-19 causes.  This can make it hard to know if a symptom is from COVID-19 or your chronic condition. Keep a record of any new or worsening symptom you have. This is especially important if you have a condition that often causes shortness of breath. Your provider can tell you if you should be tested for COVID-19. Tell your healthcare provider if you think you were infected but develop signs or symptoms not listed below:  A cough    Shortness of breath or trouble breathing that may become severe    A fever of at least 100.4°F, or 38°C (may be lower in adults 65 or older)    Chills that might include shaking    Muscle pain, body aches, or a headache    A sore throat    Suddenly not being able to taste or smell anything    Feeling very tired (fatigue)    Congestion (stuffy head and nose), or a runny nose    Diarrhea, nausea, or vomiting    Manage your chronic health condition during this time:  If you do not have a regular healthcare provider, experts recommend you contact a local [facility] health center or health department. The following can help you manage your condition and prevent COVID-19:  Get emergency care for your condition if needed. Talk to your healthcare providers about symptoms of your chronic condition that need immediate care. Your providers can help you create a plan or add exacerbation management to your plan. The plan will include when to go to an emergency department and when to call your local emergency number. This will depend on where you live and the services that are available during this time. Go to dialysis appointments as scheduled. It is important to stay on schedule. You will need to have enough food to be able to follow the emergency diet plan if you must miss a session. The emergency diet needs to be part of the management plan for your condition. Reschedule any upcoming appointments as needed. Medical facilities may be closed until the coronavirus is better controlled. This means you may need to reschedule a surgery, procedure, or check-up appointment.  If you cannot have a phone or video appointment, you will need to make a new appointment. Some providers may be scheduling appointments several months in advance. Some surgeries and procedures will happen as scheduled. This depends on the medical condition and the reason for the surgery or procedure. You may need to have extra testing for COVID-19 several days before. Follow any regular management plan you use. Your healthcare provider will tell you if you need to make any changes to your regular management plan. For example, if you have asthma, continue to follow your asthma action plan. If you have diabetes, you may need to check your blood sugar level more often. Stress and illness can make blood sugar levels go up. You may need to adjust medicine such as insulin. If you have a heart condition or high blood pressure, you may need to check your blood pressure more often. Stress and illness can also raise your blood pressure. Talk to your healthcare providers about your medicines. You may be able to get more than 1 month of medicine at a time. This will lower the number of times you need to go to a pharmacy to get your medicines. Make sure you have enough medicine if you have a condition that can lead to an emergency. Examples include asthma medicines, insulin, or an epinephrine pen. Check the expiration dates on the medicines you currently have. Ask for refills as soon as possible, if needed. If it is not time to refill prescriptions, you may be able to get an emergency supply of some medicines. Medicine plans vary, so ask your healthcare provider or pharmacist for options. Have supplies available in your home. If possible, get extra supplies you use regularly. Examples include absorbent pads, syringes, and wound cleaning solutions. This will limit the number of trips out of your home to get supplies.     What you can do to prevent having to go out of your home during this time:   Ask your healthcare provider for other ways to have appointments. Some providers offer phone, video, or other types of appointments. Have food, medicines, and other supplies delivered. Some pharmacies can send certain medicines to you through the mail. Grocery stores and restaurants may be able to deliver food and other items. If possible, have delivered items placed somewhere. Try not to have someone hand you an item. You will be so close to the person that the virus can spread between you. Ask someone to get items you need. The person can get groceries, medicines, or other needed items for you. Choose a person who does not have signs or symptoms of COVID-19 or has tested negative for it. The person should not be waiting for test results. He or she should not have a condition that increases the risk for COVID-19 or serious problems it causes. What you need to know about COVID-19 vaccines: Your healthcare provider can give you more information about what to expect, depending on your chronic condition. You are considered fully vaccinated against COVID-19 two weeks after the final dose of any COVID-19 vaccine. Let your healthcare provider know when you have received the final dose of the vaccine. Make a copy of your vaccination card. Keep the original with you in case you need to show it. Keep the copy in a safe place. Get a COVID-19 vaccine as directed. Vaccination is recommended for everyone 6 months or older. COVID-19 vaccines are given as a shot in 1 to 3 doses as a primary series. This depends on the vaccine brand and the age of the person who receives it. A booster dose is recommended for everyone 5 years or older after the primary series is complete. A second booster  is recommended for all adults 48 or older and for immunocompromised adolescents. The second booster is also recommended for anyone who got the 1-dose brand of vaccine for the first dose and a booster.  Your provider can give you more information on boosters and help you schedule all needed doses. Continue social distancing and other measures. You can become infected even after you get the vaccine. You may also be able to pass the virus to others without knowing you are infected. After you get the vaccine, check local, national, and international travel rules. You may need to be tested before you travel. Some countries require proof of a negative test before you travel. You may also need to quarantine after you return. Medicine may be given to prevent infection. The medicine can be given if you are at high risk for infection and cannot get the vaccine. It can also be given if your immune system does not respond well to the vaccine. Lower your risk for COVID-19:   Wash your hands often throughout the day. Use soap and water. Rub your soapy hands together, lacing your fingers, for at least 20 seconds. Rinse with warm, running water. Dry your hands with a clean towel or paper towel. Use hand  that contains alcohol if soap and water are not available. Teach children how to wash their hands and use hand . Cover sneezes and coughs. Turn your face away and cover your mouth and nose with a tissue. Throw the tissue away. Use the bend of your arm if a tissue is not available. Then wash your hands with soap and water or use hand . Teach children how to cover a cough or sneeze. Follow worldwide, national, and local social distancing guidelines. Keep at least 6 feet (2 meters) between you and others. Wear a face covering (mask) around anyone who does not live in your home. Use a cloth covering with at least 2 layers. You can also create layers by putting a cloth covering over a disposable non-medical mask. Cover your mouth and your nose. Try not to touch your face. If you get the virus on your hands, you can transfer it to your eyes, nose, or mouth and become infected.  You can also transfer it to objects, surfaces, or people. Clean and disinfect high-touch surfaces and objects in your home often. Use disinfecting wipes, or make a solution by mixing 4 teaspoons of bleach with 1 quart (4 cups) of water. Do not  use any cleaning or disinfecting products that can trigger an asthma attack or other breathing problems. Open windows or have circulating air as you clean. Do not  mix ammonia with bleach. This will create toxic fumes. How to follow social distancing guidelines:  National and local social distancing rules vary. Rules and restrictions may change over time as restrictions are lifted. The following are general guidelines:  Stay home if you are sick or think you may have COVID-19. It is important to stay home if you are waiting for a testing appointment or for test results. Avoid close physical contact with anyone who does not live in your home. Do not shake hands with, hug, or kiss a person as a greeting. If you must use public transportation (such as a bus or subway), try to sit or stand away from others. Wear your face covering. Avoid in-person gatherings and crowds. Attend virtually if possible. Help strengthen your immune system:   Ask about other vaccines you may need. Get the influenza (flu) vaccine as soon as recommended each year, usually starting in September or October. Get the pneumonia vaccine if recommended. Your healthcare provider can tell you if you should also get other vaccines, and when to get them. Do not smoke. Nicotine and other chemicals in cigarettes and cigars can increase your risk for infection and for serious COVID-19 effects. Ask your healthcare provider for information if you currently smoke and need help to quit. E-cigarettes or smokeless tobacco still contain nicotine. Talk to your healthcare provider before you use these products. Eat a variety of healthy foods.   Examples include vegetables, fruits, whole-grain breads and cereals, lean meats and poultry, fish, low-fat dairy products, and cooked beans. Healthy foods contain nutrients that help keep your immune system strong. Find ways to manage stress. You may be feeling more stressed than usual because of the COVID-19 outbreak. The situation is very stressful to many people. Talk to your healthcare providers about ways to manage stress during this time. Stress can lead to breathing problems or trigger an attack or exacerbation of many health conditions. Pick 1 or 2 times a day to watch the news. Constant news watching can increase your stress levels. Instead, do things you enjoy. Talk to a friend or go for a walk together. Read a book or magazine. Take a warm bath or listen to soothing music. Follow up with your doctor or healthcare provider as directed: Your providers will tell you when you can come in for tests, procedures, or check-ups. Bring your symptom record with you to all appointments. Write down your questions so you remember to ask them during your visits. For more information:   [facility]  [address]  [place]  Phone: 0- 196 - 1426868  Phone: 7- 294 - 3006581  Web Address: [employer]    © Copyright [employer] 2022 Information is for End User's use only and may not be sold, redistributed or otherwise used for commercial purposes. The above information is an  only. It is not intended as medical advice for individual conditions or treatments. Talk to your doctor, nurse or pharmacist before following any medical regimen to see if it is safe and effective for you.

## 2023-08-17 NOTE — ASSESSMENT & PLAN NOTE
Patient having symptoms for 3 days cough congestion scanty expectoration headache nasal congestion sore throat fever chills no chest pain no difficulty breathing no nausea vomiting tested positive today for COVID-19 treated with Paxlovid explained the side effect at length including metallic taste nausea vomiting abdominal pain and sometimes altered mental status along with vitamin C vitamin D zinc and cough medicine codeine plenty of fluid and also counseling done for the precaution

## 2023-08-17 NOTE — ASSESSMENT & PLAN NOTE
Symptomatic treatment with cough medicine with codeine decongestant plenty of fluid and treatment for COVID-19 same as under COVID-19

## 2023-08-17 NOTE — ASSESSMENT & PLAN NOTE
Advised to monitoring blood pressure at home at time been controlled with low-salt diet advise if blood pressure is high she will call us in the office

## 2023-08-17 NOTE — PROGRESS NOTES
COVID-19 Outpatient Progress Note    Assessment/Plan:    Problem List Items Addressed This Visit        Respiratory    Acute tracheobronchitis     Symptomatic treatment with cough medicine with codeine decongestant plenty of fluid and treatment for COVID-19 same as under COVID-19         Relevant Medications    guaifenesin-codeine (GUAIFENESIN AC) 100-10 MG/5ML liquid    nirmatrelvir & ritonavir (Paxlovid, 300/100,) tablet therapy pack       Cardiovascular and Mediastinum    Primary hypertension     Advised to monitoring blood pressure at home at time been controlled with low-salt diet advise if blood pressure is high she will call us in the office            Other    COVID-19 - Primary     Patient having symptoms for 3 days cough congestion scanty expectoration headache nasal congestion sore throat fever chills no chest pain no difficulty breathing no nausea vomiting tested positive today for COVID-19 treated with Paxlovid explained the side effect at length including metallic taste nausea vomiting abdominal pain and sometimes altered mental status along with vitamin C vitamin D zinc and cough medicine codeine plenty of fluid and also counseling done for the precaution         Relevant Medications    guaifenesin-codeine (GUAIFENESIN AC) 100-10 MG/5ML liquid    nirmatrelvir & ritonavir (Paxlovid, 300/100,) tablet therapy pack   Patient having symptoms of cough congestion fever chills for last 2 to 3 days scanty expectoration headache nasal congestion intractable coughing, choking or sleep at nighttime tested positive todayBeing treated with Paxlovid's zinc vitamin C vitamin D for details see under visit diagnosis  Disposition:     Discussed symptom directed medication options with patient. Discussed vitamin D, vitamin C, and/or zinc supplementation with patient. Patient meets criteria for PAXLOVID and they have been counseled appropriately according to EUA documentation released by the FDA.  After discussion, patient agrees to treatment. Cameron Felder is an investigational medicine used to treat mild-to-moderate COVID-19 in adults and children (15years of age and older weighing at least 80 pounds (40 kg)) with positive results of direct SARS-CoV-2 viral testing, and who are at high risk for progression to severe COVID-19, including hospitalization or death. PAXLOVID is investigational because it is still being studied. There is limited information about the safety and effectiveness of using PAXLOVID to treat people with mild-to-moderate COVID-19. The FDA has authorized the emergency use of PAXLOVID for the treatment of mild-tomoderate COVID-19 in adults and children (15years of age and older weighing at least 80 pounds (40 kg)) with a positive test for the virus that causes COVID-19, and who are at high risk for progression to severe COVID-19, including hospitalization or death, under an EUA. What should I tell my healthcare provider before I take PAXLOVID? Tell your healthcare provider if you:  - Have any allergies  - Have liver or kidney disease  - Are pregnant or plan to become pregnant  - Are breastfeeding a child  - Have any serious illnesses    Tell your healthcare provider about all the medicines you take, including prescription and over-the-counter medicines, vitamins, and herbal supplements. Some medicines may interact with PAXLOVID and may cause serious side effects. Keep a list of your medicines to show your healthcare provider and pharmacist when you get a new medicine. You can ask your healthcare provider or pharmacist for a list of medicines that interact with PAXLOVID. Do not start taking a new medicine without telling your healthcare provider. Your healthcare provider can tell you if it is safe to take PAXLOVID with other medicines. Tell your healthcare provider if you are taking combined hormonal contraceptive. PAXLOVID may affect how your birth control pills work.  Females who are able to become pregnant should use another effective alternative form of contraception or an additional barrier method of contraception. Talk to your healthcare provider if you have any questions about contraceptive methods that might be right for you. How do I take PAXLOVID? PAXLOVID consists of 2 medicines: nirmatrelvir and ritonavir. - Take 2 pink tablets of nirmatrelvir with 1 white tablet of ritonavir by mouth 2 times each day (in the morning and in the evening) for 5 days. For each dose, take all 3 tablets at the same time. - If you have kidney disease, talk to your healthcare provider. You may need a different dose. - Swallow the tablets whole. Do not chew, break, or crush the tablets  - Take PAXLOVID with or without food. - Do not stop taking PAXLOVID without talking to your healthcare provider, even if you feel better. - If you miss a dose of PAXLOVID within 8 hours of the time it is usually taken, take it as soon as you remember. If you miss a dose by more than 8 hours, skip the missed dose and take the next dose at your regular time. Do not take 2 doses of PAXLOVID at the same time. - If you take too much PAXLOVID, call your healthcare provider or go to the nearest hospital emergency room right away. - If you are taking a ritonavir- or cobicistat-containing medicine to treat hepatitis C or Human Immunodeficiency Virus (HIV), you should continue to take your medicine as prescribed by your healthcare provider.  - Talk to your healthcare provider if you do not feel better or if you feel worse after 5 days. Who should generally not take PAXLOVID? Do not take PAXLOVID if:  You are allergic to nirmatrelvir, ritonavir, or any of the ingredients in PAXLOVID.     You are taking any of the following medicines:  - Alfuzosin  - Pethidine, piroxicam, propoxyphene  - Ranolazine  - Amiodarone, dronedarone, flecainide, propafenone, quinidine  - Colchicine  - Lurasidone, pimozide, clozapine  - Dihydroergotamine, ergotamine, methylergonovine  - Lovastatin, simvastatin  - Sildenafil (Revatio®) for pulmonary arterial hypertension (PAH)  - Triazolam, oral midazolam  - Apalutamide  - Carbamazepine, phenobarbital, phenytoin  - Rifampin  - Africa’s Wort (hypericum perforatum)    What are the important possible side effects of PAXLOVID? Possible side effects of PAXLOVID are:  - Liver Problems. Tell your healthcare provider right away if you have any of these signs and symptoms of liver problems: loss of appetite, yellowing of your skin and the whites of eyes (jaundice), dark-colored urine, pale colored stools and itchy skin, stomach area (abdominal) pain. - Resistance to HIV Medicines. If you have untreated HIV infection, PAXLOVID may lead to some HIV medicines not working as well in the future. - Other possible side effects include: altered sense of taste, diarrhea, high blood pressure, or muscle aches    These are not all the possible side effects of PAXLOVID. Not many people have taken PAXLOVID. Serious and unexpected side effects may happen. Norwood Seip is still being studied, so it is possible that all of the risks are not known at this time. What other treatment choices are there? Like Jose Lopez may allow for the emergency use of other medicines to treat people with COVID-19. Go to https://Fashism/ for information on the emergency use of other medicines that are authorized by FDA to treat people with COVID-19. Your healthcare provider may talk with you about clinical trials for which you may be eligible. It is your choice to be treated or not to be treated with PAXLOVID. Should you decide not to receive it or for your child not to receive it, it will not change your standard medical care. What if I am pregnant or breastfeeding?     There is no experience treating pregnant women or breastfeeding mothers with PAXLOVID. For a mother and unborn baby, the benefit of taking PAXLOVID may be greater than the risk from the treatment. If you are pregnant, discuss your options and specific situation with your healthcare provider. It is recommended that you use effective barrier contraception or do not have sexual activity while taking PAXLOVID. If you are breastfeeding, discuss your options and specific situation with your healthcare provider. How do I report side effects with PAXLOVID? Contact your healthcare provider if you have any side effects that bother you or do not go away. Report side effects to [employer] at www.fda.gov/medwatch or call 1-555-HGX9001 or you can report side effects to [facility]. at the contact information provided below. Website Fax number Telephone number   [facility] [phone]     How should I store [address] [place] [address]? Store PAXLOVID tablets at room temperature between 68°F to 77°F (20°C to 25°C). Full fact sheet for patients, parents, and caregivers can be found at: Fitfully.co.za    I have spent a total time of 15 minutes on the day of the encounter for this patient including discussing prognosis, risks and benefits of treatment options, instructions for management, importance of treatment compliance, risk factor reductions and documenting in the medical record. Encounter provider: Thien Smith MD     Provider located at: [address] [facility] INTERNAL MEDICINE  [address] [facility]  [address] [address] [address] [facility]     Recent Visits  No visits were found meeting these conditions.   Showing recent visits within past 7 days and meeting all other requirements  Today's Visits  Date Type Provider Dept   08/17/23 Telemedicine Thien Smith MD [facility] Internal Med   Showing today's visits and meeting all other requirements  Future Appointments  No visits were found meeting these conditions. Showing future appointments within next 150 days and meeting all other requirements     This virtual check-in was done via [facility] and patient was informed that this is a secure, HIPAA-compliant platform. He agrees to proceed. Patient agrees to participate in a virtual check in via telephone or video visit instead of presenting to the office to address urgent/immediate medical needs. Patient is aware this is a billable service. He acknowledged consent and understanding of privacy and security of the video platform. The patient has agreed to participate and understands they can discontinue the visit at any time. After connecting through [facility], the patient was identified by name and date of birth. Mini Church was informed that this was a telemedicine visit and that the exam was being conducted confidentially over secure lines. Mini Church acknowledged consent and understanding of privacy and security of the telemedicine visit. I informed the patient that I have reviewed his record in Epic and presented the opportunity for him to ask any questions regarding the visit today. The patient agreed to participate. Verification of patient location:  Patient is located in the following state in which I hold an active license: [place]    Subjective:   Mini Church is a 63 y.o. male who is concerned about COVID-19. Patient's symptoms include fever, chills, nasal congestion, rhinorrhea, sore throat and cough.          COVID-19 vaccination status: Partially vaccinated    Exposure:   Contact with a person who is under investigation (PUI) for or who is positive for COVID-19 within the last 14 days?: No    Hospitalized recently for fever and/or lower respiratory symptoms?: No      Currently a healthcare worker that is involved in direct patient care?: No      Works in a special setting where the risk of COVID-19 transmission may be high? (this may include long-term care, correctional and longterm facilities; homeless shelters; assisted-living facilities and group homes.): No      Resident in a special setting where the risk of COVID-19 transmission may be high? (this may include long-term care, correctional and longterm facilities; homeless shelters; assisted-living facilities and group homes.): No      No results found for: "[address] [facility]", "[address] [facility]", "[address] [facility]", "[facility]", "[address] [facility]", "[address] [facility] [facility] [place]"    Review of Systems   Constitutional: Positive for chills and fever. HENT: Positive for congestion, rhinorrhea and sore throat. Respiratory: Positive for cough. Current Outpatient Medications on File Prior to Visit   Medication Sig   • hydrOXYzine pamoate (VISTARIL) 25 mg capsule TAKE 1 CAPSULE (25 MG TOTAL) BY MOUTH 2 (TWO) TIMES A DAY AS NEEDED FOR ITCHING   • levothyroxine (Synthroid) 75 mcg tablet Take 1 tablet (75 mcg total) by mouth daily in the early morning   • meloxicam (MOBIC) 15 mg tablet Take 1 tablet (15 mg total) by mouth daily   • montelukast (SINGULAIR) 10 mg tablet Take 1 tablet (10 mg total) by mouth daily at bedtime   • tamsulosin (FLOMAX) 0.4 mg Take 2 pills AT bedtime   • terbinafine (LamISIL) 250 mg tablet TAKE 1 TABLET (250 MG) BY MOUTH DAILY X 21 DAYS       Objective:    Temp 98 °F (36.7 °C)   Ht 5' 5" (1.651 m)   Wt 103 kg (228 lb)   BMI 37.94 kg/m²        Physical Exam  Neurological:      Mental Status: He is oriented to person, place, and time.        Valencia Zamarripa MD

## 2023-08-31 ENCOUNTER — OFFICE VISIT (OUTPATIENT)
Dept: INTERNAL MEDICINE CLINIC | Facility: CLINIC | Age: 58
End: 2023-08-31
Payer: COMMERCIAL

## 2023-08-31 VITALS
OXYGEN SATURATION: 96 % | BODY MASS INDEX: 38.59 KG/M2 | HEIGHT: 65 IN | RESPIRATION RATE: 16 BRPM | DIASTOLIC BLOOD PRESSURE: 80 MMHG | TEMPERATURE: 98 F | WEIGHT: 231.6 LBS | HEART RATE: 76 BPM | SYSTOLIC BLOOD PRESSURE: 136 MMHG

## 2023-08-31 DIAGNOSIS — I10 PRIMARY HYPERTENSION: ICD-10-CM

## 2023-08-31 DIAGNOSIS — B35.1 ONYCHOMYCOSIS: ICD-10-CM

## 2023-08-31 DIAGNOSIS — M54.12 CERVICAL RADICULOPATHY: Primary | ICD-10-CM

## 2023-08-31 DIAGNOSIS — U07.1 COVID-19: ICD-10-CM

## 2023-08-31 DIAGNOSIS — J45.991 COUGH VARIANT ASTHMA: ICD-10-CM

## 2023-08-31 PROCEDURE — 99213 OFFICE O/P EST LOW 20 MIN: CPT | Performed by: INTERNAL MEDICINE

## 2023-08-31 RX ORDER — TERBINAFINE HYDROCHLORIDE 250 MG/1
250 TABLET ORAL DAILY
Qty: 30 TABLET | Refills: 1 | Status: SHIPPED | OUTPATIENT
Start: 2023-08-31 | End: 2023-09-30

## 2023-08-31 RX ORDER — METHYLPREDNISOLONE 4 MG/1
TABLET ORAL
Qty: 21 EACH | Refills: 0 | Status: SHIPPED | OUTPATIENT
Start: 2023-08-31

## 2023-08-31 NOTE — ASSESSMENT & PLAN NOTE
Patient also had itching persistent sole of the foot and a palm which is much improved with a course of Lamisil persistent onychomycosis will continue Lamisil awaiting to be seen by podiatrist

## 2023-08-31 NOTE — PROGRESS NOTES
BMI Counseling: Body mass index is 38.54 kg/m². The BMI is above normal. Nutrition recommendations include decreasing portion sizes, encouraging healthy choices of fruits and vegetables, decreasing fast food intake, consuming healthier snacks, limiting drinks that contain sugar, moderation in carbohydrate intake, increasing intake of lean protein, reducing intake of saturated and trans fat and reducing intake of cholesterol. Exercise recommendations include moderate physical activity 150 minutes/week and exercising 3-5 times per week. No pharmacotherapy was ordered. Rationale for BMI follow-up plan is due to patient being overweight or obese. Tobacco Cessation Counseling: Tobacco cessation counseling was provided. The patient is sincerely urged to quit consumption of tobacco. He is not ready to quit tobacco.       Dr. Godwin Arce Office Visit Note  23     Ricky Chino 63 y.o. male MRN: [id number]  : 1965    Assessment:     1. Cervical radiculopathy  Assessment & Plan:  Pain in cervical spine radiating right upper extremity with tingling numbness to be seen by pain management as requested by the patient continue meloxicam once a day after finishing Medrol Dosepak awaiting to be seen by pain management as patient requesting    Orders:  -     methylPREDNISolone 4 MG tablet therapy pack; Use as directed on package  -     Ambulatory referral to Spine & Pain Management; Future    2. COVID-19  Assessment & Plan:  Treated with Paxlovid persistent coughing tested negative yesterday no difficulty breathing no fever chills chest pain persistent coughing slowly improving will give course of Medrol Dosepak if persistent coughing we will do chest CT of the chest was advised    Orders:  -     methylPREDNISolone 4 MG tablet therapy pack; Use as directed on package    3.  Onychomycosis  Assessment & Plan:  Patient also had itching persistent sole of the foot and a palm which is much improved with a course of Lamisil persistent onychomycosis will continue Lamisil awaiting to be seen by podiatrist    Orders:  -     terbinafine (LamISIL) 250 mg tablet; Take 1 tablet (250 mg total) by mouth daily    4. Cough variant asthma  Assessment & Plan:  Continue singular no wheezing persistent coughing persist will start on Azmacort patient reluctant to use Azmacort      5. Primary hypertension  Assessment & Plan:  Patient's blood pressure seems to be controlled continue low-salt diet and frequent blood pressure monitoring            Discussion Summary and Plan: Today's care plan and medications were reviewed with patient in detail and all their questions answered to their satisfaction. Chief Complaint   Patient presents with   • Follow-up     Just got over covid. Still urinating a lot. Problem with hands and feet itching. Subjective:  Came in for follow-up chronic medical condition COVID symptoms improved except coughing no fever chills no chest pain tested negative yesterday treated with Paxlovid the itching improved with the Lamisil about the sole and a palm and persistent onychomycosis we will continue Lamisil and also complaining of pain cervical spine area with tingling numbness right upper extremity being treated with Medrol Dosepak and to be evaluated by pain management      The following portions of the patient's history were reviewed and updated as appropriate: allergies, current medications, past family history, past medical history, past social history, past surgical history and problem list.    Review of Systems   Constitutional: Negative for activity change, appetite change, chills, diaphoresis, fatigue, fever and unexpected weight change. HENT: Negative for congestion, dental problem, drooling, ear discharge, ear pain, facial swelling, hearing loss, mouth sores, nosebleeds, postnasal drip, rhinorrhea, sinus pressure, sneezing, sore throat, tinnitus, trouble swallowing and voice change.     Eyes: Negative for photophobia, pain, discharge, redness, itching and visual disturbance. Respiratory: Negative for apnea, cough, choking, chest tightness, shortness of breath, wheezing and stridor. Cardiovascular: Negative for chest pain, palpitations and leg swelling. Gastrointestinal: Negative for abdominal distention, abdominal pain, anal bleeding, blood in stool, constipation, diarrhea, nausea, rectal pain and vomiting. Endocrine: Negative for cold intolerance, heat intolerance, polydipsia, polyphagia and polyuria. Genitourinary: Negative for decreased urine volume, difficulty urinating, dysuria, enuresis, flank pain, frequency, genital sores, hematuria and urgency. Musculoskeletal: Positive for neck pain. Negative for arthralgias, back pain, gait problem, joint swelling, myalgias and neck stiffness. Skin: Negative for color change, pallor, rash and wound. Allergic/Immunologic: Negative. Negative for environmental allergies, food allergies and immunocompromised state. Neurological: Negative for dizziness, tremors, seizures, syncope, facial asymmetry, speech difficulty, weakness, light-headedness, numbness and headaches. Psychiatric/Behavioral: Negative for agitation, behavioral problems, confusion, decreased concentration, dysphoric mood, hallucinations, self-injury, sleep disturbance and suicidal ideas. The patient is not nervous/anxious and is not hyperactive.           Historical Information   Patient Active Problem List   Diagnosis   • Acquired hallux valgus of both feet   • Onychomycosis   • Chronic pain of left knee   • Generalized pruritus   • Chronic diarrhea   • Prediabetes   • BMI 26.0-26.9,adult   • Elevated blood pressure reading   • Smoking   • Intrinsic eczema   • Osteoarthritis of both knees   • Acute tracheobronchitis   • Primary hypertension   • Mixed hyperlipidemia   • Hypothyroidism   • Osteophyte of left hand   • Cough variant asthma   • Chronic cough   • Urgency of urination   • Injury of right hand   • Benign prostatic hyperplasia with urinary frequency   • Urticaria   • Annual physical exam   • Chronic left shoulder pain   • COVID-19   • Cervical radiculopathy     Past Medical History:   Diagnosis Date   • Arthritis    • Contact lens/glasses fitting    • Disease of thyroid gland     hypo   • Dry skin     hands/feet   • GERD (gastroesophageal reflux disease)    • Vitamin D deficiency      Past Surgical History:   Procedure Laterality Date   • COLONOSCOPY N/A 2017    Procedure: COLONOSCOPY;  Surgeon: Domenic Atwood MD;  Location: [address] [place] [address] GI LAB; Service: Gastroenterology   • ESOPHAGOGASTRODUODENOSCOPY N/A 2017    Procedure: ESOPHAGOGASTRODUODENOSCOPY (EGD); Surgeon: Domenic Atwood MD;  Location: [facility] GI LAB;   Service: Gastroenterology   • SC EXC LESION TDN SHTH/JT CAPSL HAND/FNGR Left 2022    Procedure: EXCISION GANGLION CYST - left index finger osteophyte excision;  Surgeon: Hamida Morris MD;  Location: [facility] MAIN OR;  Service: Orthopedics     Social History     Substance and Sexual Activity   Alcohol Use Not Currently    Comment: socially     Social History     Substance and Sexual Activity   Drug Use No     Social History     Tobacco Use   Smoking Status Every Day   • Packs/day: 0.50   • Years: 20.00   • Total pack years: 10.00   • Types: Cigarettes   • Last attempt to quit: 10/30/2022   • Years since quittin.8   Smokeless Tobacco Never     Family History   Problem Relation Age of Onset   • Cancer Mother         stomach   • Hypertension Mother    • Liver cancer Mother    • Cancer Father         colon   • Liver cancer Father    • Hypertension Sister    • No Known Problems Brother    • No Known Problems Daughter    • Leukemia Son    • No Known Problems Brother    • No Known Problems Daughter    • No Known Problems Son      Health Maintenance Due   Topic   • Hepatitis C Screening    • HIV Screening    • Pneumococcal Vaccine: Pediatrics (0 to 5 Years) and At-Risk Patients (6 to 64 Years) (2 - PCV)   • COVID-19 Vaccine (2 - Moderna series)   • OT PLAN OF CARE    • Influenza Vaccine (1)      Meds/Allergies       Current Outpatient Medications:   •  levothyroxine (Synthroid) 75 mcg tablet, Take 1 tablet (75 mcg total) by mouth daily in the early morning, Disp: 90 tablet, Rfl: 1  •  meloxicam (MOBIC) 15 mg tablet, Take 1 tablet (15 mg total) by mouth daily, Disp: 90 tablet, Rfl: 1  •  methylPREDNISolone 4 MG tablet therapy pack, Use as directed on package, Disp: 21 each, Rfl: 0  •  montelukast (SINGULAIR) 10 mg tablet, Take 1 tablet (10 mg total) by mouth daily at bedtime, Disp: 90 tablet, Rfl: 1  •  tamsulosin (FLOMAX) 0.4 mg, Take 2 pills AT bedtime, Disp: 180 capsule, Rfl: 1  •  terbinafine (LamISIL) 250 mg tablet, Take 1 tablet (250 mg total) by mouth daily, Disp: 30 tablet, Rfl: 1  •  guaifenesin-codeine (GUAIFENESIN AC) 100-10 MG/5ML liquid, Take 10 mL by mouth 3 (three) times a day as needed for cough (Patient not taking: Reported on 8/31/2023), Disp: 180 mL, Rfl: 0  •  hydrOXYzine pamoate (VISTARIL) 25 mg capsule, TAKE 1 CAPSULE (25 MG TOTAL) BY MOUTH 2 (TWO) TIMES A DAY AS NEEDED FOR ITCHING (Patient not taking: Reported on 8/31/2023), Disp: 180 capsule, Rfl: 1      Objective:    Vitals:   /80   Pulse 76   Temp 98 °F (36.7 °C)   Resp 16   Ht 5' 5" (1.651 m)   Wt 105 kg (231 lb 9.6 oz)   SpO2 96%   BMI 38.54 kg/m²   Body mass index is 38.54 kg/m². Vitals:    08/31/23 0842   Weight: 105 kg (231 lb 9.6 oz)       Physical Exam  Vitals and nursing note reviewed. Constitutional:       General: He is not in acute distress. Appearance: He is well-developed. He is not ill-appearing, toxic-appearing or diaphoretic. HENT:      Head: Normocephalic and atraumatic. Right Ear: External ear normal.      Left Ear: External ear normal.      Nose: Nose normal.      Mouth/Throat:      Pharynx: No oropharyngeal exudate.    Eyes:      General: Lids are normal. Lids are everted, no foreign bodies appreciated. No scleral icterus. Right eye: No discharge. Left eye: No discharge. Conjunctiva/sclera: Conjunctivae normal.      Pupils: Pupils are equal, round, and reactive to light. Neck:      Thyroid: No thyromegaly. Vascular: Normal carotid pulses. No carotid bruit, hepatojugular reflux or JVD. Trachea: No tracheal tenderness or tracheal deviation. Cardiovascular:      Rate and Rhythm: Normal rate and regular rhythm. Pulses: Normal pulses. Heart sounds: Normal heart sounds. No murmur heard. No friction rub. No gallop. Pulmonary:      Effort: Pulmonary effort is normal. No respiratory distress. Breath sounds: Normal breath sounds. No stridor. No wheezing or rales. Chest:      Chest wall: No tenderness. Abdominal:      General: Bowel sounds are normal. There is no distension. Palpations: Abdomen is soft. There is no mass. Tenderness: There is no abdominal tenderness. There is no guarding or rebound. Musculoskeletal:         General: No tenderness or deformity. Normal range of motion. Cervical back: Normal range of motion and neck supple. No edema, erythema or rigidity. No spinous process tenderness or muscular tenderness. Normal range of motion. Lymphadenopathy:      Head:      Right side of head: No submental, submandibular, tonsillar, preauricular or posterior auricular adenopathy. Left side of head: No submental, submandibular, tonsillar, preauricular, posterior auricular or occipital adenopathy. Cervical: No cervical adenopathy. Right cervical: No superficial, deep or posterior cervical adenopathy. Left cervical: No superficial, deep or posterior cervical adenopathy. Upper Body:      Right upper body: No pectoral adenopathy. Left upper body: No pectoral adenopathy. Skin:     General: Skin is warm and dry. Coloration: Skin is not pale.       Findings: No erythema or rash.   Neurological:      Mental Status: He is alert and oriented to person, place, and time. Cranial Nerves: No cranial nerve deficit. Sensory: No sensory deficit. Motor: No tremor, abnormal muscle tone or seizure activity. Coordination: Coordination normal.      Gait: Gait normal.      Deep Tendon Reflexes: Reflexes are normal and symmetric. Reflexes normal.   Psychiatric:         Behavior: Behavior normal.         Thought Content: Thought content normal.         Judgment: Judgment normal.         Lab Review   No visits with results within 2 Month(s) from this visit. Latest known visit with results is:   Appointment on 02/23/2023   Component Date Value Ref Range Status   • Color, UA 02/23/2023 Yellow   Final   • Clarity, UA 02/23/2023 Clear   Final   • Specific Gravity, UA 02/23/2023 1.017  1.003 - 1.030 Final   • pH, UA 02/23/2023 6.5  4.5, 5.0, 5.5, 6.0, 6.5, 7.0, 7.5, 8.0 Final   • Leukocytes, UA 02/23/2023 Negative  Negative Final   • Nitrite, UA 02/23/2023 Negative  Negative Final   • Protein, UA 02/23/2023 Trace (A)  Negative mg/dl Final   • Glucose, UA 02/23/2023 Negative  Negative mg/dl Final   • Ketones, UA 02/23/2023 Negative  Negative mg/dl Final   • Urobilinogen, UA 02/23/2023 <2.0  <2.0 mg/dl mg/dl Final   • Bilirubin, UA 02/23/2023 Negative  Negative Final   • Occult Blood, UA 02/23/2023 Trace (A)  Negative Final   • RBC, UA 02/23/2023 None Seen  None Seen, 1-2 /hpf Final   • WBC, UA 02/23/2023 1-2  None Seen, 1-2 /hpf Final   • Epithelial Cells 02/23/2023 None Seen  None Seen, Occasional /hpf Final   • Bacteria, UA 02/23/2023 None Seen  None Seen, Occasional /hpf Final   • MUCUS THREADS 02/23/2023 Moderate (A)  None Seen Final         Patient Instructions   Acute Cough   WHAT YOU NEED TO KNOW:   An acute cough can last up to 3 weeks. Common causes of an acute cough include a cold, allergies, or a lung infection.   DISCHARGE INSTRUCTIONS:   Return to the emergency department if: You have trouble breathing or feel short of breath. You cough up blood, or you see blood in your mucus. You faint or feel weak or dizzy. You have chest pain when you cough or take a deep breath. You have new wheezing. Contact your healthcare provider if:   You have a fever. Your cough lasts longer than 4 weeks. Your symptoms do not improve with treatment. You have questions or concerns about your condition or care. Medicines:   Medicines  may be needed to stop the cough, decrease swelling in your airways, or help open your airways. Medicine may also be given to help you cough up mucus. Ask your healthcare provider what over-the-counter medicines you can take. If you have an infection caused by bacteria, you may need antibiotics. Take your medicine as directed. Contact your healthcare provider if you think your medicine is not helping or if you have side effects. Tell your provider if you are allergic to any medicine. Keep a list of the medicines, vitamins, and herbs you take. Include the amounts, and when and why you take them. Bring the list or the pill bottles to follow-up visits. Carry your medicine list with you in case of an emergency. Manage your symptoms:   Do not smoke and stay away from others who smoke. Nicotine and other chemicals in cigarettes and cigars can cause lung damage and make your cough worse. Ask your healthcare provider for information if you currently smoke and need help to quit. E-cigarettes or smokeless tobacco still contain nicotine. Talk to your healthcare provider before you use these products. Drink extra liquids as directed. Liquids will help thin and loosen mucus so you can cough it up. Liquids will also help prevent dehydration. Examples of good liquids to drink include water, fruit juice, and broth. Do not drink liquids that contain caffeine. Caffeine can increase your risk for dehydration.  Ask your healthcare provider how much liquid to drink each day. Rest as directed. Do not do activities that make your cough worse, such as exercise. Use a humidifier or vaporizer. Use a cool mist humidifier or a vaporizer to increase air moisture in your home. This may make it easier for you to breathe and help decrease your cough. Eat 2 to 5 mL of honey 2 times each day. Honey can help thin mucus and decrease your cough. Use cough drops or lozenges. These can help decrease throat irritation and your cough. Follow up with your healthcare provider as directed:  Write down your questions so you remember to ask them during your visits. © Copyright [employer] 2022 Information is for End User's use only and may not be sold, redistributed or otherwise used for commercial purposes. The above information is an  only. It is not intended as medical advice for individual conditions or treatments. Talk to your doctor, nurse or pharmacist before following any medical regimen to see if it is safe and effective for you. Clua Atkins MD        "This note has been constructed using a voice recognition system. Therefore there may be syntax, spelling, and/or grammatical errors.  Please call if you have any questions. "

## 2023-08-31 NOTE — ASSESSMENT & PLAN NOTE
Continue singular no wheezing persistent coughing persist will start on Azmacort patient reluctant to use Azmacort

## 2023-08-31 NOTE — ASSESSMENT & PLAN NOTE
Pain in cervical spine radiating right upper extremity with tingling numbness to be seen by pain management as requested by the patient continue meloxicam once a day after finishing Medrol Dosepak awaiting to be seen by pain management as patient requesting

## 2023-08-31 NOTE — ASSESSMENT & PLAN NOTE
Patient's blood pressure seems to be controlled continue low-salt diet and frequent blood pressure monitoring

## 2023-08-31 NOTE — PATIENT INSTRUCTIONS
Acute Cough   WHAT YOU NEED TO KNOW:   An acute cough can last up to 3 weeks. Common causes of an acute cough include a cold, allergies, or a lung infection. DISCHARGE INSTRUCTIONS:   Return to the emergency department if:   You have trouble breathing or feel short of breath. You cough up blood, or you see blood in your mucus. You faint or feel weak or dizzy. You have chest pain when you cough or take a deep breath. You have new wheezing. Contact your healthcare provider if:   You have a fever. Your cough lasts longer than 4 weeks. Your symptoms do not improve with treatment. You have questions or concerns about your condition or care. Medicines:   Medicines  may be needed to stop the cough, decrease swelling in your airways, or help open your airways. Medicine may also be given to help you cough up mucus. Ask your healthcare provider what over-the-counter medicines you can take. If you have an infection caused by bacteria, you may need antibiotics. Take your medicine as directed. Contact your healthcare provider if you think your medicine is not helping or if you have side effects. Tell your provider if you are allergic to any medicine. Keep a list of the medicines, vitamins, and herbs you take. Include the amounts, and when and why you take them. Bring the list or the pill bottles to follow-up visits. Carry your medicine list with you in case of an emergency. Manage your symptoms:   Do not smoke and stay away from others who smoke. Nicotine and other chemicals in cigarettes and cigars can cause lung damage and make your cough worse. Ask your healthcare provider for information if you currently smoke and need help to quit. E-cigarettes or smokeless tobacco still contain nicotine. Talk to your healthcare provider before you use these products. Drink extra liquids as directed. Liquids will help thin and loosen mucus so you can cough it up.  Liquids will also help prevent dehydration. Examples of good liquids to drink include water, fruit juice, and broth. Do not drink liquids that contain caffeine. Caffeine can increase your risk for dehydration. Ask your healthcare provider how much liquid to drink each day. Rest as directed. Do not do activities that make your cough worse, such as exercise. Use a humidifier or vaporizer. Use a cool mist humidifier or a vaporizer to increase air moisture in your home. This may make it easier for you to breathe and help decrease your cough. Eat 2 to 5 mL of honey 2 times each day. Honey can help thin mucus and decrease your cough. Use cough drops or lozenges. These can help decrease throat irritation and your cough. Follow up with your healthcare provider as directed:  Write down your questions so you remember to ask them during your visits. © Copyright [facility] 2022 Information is for End User's use only and may not be sold, redistributed or otherwise used for commercial purposes. The above information is an  only. It is not intended as medical advice for individual conditions or treatments. Talk to your doctor, nurse or pharmacist before following any medical regimen to see if it is safe and effective for you.

## 2023-08-31 NOTE — ASSESSMENT & PLAN NOTE
Treated with Paxlovid persistent coughing tested negative yesterday no difficulty breathing no fever chills chest pain persistent coughing slowly improving will give course of Medrol Dosepak if persistent coughing we will do chest CT of the chest was advised

## 2023-09-18 ENCOUNTER — CONSULT (OUTPATIENT)
Dept: PAIN MEDICINE | Facility: CLINIC | Age: 58
End: 2023-09-18
Payer: COMMERCIAL

## 2023-09-18 ENCOUNTER — APPOINTMENT (OUTPATIENT)
Dept: RADIOLOGY | Facility: CLINIC | Age: 58
End: 2023-09-18
Payer: COMMERCIAL

## 2023-09-18 VITALS
BODY MASS INDEX: 38.61 KG/M2 | HEART RATE: 89 BPM | DIASTOLIC BLOOD PRESSURE: 88 MMHG | SYSTOLIC BLOOD PRESSURE: 160 MMHG | TEMPERATURE: 98.5 F | WEIGHT: 232 LBS

## 2023-09-18 DIAGNOSIS — M25.562 CHRONIC PAIN OF LEFT KNEE: ICD-10-CM

## 2023-09-18 DIAGNOSIS — M54.12 CERVICAL RADICULOPATHY: ICD-10-CM

## 2023-09-18 DIAGNOSIS — M54.12 CERVICAL RADICULOPATHY: Primary | ICD-10-CM

## 2023-09-18 DIAGNOSIS — I10 PRIMARY HYPERTENSION: ICD-10-CM

## 2023-09-18 DIAGNOSIS — G89.29 CHRONIC PAIN OF LEFT KNEE: ICD-10-CM

## 2023-09-18 PROCEDURE — 72050 X-RAY EXAM NECK SPINE 4/5VWS: CPT

## 2023-09-18 PROCEDURE — 99204 OFFICE O/P NEW MOD 45 MIN: CPT | Performed by: STUDENT IN AN ORGANIZED HEALTH CARE EDUCATION/TRAINING PROGRAM

## 2023-09-18 RX ORDER — METHYLPREDNISOLONE 4 MG/1
TABLET ORAL
Qty: 1 EACH | Refills: 0 | Status: SHIPPED | OUTPATIENT
Start: 2023-09-18 | End: 2023-09-28 | Stop reason: SDUPTHER

## 2023-09-18 NOTE — PROGRESS NOTES
Assessment:  1. Cervical radiculopathy    2. Primary hypertension    3. Chronic pain of left knee      Patient is a pleasant 28-year-old male presenting with 1 year of bilateral neck pain with right-sided radicular features. Over the past month the intensity pain has been moderate to severe and he rates pain 8 out of 10 on numeric rating scale. The pain is occurring constantly, 100% of the time. During the past month the symptoms have been worse in the evening and at night. He describes the pain as shooting, numbing, sharp, pins-and-needles, dull with tingles. He has had some weakness in upper extremities as well and he does not use any assistive devices. Activities that increases pain include prayer, lying down, standing, walking, exercise. He has not seen a physician for these symptoms before. He has not underwent any physical therapy for this. He does smoke about half a pack a day. In the past he tried Lidoderm patches which did not help and he is currently using Biofreeze, Tylenol, meloxicam, naproxen as needed. He has also been trialed on steroid tapers in the past.    Discussion with patient symptoms mainly right-sided neck radiating to his wrist does not pass into his fingers. Symptoms are worse when he flexes and extends his neck and when he rotates to the right. He has not started any physical therapy specifically for these complaints. He was prescribed a Medrol Dosepak but he never picked it up from the pharmacy. On exam patient with negative Spurling's and Badoky sign. No Blanchard's. Pain with rotation and extension of the cervical spine and tenderness to palpation in the cervical paraspinal muscles on the right. Given these findings we will start patient on formal physical therapy program for cervical radiculopathy. Additionally we will start a Medrol Dosepak. Patient to follow-up in 6 weeks and at that time symptoms persist we will order MRI of the cervical spine. Plan:  1. Xray cervical spine  2. PT for cervical radiculitis   3. Start medrol dose pack   4. Follow up in 6 weeks   Orders Placed This Encounter   Procedures   • X-ray cervical spine complete 4 or 5 vw     Right neck pain     Standing Status:   Future     Number of Occurrences:   1     Standing Expiration Date:   9/18/2027     Scheduling Instructions:      Bring along any outside films relating to this procedure. • Ambulatory referral to Physical Therapy     Standing Status:   Future     Standing Expiration Date:   9/18/2024     Referral Priority:   Routine     Referral Type:   Physical Therapy     Referral Reason:   Specialty Services Required     Requested Specialty:   Physical Therapy     Number of Visits Requested:   1     Expiration Date:   9/18/2024       New Medications Ordered This Visit   Medications   • methylPREDNISolone 4 MG tablet therapy pack     Sig: Use as directed on package     Dispense:  1 each     Refill:  0       My impressions and treatment recommendations were discussed in detail with the patient, who verbalized understanding and had no further questions. Complete risks and benefits including bleeding, infection, tissue reaction, nerve injury and allergic reaction were discussed. The approach was demonstrated using models and literature was provided. Verbal and written consent was obtained. Follow-up is planned in six weeks time or sooner as warranted. Discharge instructions were provided. I personally saw and examined the patient and I agree with the above discussed plan of care. History of Present Illness:    Aubrie Figueroa is a 63 y.o. male who presents to [address] [place] [address] and [facility] for initial evaluation of the above stated pain complaints. The patient has a past medical and chronic pain history as outlined in the assessment section. He was referred by Sigrid Antonio MD  [facility] [address],  [address] [place] [address] .     Patient is a pleasant 18-year-old male presenting with 1 year of bilateral neck pain with right-sided radicular features. Over the past month the intensity pain has been moderate to severe and he rates pain 8 out of 10 on numeric rating scale. The pain is occurring constantly, 100% of the time. During the past month the symptoms have been worse in the evening and at night. He describes the pain as shooting, numbing, sharp, pins-and-needles, dull with tingles. He has had some weakness in upper extremities as well and he does not use any assistive devices. Activities that increases pain include prayer, lying down, standing, walking, exercise. He has not seen a physician for these symptoms before. He has not underwent any physical therapy for this. He does smoke about half a pack a day. In the past he tried Lidoderm patches which did not help and he is currently using Biofreeze, Tylenol, meloxicam, naproxen as needed. He has also been trialed on steroid tapers in the past.    Discussion with patient symptoms mainly right-sided neck radiating to his wrist does not pass into his fingers. Symptoms are worse when he flexes and extends his neck and when he rotates to the right. He has not started any physical therapy specifically for these complaints. He was prescribed a Medrol Dosepak but he never picked it up from the pharmacy    Review of Systems:    Review of Systems   Constitutional: Negative for chills and fatigue. HENT: Negative for ear pain, mouth sores and sinus pressure. Eyes: Negative for pain, redness and visual disturbance. Respiratory: Negative for shortness of breath and wheezing. Cardiovascular: Negative for chest pain and palpitations. Gastrointestinal: Negative for abdominal pain and nausea. Endocrine: Negative for polyphagia. Musculoskeletal: Positive for neck pain and neck stiffness. Negative for arthralgias and back pain. Skin: Negative for wound. Neurological: Positive for weakness and numbness. Negative for seizures. Psychiatric/Behavioral: Positive for sleep disturbance. Negative for dysphoric mood. Past Medical History:   Diagnosis Date   • Arthritis    • Contact lens/glasses fitting    • Disease of thyroid gland     hypo   • Dry skin     hands/feet   • GERD (gastroesophageal reflux disease)    • Vitamin D deficiency        Past Surgical History:   Procedure Laterality Date   • COLONOSCOPY N/A 2017    Procedure: COLONOSCOPY;  Surgeon: Wero Marin MD;  Location: [address] [place] [address] GI LAB; Service: Gastroenterology   • ESOPHAGOGASTRODUODENOSCOPY N/A 2017    Procedure: ESOPHAGOGASTRODUODENOSCOPY (EGD); Surgeon: Wero Marin MD;  Location: [facility] GI LAB;   Service: Gastroenterology   • [place] EXC LESION TDN SHTH/JT CAPSL HAND/FNGR Left 2022    Procedure: EXCISION GANGLION CYST - left index finger osteophyte excision;  Surgeon: Mili Lin MD;  Location: [facility] MAIN OR;  Service: Orthopedics   • REPLACEMENT TOTAL KNEE Right        Family History   Problem Relation Age of Onset   • Cancer Mother         stomach   • Hypertension Mother    • Liver cancer Mother    • Cancer Father         colon   • Liver cancer Father    • Hypertension Sister    • No Known Problems Brother    • No Known Problems Daughter    • Leukemia Son    • No Known Problems Brother    • No Known Problems Daughter    • No Known Problems Son        Social History     Occupational History   • Not on file   Tobacco Use   • Smoking status: Every Day     Packs/day: 0.50     Years: 20.00     Total pack years: 10.00     Types: Cigarettes     Last attempt to quit: 10/30/2022     Years since quittin.8   • Smokeless tobacco: Never   Vaping Use   • Vaping Use: Never used   Substance and Sexual Activity   • Alcohol use: Not Currently     Comment: socially   • Drug use: No   • Sexual activity: Not on file         Current Outpatient Medications:   •  levothyroxine (Synthroid) 75 mcg tablet, Take 1 tablet (75 mcg total) by mouth daily in the early morning, Disp: 90 tablet, Rfl: 1  •  meloxicam (MOBIC) 15 mg tablet, Take 1 tablet (15 mg total) by mouth daily, Disp: 90 tablet, Rfl: 1  •  methylPREDNISolone 4 MG tablet therapy pack, Use as directed on package, Disp: 1 each, Rfl: 0  •  tamsulosin (FLOMAX) 0.4 mg, Take 2 pills AT bedtime, Disp: 180 capsule, Rfl: 1  •  guaifenesin-codeine (GUAIFENESIN AC) 100-10 MG/5ML liquid, Take 10 mL by mouth 3 (three) times a day as needed for cough (Patient not taking: Reported on 8/31/2023), Disp: 180 mL, Rfl: 0  •  hydrOXYzine pamoate (VISTARIL) 25 mg capsule, TAKE 1 CAPSULE (25 MG TOTAL) BY MOUTH 2 (TWO) TIMES A DAY AS NEEDED FOR ITCHING (Patient not taking: Reported on 8/31/2023), Disp: 180 capsule, Rfl: 1  •  montelukast (SINGULAIR) 10 mg tablet, Take 1 tablet (10 mg total) by mouth daily at bedtime (Patient not taking: Reported on 9/18/2023), Disp: 90 tablet, Rfl: 1  •  terbinafine (LamISIL) 250 mg tablet, Take 1 tablet (250 mg total) by mouth daily (Patient not taking: Reported on 9/18/2023), Disp: 30 tablet, Rfl: 1    No Known Allergies    Physical Exam:    /88   Pulse 89   Temp 98.5 °F (36.9 °C)   Wt 105 kg (232 lb)   BMI 38.61 kg/m²     Constitutional: normal, well developed, well nourished, alert, in no distress and non-toxic and no overt pain behavior. Eyes: anicteric  HEENT: grossly intact  Neck: supple, symmetric, trachea midline and no masses   Pulmonary:even and unlabored  Cardiovascular:No edema or pitting edema present  Skin:Normal without rashes or lesions and well hydrated  Psychiatric:Mood and affect appropriate  Neurologic:Cranial Nerves II-XII grossly intact  Musculoskeletal:normal  Neck Exam:    Visual: No rashes  Physical Exam: Patient is tender to palpation in area of the right trapezius and rhomboids, is tender to palpation of the on right cervical paraspinal muscles.   Range of Motion: full range with pain     Low Back Exam:   Visual Exam: No rashes  Physical Exam: Patient is non-tender to palpation in area of the bilateral lumbar paraspinal area. Tests:    Facet Loading - Patient has negative   bilateral facet loading.   Spurling's-Negative bilaterally  Badoky-Negative bilaterally  Blanchard's-Negative     NEUROLOGICAL:   CN 2-10 intact bilaterally   Sensory Exam: no sensory deficits noted  Reflex: Normal  Strength :Normal Shoulder Abduction (C5 & Axillary Nerves), Bilaterally, Graded +5/5, Normal Elbow Flexion (C5, C6, & Musculocutaneous Nerves, Bilaterally, Graded +5/5, Normal Elbow Extension (C7 & Radial Nerve), Bilaterally, Graded +5/5, Normal Wrist Extension, Bilaterally, Graded +5/5, Normal Hand  Strength, Bilaterally, Graded +5/5, Normal Finger Abduction, Bilaterally, Graded +5/5, Normal Thumb Opposition, Bilaterally, Graded +5/5, Normal Ankle Dorsiflexion, Bilaterally, Graded +5/5, Normal Dorsiflexion of Great Toe, Bilaterally, Graded +5/5      Imaging  No orders to display       Orders Placed This Encounter   Procedures   • X-ray cervical spine complete 4 or 5 vw   • Ambulatory referral to Physical Therapy

## 2023-09-21 ENCOUNTER — RA CDI HCC (OUTPATIENT)
Dept: OTHER | Facility: HOSPITAL | Age: 58
End: 2023-09-21

## 2023-09-21 NOTE — PROGRESS NOTES
[facility] [address] [facility] coding opportunities       Chart reviewed, no opportunity found: CHART REVIEWED, NO OPPORTUNITY FOUND        Patients Insurance        Commercial Insurance: [employer]

## 2023-09-28 ENCOUNTER — TELEPHONE (OUTPATIENT)
Age: 58
End: 2023-09-28

## 2023-09-28 ENCOUNTER — OFFICE VISIT (OUTPATIENT)
Dept: INTERNAL MEDICINE CLINIC | Facility: CLINIC | Age: 58
End: 2023-09-28
Payer: COMMERCIAL

## 2023-09-28 VITALS
RESPIRATION RATE: 16 BRPM | BODY MASS INDEX: 38.49 KG/M2 | SYSTOLIC BLOOD PRESSURE: 136 MMHG | DIASTOLIC BLOOD PRESSURE: 82 MMHG | WEIGHT: 231 LBS | TEMPERATURE: 98 F | OXYGEN SATURATION: 97 % | HEIGHT: 65 IN | HEART RATE: 76 BPM

## 2023-09-28 DIAGNOSIS — M54.12 CERVICAL RADICULOPATHY: ICD-10-CM

## 2023-09-28 DIAGNOSIS — E03.9 HYPOTHYROIDISM, UNSPECIFIED TYPE: ICD-10-CM

## 2023-09-28 DIAGNOSIS — U07.1 COVID: ICD-10-CM

## 2023-09-28 DIAGNOSIS — U07.1 COVID-19: ICD-10-CM

## 2023-09-28 DIAGNOSIS — R73.03 PREDIABETES: ICD-10-CM

## 2023-09-28 DIAGNOSIS — J20.9 ACUTE TRACHEOBRONCHITIS: Primary | ICD-10-CM

## 2023-09-28 LAB
SARS-COV-2 AG UPPER RESP QL IA: NEGATIVE
VALID CONTROL: NORMAL

## 2023-09-28 PROCEDURE — 87811 SARS-COV-2 COVID19 W/OPTIC: CPT | Performed by: INTERNAL MEDICINE

## 2023-09-28 PROCEDURE — 99213 OFFICE O/P EST LOW 20 MIN: CPT | Performed by: INTERNAL MEDICINE

## 2023-09-28 RX ORDER — AZITHROMYCIN 250 MG/1
TABLET, FILM COATED ORAL
Qty: 6 TABLET | Refills: 0 | Status: SHIPPED | OUTPATIENT
Start: 2023-09-28 | End: 2023-10-03

## 2023-09-28 RX ORDER — GUAIFENESIN AND CODEINE PHOSPHATE 100; 10 MG/5ML; MG/5ML
10 SOLUTION ORAL 3 TIMES DAILY PRN
Qty: 180 ML | Refills: 0 | Status: SHIPPED | OUTPATIENT
Start: 2023-09-28

## 2023-09-28 RX ORDER — METHYLPREDNISOLONE 4 MG/1
TABLET ORAL
Qty: 1 EACH | Refills: 0 | Status: SHIPPED | OUTPATIENT
Start: 2023-09-28

## 2023-09-28 NOTE — ASSESSMENT & PLAN NOTE
Terms of the chronic cough congestion scanty expectoration yellowish nasal congestion earache.   Generalized aches pains and headache tested negative at 19

## 2023-09-28 NOTE — ASSESSMENT & PLAN NOTE
Last A1c almost about a year ago was 5.6 advised diabetic diet    We will repeat A1c with next blood work

## 2023-09-28 NOTE — PROGRESS NOTES
Dr. Dozier Snowball Office Visit Note  23     Aubrie Pleasure 63 y.o. male MRN: [id number]  : 1965    Assessment:     1. Acute tracheobronchitis  Assessment & Plan:  Complaint coughing yellowish sputum for last 3 days nasal congestion earache headache tested negative for COVID denies any high fever chest pain difficulty breathing treated with Zithromax Medrol Dosepak and cough medicine with codeine symptomatic treatment instructed if overall condition gets worse should go to the emergency room    Orders:  -     azithromycin (Zithromax) 250 mg tablet; Take 2 tablets (500 mg total) by mouth daily for 1 day, THEN 1 tablet (250 mg total) daily for 4 days.  -     guaifenesin-codeine (GUAIFENESIN AC) 100-10 MG/5ML liquid; Take 10 mL by mouth 3 (three) times a day as needed for cough    2. COVID  -     POCT Rapid Covid Ag    3. Cervical radiculopathy  Assessment & Plan:  Pain in cervical spine radiating right upper extremity with tingling numbness to be seen by pain management as requested by the patient continue meloxicam once a day after finishing Medrol Dosepak awaiting to be seen by pain management as patient requesting  Is able much improved although awaiting to be seen by pain management    Orders:  -     methylPREDNISolone 4 MG tablet therapy pack; Use as directed on package    4. COVID-19  Assessment & Plan:  Terms of the chronic cough congestion scanty expectoration yellowish nasal congestion earache. Generalized aches pains and headache tested negative at 19    Orders:  -     guaifenesin-codeine (GUAIFENESIN AC) 100-10 MG/5ML liquid; Take 10 mL by mouth 3 (three) times a day as needed for cough    5. Hypothyroidism, unspecified type  Assessment & Plan:  Continue current dose of Synthroid 75 mcg symptoms controlled      6. Prediabetes  Assessment & Plan:  Last A1c almost about a year ago was 5.6 advised diabetic diet    We will repeat A1c with next blood work            Discussion Summary and Plan:   Today's care plan and medications were reviewed with patient in detail and all their questions answered to their satisfaction. Chief Complaint   Patient presents with   • Follow-up     Extreme coughing, congested, sinus pressure for 3 days. Subjective:  Patient came in follow-up chronic medical condition listed under visit diagnosis and mainly coughing with yellowish sputum nasal congestion headache sore throat aches pains low-grade fever denies any chest pain difficulty breathing was tested negative for COVID in the office      The following portions of the patient's history were reviewed and updated as appropriate: allergies, current medications, past family history, past medical history, past social history, past surgical history and problem list.    Review of Systems   Constitutional: Negative for activity change, appetite change, chills, diaphoresis, fatigue, fever and unexpected weight change. HENT: Positive for congestion and rhinorrhea. Negative for dental problem, drooling, ear discharge, ear pain, facial swelling, hearing loss, mouth sores, nosebleeds, postnasal drip, sinus pressure, sneezing, sore throat, tinnitus, trouble swallowing and voice change. Eyes: Negative for photophobia, pain, discharge, redness, itching and visual disturbance. Respiratory: Positive for cough. Negative for apnea, choking, chest tightness, shortness of breath, wheezing and stridor. Cardiovascular: Negative for chest pain, palpitations and leg swelling. Gastrointestinal: Negative for abdominal distention, abdominal pain, anal bleeding, blood in stool, constipation, diarrhea, nausea, rectal pain and vomiting. Endocrine: Negative for cold intolerance, heat intolerance, polydipsia, polyphagia and polyuria. Genitourinary: Negative for decreased urine volume, difficulty urinating, dysuria, enuresis, flank pain, frequency, genital sores, hematuria and urgency. Musculoskeletal: Positive for neck pain.  Negative for arthralgias, back pain, gait problem, joint swelling, myalgias and neck stiffness. Skin: Negative for color change, pallor, rash and wound. Allergic/Immunologic: Negative. Negative for environmental allergies, food allergies and immunocompromised state. Neurological: Positive for headaches. Negative for dizziness, tremors, seizures, syncope, facial asymmetry, speech difficulty, weakness, light-headedness and numbness. Psychiatric/Behavioral: Negative for agitation, behavioral problems, confusion, decreased concentration, dysphoric mood, hallucinations, self-injury, sleep disturbance and suicidal ideas. The patient is not nervous/anxious and is not hyperactive. Historical Information   Patient Active Problem List   Diagnosis   • Acquired hallux valgus of both feet   • Onychomycosis   • Chronic pain of left knee   • Generalized pruritus   • Chronic diarrhea   • Prediabetes   • BMI 26.0-26.9,adult   • Elevated blood pressure reading   • Smoking   • Intrinsic eczema   • Osteoarthritis of both knees   • Acute tracheobronchitis   • Primary hypertension   • Mixed hyperlipidemia   • Hypothyroidism   • Osteophyte of left hand   • Cough variant asthma   • Chronic cough   • Urgency of urination   • Injury of right hand   • Benign prostatic hyperplasia with urinary frequency   • Urticaria   • Annual physical exam   • Chronic left shoulder pain   • COVID-19   • Cervical radiculopathy     Past Medical History:   Diagnosis Date   • Arthritis    • Contact lens/glasses fitting    • Disease of thyroid gland     hypo   • Dry skin     hands/feet   • GERD (gastroesophageal reflux disease)    • Vitamin D deficiency      Past Surgical History:   Procedure Laterality Date   • COLONOSCOPY N/A 12/22/2017    Procedure: COLONOSCOPY;  Surgeon: Joseph Ramirez MD;  Location: [address] [place] [address] GI LAB; Service: Gastroenterology   • ESOPHAGOGASTRODUODENOSCOPY N/A 12/22/2017    Procedure: ESOPHAGOGASTRODUODENOSCOPY (EGD);   Surgeon: Anthony Langley MD;  Location: [address] [place] [address] [address] GI LAB; Service: Gastroenterology   • OH EXC LESION TDN SHTH/JT CAPSL HAND/FNGR Left 2022    Procedure: EXCISION GANGLION CYST - left index finger osteophyte excision;  Surgeon: Asif Greene MD;  Location: [facility] MAIN [facility];  Service: Orthopedics   • REPLACEMENT TOTAL KNEE Right      Social History     Substance and Sexual Activity   Alcohol Use Not Currently    Comment: socially     Social History     Substance and Sexual Activity   Drug Use No     Social History     Tobacco Use   Smoking Status Every Day   • Packs/day: 0.50   • Years: 20.00   • Total pack years: 10.00   • Types: Cigarettes   • Last attempt to quit: 10/30/2022   • Years since quittin.9   Smokeless Tobacco Never     Family History   Problem Relation Age of Onset   • Cancer Mother         stomach   • Hypertension Mother    • Liver cancer Mother    • Cancer Father         colon   • Liver cancer Father    • Hypertension Sister    • No Known Problems Brother    • No Known Problems Daughter    • Leukemia Son    • No Known Problems Brother    • No Known Problems Daughter    • No Known Problems Son      Health Maintenance Due   Topic   • Hepatitis C Screening    • HIV Screening    • Pneumococcal Vaccine: Pediatrics (0 to 5 Years) and At-Risk Patients (6 to 59 Years) (2 - PCV)   • COVID-19 Vaccine (2 - Moderna series)   • OT PLAN OF CARE    • Influenza Vaccine (1)      Meds/Allergies       Current Outpatient Medications:   •  azithromycin (Zithromax) 250 mg tablet, Take 2 tablets (500 mg total) by mouth daily for 1 day, THEN 1 tablet (250 mg total) daily for 4 days. , Disp: 6 tablet, Rfl: 0  •  guaifenesin-codeine (GUAIFENESIN AC) 100-10 MG/5ML liquid, Take 10 mL by mouth 3 (three) times a day as needed for cough, Disp: 180 mL, Rfl: 0  •  levothyroxine (Synthroid) 75 mcg tablet, Take 1 tablet (75 mcg total) by mouth daily in the early morning, Disp: 90 tablet, Rfl: 1  •  meloxicam (MOBIC) 15 mg tablet, Take 1 tablet (15 mg total) by mouth daily, Disp: 90 tablet, Rfl: 1  •  methylPREDNISolone 4 MG tablet therapy pack, Use as directed on package, Disp: 1 each, Rfl: 0  •  montelukast (SINGULAIR) 10 mg tablet, Take 1 tablet (10 mg total) by mouth daily at bedtime, Disp: 90 tablet, Rfl: 1  •  tamsulosin (FLOMAX) 0.4 mg, Take 2 pills AT bedtime, Disp: 180 capsule, Rfl: 1  •  terbinafine (LamISIL) 250 mg tablet, Take 1 tablet (250 mg total) by mouth daily, Disp: 30 tablet, Rfl: 1      Objective:    Vitals:   /82   Pulse 76   Temp 98 °F (36.7 °C)   Resp 16   Ht 5' 5" (1.651 m)   Wt 105 kg (231 lb)   SpO2 97%   BMI 38.44 kg/m²   Body mass index is 38.44 kg/m². Vitals:    09/28/23 0904   Weight: 105 kg (231 lb)       Physical Exam  Vitals and nursing note reviewed. Constitutional:       General: He is not in acute distress. Appearance: He is well-developed. He is not ill-appearing, toxic-appearing or diaphoretic. HENT:      Head: Normocephalic and atraumatic. Right Ear: External ear normal.      Left Ear: External ear normal.      Nose: Congestion present. Eyes:      General: Lids are normal. Lids are everted, no foreign bodies appreciated. No scleral icterus. Right eye: No discharge. Left eye: No discharge. Conjunctiva/sclera: Conjunctivae normal.      Pupils: Pupils are equal, round, and reactive to light. Neck:      Thyroid: No thyromegaly. Vascular: Normal carotid pulses. No carotid bruit, hepatojugular reflux or JVD. Trachea: No tracheal tenderness or tracheal deviation. Cardiovascular:      Rate and Rhythm: Normal rate and regular rhythm. Pulses: Normal pulses. Heart sounds: Normal heart sounds. No murmur heard. No friction rub. No gallop. Pulmonary:      Effort: Pulmonary effort is normal. No respiratory distress. Breath sounds: No stridor. Rhonchi present. No wheezing or rales. Chest:      Chest wall: No tenderness.    Abdominal: General: Bowel sounds are normal. There is no distension. Palpations: Abdomen is soft. There is no mass. Tenderness: There is no abdominal tenderness. There is no guarding or rebound. Musculoskeletal:         General: No tenderness or deformity. Normal range of motion. Cervical back: Normal range of motion and neck supple. No edema, erythema or rigidity. No spinous process tenderness or muscular tenderness. Normal range of motion. Lymphadenopathy:      Head:      Right side of head: No submental, submandibular, tonsillar, preauricular or posterior auricular adenopathy. Left side of head: No submental, submandibular, tonsillar, preauricular, posterior auricular or occipital adenopathy. Cervical: No cervical adenopathy. Right cervical: No superficial, deep or posterior cervical adenopathy. Left cervical: No superficial, deep or posterior cervical adenopathy. Upper Body:      Right upper body: No pectoral adenopathy. Left upper body: No pectoral adenopathy. Skin:     General: Skin is warm and dry. Coloration: Skin is not pale. Findings: No erythema or rash. Neurological:      Mental Status: He is alert and oriented to person, place, and time. Cranial Nerves: No cranial nerve deficit. Sensory: No sensory deficit. Motor: No tremor, abnormal muscle tone or seizure activity. Coordination: Coordination normal.      Gait: Gait normal.      Deep Tendon Reflexes: Reflexes are normal and symmetric. Reflexes normal.   Psychiatric:         Behavior: Behavior normal.         Thought Content:  Thought content normal.         Judgment: Judgment normal.         Lab Review   Office Visit on 09/28/2023   Component Date Value Ref Range Status   • POCT SARS-CoV-2 Ag 09/28/2023 Negative  Negative Final   • VALID CONTROL 09/28/2023 Valid   Final         Patient Instructions   Acute Bronchitis   WHAT YOU NEED TO KNOW:   Acute bronchitis is swelling and irritation in your lungs. It is usually caused by a virus and most often happens in the winter. Bronchitis may also be caused by bacteria or by a chemical irritant, such as smoke. DISCHARGE INSTRUCTIONS:   Return to the emergency department if:   You cough up blood. Your lips or fingernails turn blue. You feel like you are not getting enough air when you breathe. Call your doctor if:   Your symptoms do not go away or get worse, even after treatment. Your cough does not get better within 4 weeks. You have questions or concerns about your condition or care. Medicines: You may need any of the following:  Cough suppressants  decrease your urge to cough. Decongestants  help loosen mucus in your lungs and make it easier to cough up. This can help you breathe easier. Inhalers  may be given. Your healthcare provider may give you one or more inhalers to help you breathe easier and cough less. An inhaler gives you medicine to open your airways. Ask your healthcare provider to show you how to use your inhaler correctly. Antiviral medicine  treats infections caused by a virus. Antibiotics  may be given if your bronchitis is caused by bacteria or if you have lung condition. Acetaminophen  decreases pain and fever. It is available without a doctor's order. Ask how much to take and how often to take it. Follow directions. Read the labels of all other medicines you are using to see if they also contain acetaminophen, or ask your doctor or pharmacist. Acetaminophen can cause liver damage if not taken correctly. NSAIDs  help decrease swelling and pain or fever. This medicine is available with or without a doctor's order. NSAIDs can cause stomach bleeding or kidney problems in certain people. If you take blood thinner medicine, always ask your healthcare provider if NSAIDs are safe for you. Always read the medicine label and follow directions. Self-care:   Drink liquids as directed.   You may need to drink more liquids than usual to stay hydrated. Ask how much liquid to drink each day and which liquids are best for you. Use a cool mist humidifier. This increases air moisture in your home. This may make it easier for you to breathe and help decrease your cough. Get more rest.  Rest helps your body to heal. Slowly start to do more each day. Rest when you feel it is needed. Prevent acute bronchitis:       Ask about vaccines you may need. Get a flu vaccine each year as soon as recommended, usually in September or October. Ask your healthcare provider if you should also get a pneumonia or COVID-19 vaccine. Your healthcare provider can tell you if you should also get other vaccines, and when to get them. Prevent the spread of germs. You can decrease your risk for acute bronchitis and other illnesses by doing the following:     Wash your hands often with soap and water. Carry germ-killing hand lotion or gel with you. You can use the lotion or gel to clean your hands when soap and water are not available. Do not touch your eyes, nose, or mouth unless you have washed your hands first.    Always cover your mouth when you cough to prevent the spread of germs. It is best to cough into a tissue or your shirt sleeve instead of into your hand. Ask those around you to cover their mouths when they cough. Try to avoid people who have a cold or the flu. If you are sick, stay away from others as much as possible. Avoid irritants in the air. Avoid chemicals, fumes, and dust. Wear a face mask if you must work around dust or fumes. Stay inside on days when air pollution levels are high. If you have allergies, stay inside when pollen counts are high. Do not use aerosol products, such as spray-on deodorant, bug spray, and hair spray. Do not smoke or be around others who are smoking. Nicotine and other chemicals in cigarettes and cigars can cause lung damage.  Ask your healthcare provider for information if you currently smoke and need help to quit. E-cigarettes or smokeless tobacco still contain nicotine. Talk to your healthcare provider before you use these products. Follow up with your doctor as directed:  Write down questions you have so you will remember to ask them during your follow-up visits. © Copyright Isiah Zuñiga 2023 Information is for End User's use only and may not be sold, redistributed or otherwise used for commercial purposes. The above information is an  only. It is not intended as medical advice for individual conditions or treatments. Talk to your doctor, nurse or pharmacist before following any medical regimen to see if it is safe and effective for you. Benjamín Hill MD        "This note has been constructed using a voice recognition system. Therefore there may be syntax, spelling, and/or grammatical errors.  Please call if you have any questions. "

## 2023-09-28 NOTE — ASSESSMENT & PLAN NOTE
Complaint coughing yellowish sputum for last 3 days nasal congestion earache headache tested negative for COVID denies any high fever chest pain difficulty breathing treated with Zithromax Medrol Dosepak and cough medicine with codeine symptomatic treatment instructed if overall condition gets worse should go to the emergency room

## 2023-09-28 NOTE — ASSESSMENT & PLAN NOTE
Pain in cervical spine radiating right upper extremity with tingling numbness to be seen by pain management as requested by the patient continue meloxicam once a day after finishing Medrol Dosepak awaiting to be seen by pain management as patient requesting  Is able much improved although awaiting to be seen by pain management

## 2023-09-28 NOTE — PATIENT INSTRUCTIONS
Acute Bronchitis   WHAT YOU NEED TO KNOW:   Acute bronchitis is swelling and irritation in your lungs. It is usually caused by a virus and most often happens in the winter. Bronchitis may also be caused by bacteria or by a chemical irritant, such as smoke. DISCHARGE INSTRUCTIONS:   Return to the emergency department if:   You cough up blood. Your lips or fingernails turn blue. You feel like you are not getting enough air when you breathe. Call your doctor if:   Your symptoms do not go away or get worse, even after treatment. Your cough does not get better within 4 weeks. You have questions or concerns about your condition or care. Medicines: You may need any of the following:  Cough suppressants  decrease your urge to cough. Decongestants  help loosen mucus in your lungs and make it easier to cough up. This can help you breathe easier. Inhalers  may be given. Your healthcare provider may give you one or more inhalers to help you breathe easier and cough less. An inhaler gives you medicine to open your airways. Ask your healthcare provider to show you how to use your inhaler correctly. Antiviral medicine  treats infections caused by a virus. Antibiotics  may be given if your bronchitis is caused by bacteria or if you have lung condition. Acetaminophen  decreases pain and fever. It is available without a doctor's order. Ask how much to take and how often to take it. Follow directions. Read the labels of all other medicines you are using to see if they also contain acetaminophen, or ask your doctor or pharmacist. Acetaminophen can cause liver damage if not taken correctly. NSAIDs  help decrease swelling and pain or fever. This medicine is available with or without a doctor's order. NSAIDs can cause stomach bleeding or kidney problems in certain people. If you take blood thinner medicine, always ask your healthcare provider if NSAIDs are safe for you.  Always read the medicine label and follow directions. Self-care:   Drink liquids as directed. You may need to drink more liquids than usual to stay hydrated. Ask how much liquid to drink each day and which liquids are best for you. Use a cool mist humidifier. This increases air moisture in your home. This may make it easier for you to breathe and help decrease your cough. Get more rest.  Rest helps your body to heal. Slowly start to do more each day. Rest when you feel it is needed. Prevent acute bronchitis:       Ask about vaccines you may need. Get a flu vaccine each year as soon as recommended, usually in September or October. Ask your healthcare provider if you should also get a pneumonia or COVID-19 vaccine. Your healthcare provider can tell you if you should also get other vaccines, and when to get them. Prevent the spread of germs. You can decrease your risk for acute bronchitis and other illnesses by doing the following:     Wash your hands often with soap and water. Carry germ-killing hand lotion or gel with you. You can use the lotion or gel to clean your hands when soap and water are not available. Do not touch your eyes, nose, or mouth unless you have washed your hands first.    Always cover your mouth when you cough to prevent the spread of germs. It is best to cough into a tissue or your shirt sleeve instead of into your hand. Ask those around you to cover their mouths when they cough. Try to avoid people who have a cold or the flu. If you are sick, stay away from others as much as possible. Avoid irritants in the air. Avoid chemicals, fumes, and dust. Wear a face mask if you must work around dust or fumes. Stay inside on days when air pollution levels are high. If you have allergies, stay inside when pollen counts are high. Do not use aerosol products, such as spray-on deodorant, bug spray, and hair spray. Do not smoke or be around others who are smoking.   Nicotine and other chemicals in cigarettes and cigars can cause lung damage. Ask your healthcare provider for information if you currently smoke and need help to quit. E-cigarettes or smokeless tobacco still contain nicotine. Talk to your healthcare provider before you use these products. Follow up with your doctor as directed:  Write down questions you have so you will remember to ask them during your follow-up visits. © Copyright Kiran Ports 2023 Information is for End User's use only and may not be sold, redistributed or otherwise used for commercial purposes. The above information is an  only. It is not intended as medical advice for individual conditions or treatments. Talk to your doctor, nurse or pharmacist before following any medical regimen to see if it is safe and effective for you.

## 2023-10-02 ENCOUNTER — HOSPITAL ENCOUNTER (OUTPATIENT)
Dept: RADIOLOGY | Facility: HOSPITAL | Age: 58
Discharge: HOME | End: 2023-10-02
Payer: COMMERCIAL

## 2023-10-02 DIAGNOSIS — R39.15 URGENCY OF URINATION: ICD-10-CM

## 2023-10-02 PROCEDURE — 76775 US EXAM ABDO BACK WALL LIM: CPT

## 2024-01-17 DIAGNOSIS — E03.9 HYPOTHYROIDISM, UNSPECIFIED TYPE: ICD-10-CM

## 2024-01-17 DIAGNOSIS — R39.15 URGENCY OF URINATION: ICD-10-CM

## 2024-01-17 DIAGNOSIS — M54.12 CERVICAL RADICULOPATHY: ICD-10-CM

## 2024-01-17 DIAGNOSIS — U07.1 COVID-19: ICD-10-CM

## 2024-01-17 DIAGNOSIS — M17.0 PRIMARY OSTEOARTHRITIS OF BOTH KNEES: ICD-10-CM

## 2024-01-17 DIAGNOSIS — J30.1 ALLERGIC RHINITIS DUE TO POLLEN, UNSPECIFIED SEASONALITY: ICD-10-CM

## 2024-01-17 DIAGNOSIS — J20.9 ACUTE TRACHEOBRONCHITIS: ICD-10-CM

## 2024-01-17 RX ORDER — MONTELUKAST SODIUM 10 MG/1
10 TABLET ORAL
Qty: 90 TABLET | Refills: 1 | Status: SHIPPED | OUTPATIENT
Start: 2024-01-17

## 2024-01-17 RX ORDER — METHYLPREDNISOLONE 4 MG/1
TABLET ORAL
Qty: 1 EACH | Refills: 0 | Status: SHIPPED | OUTPATIENT
Start: 2024-01-17

## 2024-01-17 RX ORDER — TAMSULOSIN HYDROCHLORIDE 0.4 MG/1
CAPSULE ORAL
Qty: 180 CAPSULE | Refills: 1 | Status: SHIPPED | OUTPATIENT
Start: 2024-01-17

## 2024-01-17 RX ORDER — MELOXICAM 15 MG/1
15 TABLET ORAL DAILY
Qty: 90 TABLET | Refills: 1 | Status: SHIPPED | OUTPATIENT
Start: 2024-01-17

## 2024-01-17 RX ORDER — CODEINE PHOSPHATE/GUAIFENESIN 10-100MG/5
10 LIQUID (ML) ORAL 3 TIMES DAILY PRN
Qty: 180 ML | Refills: 0 | OUTPATIENT
Start: 2024-01-17

## 2024-01-17 RX ORDER — LEVOTHYROXINE SODIUM 0.07 MG/1
75 TABLET ORAL
Qty: 90 TABLET | Refills: 1 | Status: SHIPPED | OUTPATIENT
Start: 2024-01-17

## 2024-02-29 ENCOUNTER — SEXUAL HEALTH (OUTPATIENT)
Dept: SURGERY | Facility: CLINIC | Age: 59
End: 2024-02-29

## 2024-02-29 DIAGNOSIS — Z11.3 SCREENING FOR STD (SEXUALLY TRANSMITTED DISEASE): Primary | ICD-10-CM

## 2024-02-29 NOTE — PROGRESS NOTES
Assessment/Plan:    Urine collected for GC/CT testing.  Blood collected for HIV/syphilis testing.  Recommend safer sex practices including 100% condom use.  Recommend regular STD testing.  Follow up 1 week for results.         Problem List Items Addressed This Visit    None  Visit Diagnoses       Screening for STD (sexually transmitted disease)    -  Primary            Practicing safe sex using a condom for each sexually encounter.  Condoms offer the best protection against STD's by acting as a physical barrier to prevent the exchange of semen, vaginal fluids, and blood between partners.  Condoms are not a 100% effective in protection.    Reducing the number of sexual partners can also help to reduce the risk of the transmission of STD's along with regular STD screening.  Subjective:      Patient ID: Shanthi Mackay is a 58 y.o. male.    Patient seen in STD clinic for STD screening.  Patient was in a rush to leave. History was limited as time with patient was short as he had to leave. Denies burning or pain with urination, urethral discharge, fever, chills, rashes, sores.    The following portions of the patient's history were reviewed and updated as appropriate: allergies, current medications, past family history, past medical history, past social history, past surgical history, and problem list.    Review of Systems   Constitutional:  Negative for chills, diaphoresis, fatigue and fever.   Gastrointestinal:  Negative for abdominal pain.   Genitourinary:  Negative for decreased urine volume, difficulty urinating, dysuria, frequency, genital sores, hematuria, penile discharge, penile pain, penile swelling, scrotal swelling, testicular pain and urgency.   Skin:  Negative for rash and wound.   Hematological:  Negative for adenopathy.       Objective:      There were no vitals taken for this visit.         Physical Exam  Nursing note reviewed.   Constitutional:       Appearance: Normal appearance.   HENT:      Head:  Normocephalic and atraumatic.   Neurological:      Mental Status: He is alert and oriented to person, place, and time.   Psychiatric:         Behavior: Behavior normal.         Thought Content: Thought content normal.         Judgment: Judgment normal.        exam deferred.        CHIEF CONCERN(S)    Patient here routine testing. Patient c/o    Condom Used: No     Sexual Preference :  Female    Date of Last Sexual Exposure: 2/1/24  //  # of Partners: Last 30 days 1, Last 90 days 1, and Last Year 2    Sexual Practices: Oral and Vaginal      Previously Sexually Transmitted Diseases  Type Date Source of Care Treatment Comment   N/a                         Test Date Results   RPR     HIV     GC     CT           1. CURRENT RISK BEHAVIOR(S)    Unprotected sex with multiple/anonymous partners and sex under the influence of drugs or alcohol     PREVIOUS SUCCESSES    Maintained a monogamous relationship with only one partner        3. SAFER GOAL BEHAVIOR(S)    Other          4. PERSON ACTION PLAN:    > BARRIERS:    No condom use or discussions    > BENEFITS:    Obtain free condoms from [facility] to decrease STD exposure, Provides a healthier sexual relationship and can reduce STD's, and Seek rehab services to address underlying causes for addictions        > ACTION STEPS:      Use condoms at least 50% of the time and steadily increase over time until condom use is 100% of the time, Discuss condom use prior to having sex with partner(s), and Get tested is an exposure occurred such as a condom breaks/ leaked          4. REFERRALS:          HIV consent given

## 2024-03-14 ENCOUNTER — SEXUAL HEALTH (OUTPATIENT)
Dept: SURGERY | Facility: CLINIC | Age: 59
End: 2024-03-14

## 2024-03-14 DIAGNOSIS — Z71.2 ENCOUNTER TO DISCUSS TEST RESULTS: Primary | ICD-10-CM

## 2024-03-14 NOTE — PROGRESS NOTES
Patient given results for HIV/STI testing.    Syphilis, and HIV tests were negative. Urine collected today for GC/CT testing.  Copies of test results reviewed and given to the patient.   Patient acknowledged understanding of such.   Patient encouraged to return with any new symptoms, new sexual partners and at least once yearly.

## 2025-02-07 ENCOUNTER — OFFICE VISIT (OUTPATIENT)
Dept: INTERNAL MEDICINE CLINIC | Facility: CLINIC | Age: 60
End: 2025-02-07
Payer: COMMERCIAL

## 2025-02-07 VITALS
TEMPERATURE: 97.9 F | BODY MASS INDEX: 37.65 KG/M2 | OXYGEN SATURATION: 99 % | WEIGHT: 226 LBS | HEART RATE: 93 BPM | DIASTOLIC BLOOD PRESSURE: 86 MMHG | SYSTOLIC BLOOD PRESSURE: 120 MMHG | HEIGHT: 65 IN

## 2025-02-07 DIAGNOSIS — R05.1 ACUTE COUGH: ICD-10-CM

## 2025-02-07 DIAGNOSIS — M54.50 ACUTE LOW BACK PAIN, UNSPECIFIED BACK PAIN LATERALITY, UNSPECIFIED WHETHER SCIATICA PRESENT: ICD-10-CM

## 2025-02-07 DIAGNOSIS — J06.9 URI WITH COUGH AND CONGESTION: Primary | ICD-10-CM

## 2025-02-07 LAB
SARS-COV-2 AG UPPER RESP QL IA: NEGATIVE
SL AMB POCT RAPID FLU A: NORMAL
SL AMB POCT RAPID FLU B: NORMAL
VALID CONTROL: NORMAL

## 2025-02-07 PROCEDURE — 99214 OFFICE O/P EST MOD 30 MIN: CPT

## 2025-02-07 PROCEDURE — 87811 SARS-COV-2 COVID19 W/OPTIC: CPT

## 2025-02-07 PROCEDURE — 87804 INFLUENZA ASSAY W/OPTIC: CPT

## 2025-02-07 RX ORDER — CODEINE PHOSPHATE AND GUAIFENESIN 10; 100 MG/5ML; MG/5ML
5 SOLUTION ORAL 3 TIMES DAILY PRN
Qty: 118 ML | Refills: 0 | Status: SHIPPED | OUTPATIENT
Start: 2025-02-07 | End: 2025-02-10

## 2025-02-07 NOTE — PROGRESS NOTES
Name: Shanthi Mackay      : 1965      MRN: [id number]  Encounter Provider: Fouzia Brenner MD  Encounter Date: 2025   Encounter department: [facility] INTERNAL MEDICINE  :  Assessment & Plan  URI with cough and congestion  Rapid test for COVID/flu negative  Prescription for cough medication sent to pharmacy  Continue with supportive measures at home for symptom management  Patient advised to recheck with at home viral tests  Return if symptoms persist or worsen  Orders:    guaiFENesin-codeine (Guaiatussin AC) 100-10 mg/5 mL oral solution; Take 5 mL by mouth 3 (three) times a day as needed for cough    Acute cough  Plan noted above  Orders:    POCT Rapid Covid Ag    POCT rapid flu A and B    guaiFENesin-codeine (Guaiatussin AC) 100-10 mg/5 mL oral solution; Take 5 mL by mouth 3 (three) times a day as needed for cough    Acute low back pain, unspecified back pain laterality, unspecified whether sciatica present  DDx includes muscle spasm/strain, kidney stone, UTI  Denies dysuria  Patient unable to provide urine sample at this time  We will treat as MSK pathology with conservative measures  Patient advised to return within 1 week if symptoms persist or worsen, with addition of urinary symptoms, hematuria or other systemic symptoms       Return if symptoms worsen or fail to improve.       History of Present Illness   Back Pain  This is a new problem. The current episode started in the past 7 days. The problem occurs intermittently. The problem has been waxing and waning since onset. The pain is present in the lumbar spine. The quality of the pain is described as aching and stabbing. The pain does not radiate. The symptoms are aggravated by bending, position, twisting and coughing. Associated symptoms include headaches. Pertinent negatives include no abdominal pain, bladder incontinence, bowel incontinence, chest pain, dysuria, fever, numbness, paresthesias or weakness. 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 He has tried nothing for the symptoms. The treatment provided no relief.   URI   This is a new problem. The current episode started yesterday. The problem has been gradually worsening. There has been no fever. Associated symptoms include congestion, coughing, headaches and rhinorrhea. Pertinent negatives include no abdominal pain, chest pain, dysuria, nausea, sinus pain, sore throat, swollen glands, vomiting or wheezing. He has tried nothing for the symptoms. The treatment provided no relief.     Patient reports onset of lower back pain 3 to 4 days ago, localized to left flank, without radiation.  Pain is described as dull/aching/stabbing in nature, worsened with movement and pressure.  No recent trauma/fall or injury to the area.    He also developed cold symptoms starting last night with cough, mildly productive + burning sensation in chest with cough.  He has had yellowish nasal discharge + congestion+ headache + body aches and chest heaviness    Denies any fever/chills, shortness of breath, chest tightness/chest pain  Or other associated symptoms at this time.    Positive sick contact with other household members experiencing similar symptoms intermittently over the past several weeks    Of note, the patient reports smoking cessation approximately 2 weeks ago.  Previously was smoking 1 pack/day for approximately 30 years--on and off.      Review of Systems   Constitutional:  Negative for chills and fever.   HENT:  Positive for congestion and rhinorrhea. Negative for sinus pain and sore throat.    Eyes:  Negative for visual disturbance.   Respiratory:  Positive for cough. Negative for chest tightness, shortness of breath and wheezing.    Cardiovascular:  Negative for chest pain and palpitations.   Gastrointestinal:  Negative for abdominal pain, bowel incontinence, nausea and vomiting.   Genitourinary:  Negative for bladder incontinence and dysuria.   Musculoskeletal:  Positive for back pain.  "  Neurological:  Positive for headaches. Negative for dizziness, weakness, numbness and paresthesias.   Psychiatric/Behavioral:  Negative for confusion.        Objective   /86   Pulse 93   Temp 97.9 °F (36.6 °C)   Ht 5' 5\" (1.651 m)   Wt 103 kg (226 lb)   SpO2 99%   BMI 37.61 kg/m²      Physical Exam  Vitals and nursing note reviewed.   Constitutional:       General: He is not in acute distress.     Appearance: Normal appearance. He is not ill-appearing or toxic-appearing.   HENT:      Head: Atraumatic.   Eyes:      General:         Right eye: No discharge.         Left eye: No discharge.      Extraocular Movements: Extraocular movements intact.      Conjunctiva/sclera: Conjunctivae normal.   Cardiovascular:      Rate and Rhythm: Normal rate and regular rhythm.      Pulses: Normal pulses.      Heart sounds: Normal heart sounds.   Pulmonary:      Effort: Pulmonary effort is normal. No respiratory distress.      Breath sounds: Normal breath sounds. No wheezing, rhonchi or rales.   Abdominal:      Tenderness: There is left CVA tenderness. There is no right CVA tenderness.   Musculoskeletal:         General: No swelling or deformity. Normal range of motion.      Cervical back: Normal range of motion.      Lumbar back: Tenderness present.   Skin:     General: Skin is warm and dry.   Neurological:      Mental Status: He is alert and oriented to person, place, and time.   Psychiatric:         Mood and Affect: Mood normal.         Behavior: Behavior normal.         "

## 2025-02-10 ENCOUNTER — OFFICE VISIT (OUTPATIENT)
Dept: INTERNAL MEDICINE CLINIC | Facility: CLINIC | Age: 60
End: 2025-02-10
Payer: COMMERCIAL

## 2025-02-10 VITALS
BODY MASS INDEX: 26.33 KG/M2 | TEMPERATURE: 98.7 F | OXYGEN SATURATION: 99 % | HEART RATE: 77 BPM | DIASTOLIC BLOOD PRESSURE: 70 MMHG | RESPIRATION RATE: 16 BRPM | SYSTOLIC BLOOD PRESSURE: 106 MMHG | HEIGHT: 77 IN | WEIGHT: 223 LBS

## 2025-02-10 DIAGNOSIS — J01.10 ACUTE FRONTAL SINUSITIS, RECURRENCE NOT SPECIFIED: Primary | ICD-10-CM

## 2025-02-10 DIAGNOSIS — R05.1 ACUTE COUGH: ICD-10-CM

## 2025-02-10 DIAGNOSIS — M79.10 MYALGIA: ICD-10-CM

## 2025-02-10 DIAGNOSIS — J06.9 URI WITH COUGH AND CONGESTION: ICD-10-CM

## 2025-02-10 PROCEDURE — 99213 OFFICE O/P EST LOW 20 MIN: CPT

## 2025-02-10 PROCEDURE — 87811 SARS-COV-2 COVID19 W/OPTIC: CPT

## 2025-02-10 PROCEDURE — 87804 INFLUENZA ASSAY W/OPTIC: CPT

## 2025-02-10 RX ORDER — BROMPHENIRAMINE MALEATE, PSEUDOEPHEDRINE HYDROCHLORIDE, AND DEXTROMETHORPHAN HYDROBROMIDE 2; 30; 10 MG/5ML; MG/5ML; MG/5ML
5 SYRUP ORAL 4 TIMES DAILY PRN
Qty: 120 ML | Refills: 0 | Status: SHIPPED | OUTPATIENT
Start: 2025-02-10

## 2025-02-10 RX ORDER — NAPROXEN 500 MG/1
500 TABLET ORAL 2 TIMES DAILY WITH MEALS
Qty: 60 TABLET | Refills: 0 | Status: CANCELLED | OUTPATIENT
Start: 2025-02-10

## 2025-02-10 RX ORDER — CYCLOBENZAPRINE HCL 10 MG
10 TABLET ORAL
Qty: 10 TABLET | Refills: 0 | Status: SHIPPED | OUTPATIENT
Start: 2025-02-10

## 2025-02-10 RX ORDER — NAPROXEN 500 MG/1
500 TABLET ORAL 2 TIMES DAILY WITH MEALS
Qty: 60 TABLET | Refills: 0 | Status: SHIPPED | OUTPATIENT
Start: 2025-02-10

## 2025-02-10 RX ORDER — AZITHROMYCIN 250 MG/1
TABLET, FILM COATED ORAL
Qty: 6 TABLET | Refills: 0 | Status: SHIPPED | OUTPATIENT
Start: 2025-02-10 | End: 2025-02-15

## 2025-02-10 NOTE — PROGRESS NOTES
Name: Shanthi Mackay      : 1965      MRN: [id number]  Encounter Provider: Fouzia Brenner MD  Encounter Date: 2/10/2025   Encounter department: [facility] INTERNAL MEDICINE  :  Assessment & Plan  Acute frontal sinusitis, recurrence not specified  Recommend trial of antibiotics (Z-Darien x 5 days)  Continue supportive measures at home for symptom management       URI with cough and congestion  Rapid tests negative for COVID/flu  New Rx sent for cough medication  Orders:    azithromycin (Zithromax) 250 mg tablet; Take 2 tablets (500 mg total) by mouth daily for 1 day, THEN 1 tablet (250 mg total) daily for 4 days.    Acute cough    Orders:    POCT Rapid Covid Ag    POCT rapid flu A and B    brompheniramine-pseudoephedrine-DM 30-2-10 MG/5ML syrup; Take 5 mL by mouth 4 (four) times a day as needed for congestion or cough    Myalgia  Trial of naproxen (pt advised to take with food)  Trial of MSK relaxant  Orders:    cyclobenzaprine (FLEXERIL) 10 mg tablet; Take 1 tablet (10 mg total) by mouth daily at bedtime as needed for muscle spasms    naproxen (Naprosyn) 500 mg tablet; Take 1 tablet (500 mg total) by mouth 2 (two) times a day with meals    Return if symptoms worsen or fail to improve.     History of Present Illness   HPI  Returns for follow-up visit with persistent URI symptoms.     Cough, cold chills, congestion, can barely stand up - whole house has it     Was offered muscle relaxer but he declined - he thinks he needs it now for his back (body aches are really bad); yellow phlegm from his nose (sinus pressure is really bad)      Positive sick contact with other household members experiencing similar symptoms intermittently over the past several weeks    Denies any fever/chills, shortness of breath, chest tightness/chest pain  Or other associated symptoms at this time.    Review of Systems   Constitutional:  Negative for chills and fever.   HENT:  Positive for congestion and rhinorrhea.  "Negative for sinus pain and sore throat.    Eyes:  Negative for visual disturbance.   Respiratory:  Positive for cough. Negative for chest tightness, shortness of breath and wheezing.    Cardiovascular:  Negative for chest pain and palpitations.   Gastrointestinal:  Negative for abdominal pain, nausea and vomiting.   Genitourinary:  Negative for dysuria.   Musculoskeletal:  Positive for back pain.   Neurological:  Positive for headaches. Negative for dizziness, weakness and numbness.   Psychiatric/Behavioral:  Negative for confusion.        Objective   /70   Pulse 77   Temp 98.7 °F (37.1 °C)   Resp 16   Ht 6' 5\" (1.956 m)   Wt 101 kg (223 lb)   SpO2 99%   BMI 26.44 kg/m²      Physical Exam  Vitals and nursing note reviewed.   Constitutional:       General: He is not in acute distress.     Appearance: Normal appearance. He is not ill-appearing or toxic-appearing.   HENT:      Head: Atraumatic.   Eyes:      General:         Right eye: No discharge.         Left eye: No discharge.      Extraocular Movements: Extraocular movements intact.      Conjunctiva/sclera: Conjunctivae normal.   Cardiovascular:      Rate and Rhythm: Normal rate.      Pulses: Normal pulses.   Pulmonary:      Effort: Pulmonary effort is normal. No respiratory distress.   Abdominal:      Tenderness: There is no right CVA tenderness or left CVA tenderness.   Musculoskeletal:         General: No swelling or deformity. Normal range of motion.      Cervical back: Normal range of motion.      Lumbar back: Tenderness present.   Skin:     General: Skin is warm and dry.   Neurological:      Mental Status: He is alert and oriented to person, place, and time.   Psychiatric:         Mood and Affect: Mood normal.         Behavior: Behavior normal.         "

## 2025-02-10 NOTE — LETTER
February 10, 2025     Patient: Shanthi Mackay  YOB: 1965  Date of Visit: 2/10/2025      To Whom it May Concern:    Shanthi Mackay is under my professional care. Shanthi was seen in my office on 2/10/2025. Please excuse Shanthi from work 2/10/25 - 2/11/25. He may return to work for his next scheduled shift 2/16/25.    If you have any questions or concerns, please don't hesitate to call.         Sincerely,          Fouzia Brenner MD        CC: No Recipients

## 2025-02-13 ENCOUNTER — TELEPHONE (OUTPATIENT)
Age: 60
End: 2025-02-13

## 2025-02-13 NOTE — TELEPHONE ENCOUNTER
PA for BROM PSEU DM SUBMITTED to [employer]    via      [x]bettermarks-Case ID #     [x]PA sent as URGENT    All office notes, labs and other pertaining documents and studies sent. Clinical questions answered. Awaiting determination from insurance company.     Turnaround time for your insurance to make a decision on your Prior Authorization can take 7-21 business days.

## 2025-02-14 NOTE — TELEPHONE ENCOUNTER
PA for BROM PSEU DM DENIED    Reason:(Screenshot if applicable)    SCANNED IN MEDIA        Message sent to office clinical pool Yes    Denial letter scanned into Media Yes    Appeal started No (Provider will need to decide if appeal is warranted and send clinical documentation to Prior Authorization Team for initiation.)    **Please follow up with your patient regarding denial and next steps**

## 2025-02-14 NOTE — TELEPHONE ENCOUNTER
Left message for patient that the Brom Fed was denied by insurance. Dr Brenner rec to take Robitussin DM 2 tspsful QID PRN cough OTC.

## 2025-04-21 ENCOUNTER — OFFICE VISIT (OUTPATIENT)
Dept: INTERNAL MEDICINE CLINIC | Facility: CLINIC | Age: 60
End: 2025-04-21
Payer: COMMERCIAL

## 2025-04-21 VITALS
HEART RATE: 84 BPM | DIASTOLIC BLOOD PRESSURE: 90 MMHG | HEIGHT: 77 IN | BODY MASS INDEX: 26.68 KG/M2 | SYSTOLIC BLOOD PRESSURE: 140 MMHG | WEIGHT: 226 LBS | OXYGEN SATURATION: 96 %

## 2025-04-21 DIAGNOSIS — R35.0 BENIGN PROSTATIC HYPERPLASIA WITH URINARY FREQUENCY: ICD-10-CM

## 2025-04-21 DIAGNOSIS — E03.9 HYPOTHYROIDISM, UNSPECIFIED TYPE: ICD-10-CM

## 2025-04-21 DIAGNOSIS — Z12.5 SCREENING PSA (PROSTATE SPECIFIC ANTIGEN): ICD-10-CM

## 2025-04-21 DIAGNOSIS — N40.1 BENIGN PROSTATIC HYPERPLASIA WITH URINARY FREQUENCY: ICD-10-CM

## 2025-04-21 DIAGNOSIS — E78.2 MIXED HYPERLIPIDEMIA: ICD-10-CM

## 2025-04-21 DIAGNOSIS — Z00.00 ANNUAL PHYSICAL EXAM: Primary | ICD-10-CM

## 2025-04-21 DIAGNOSIS — R73.03 PREDIABETES: ICD-10-CM

## 2025-04-21 DIAGNOSIS — I10 PRIMARY HYPERTENSION: ICD-10-CM

## 2025-04-21 PROBLEM — M25.562 CHRONIC PAIN OF LEFT KNEE: Status: RESOLVED | Noted: 2018-05-29 | Resolved: 2025-04-21

## 2025-04-21 PROBLEM — IMO0001 SMOKING: Status: RESOLVED | Noted: 2017-11-13 | Resolved: 2025-04-21

## 2025-04-21 PROBLEM — K52.9 CHRONIC DIARRHEA: Status: RESOLVED | Noted: 2018-06-23 | Resolved: 2025-04-21

## 2025-04-21 PROBLEM — J45.991 COUGH VARIANT ASTHMA: Status: RESOLVED | Noted: 2023-01-23 | Resolved: 2025-04-21

## 2025-04-21 PROBLEM — G89.29 CHRONIC PAIN OF LEFT KNEE: Status: RESOLVED | Noted: 2018-05-29 | Resolved: 2025-04-21

## 2025-04-21 PROCEDURE — 99396 PREV VISIT EST AGE 40-64: CPT

## 2025-04-21 PROCEDURE — 99214 OFFICE O/P EST MOD 30 MIN: CPT

## 2025-04-21 NOTE — PATIENT INSTRUCTIONS
diary for checking blood pressure at home                  Day 1  Day 2  Day 3  Day 4  Day 5  Day 6  Day 7    Morning  1st read Morning  1st read Morning  1st read Morning  1st read Morning  1st read Morning  1st read Morning  1st read   Systolic: __________ Systolic: __________ Systolic: __________ Systolic: __________ Systolic: __________ Systolic: __________ Systolic: __________   Diastolic: __________ Diastolic: __________ Diastolic: __________ Diastolic: __________ Diastolic: __________ Diastolic: __________ Diastolic: __________   Pulse: __________ Pulse: __________ Pulse: __________ Pulse: __________ Pulse: __________ Pulse: __________ Pulse: __________   Morning  2nd read Morning  2nd read Morning  2nd read Morning  2nd read Morning  2nd read Morning  2nd read Morning  2nd read   Systolic: __________ Systolic: __________ Systolic: __________ Systolic: __________ Systolic: __________ Systolic: __________ Systolic: __________   Diastolic: __________ Diastolic: __________ Diastolic: __________ Diastolic: __________ Diastolic: __________ Diastolic: __________ Diastolic: __________   Pulse: __________ Pulse: __________ Pulse: __________ Pulse: __________ Pulse: __________ Pulse: __________ Pulse: __________   Evening  1st read Evening  1st read Evening  1st read Evening  1st read Evening  1st read Evening  1st read Evening  1st read   Systolic: __________ Systolic: __________ Systolic: __________ Systolic: __________ Systolic: __________ Systolic: __________ Systolic: __________   Diastolic: __________ Diastolic: __________ Diastolic: __________ Diastolic: __________ Diastolic: __________ Diastolic: __________ Diastolic: __________   Pulse: __________ Pulse: __________ Pulse: __________ Pulse: __________ Pulse: __________ Pulse: __________ Pulse: __________   Evening  2nd read Evening  2nd read Evening  2nd read Evening  2nd read Evening  2nd read Evening  2nd read Evening  2nd read   Systolic: __________  "Systolic: __________ Systolic: __________ Systolic: __________ Systolic: __________ Systolic: __________ Systolic: __________   Diastolic: __________ Diastolic: __________ Diastolic: __________ Diastolic: __________ Diastolic: __________ Diastolic: __________ Diastolic: __________   Pulse: __________ Pulse: __________ Pulse: __________ Pulse: __________ Pulse: __________ Pulse: __________ Pulse: __________   Notes    Notes    Notes    Notes    Notes    Notes    Notes      ____________________ ____________________ ____________________ ____________________ ____________________ ____________________ ____________________   ____________________ ____________________ ____________________ ____________________ ____________________ ____________________ ____________________   ____________________ ____________________ ____________________ ____________________ ____________________ ____________________ ____________________   Patient name: ______________________________     Patient ID: ________________________________    Primary care provider: _______________________    Average BP: _______________________________    You can use this table to keep track of your blood pressure (BP) and pulse.  When looking at your home meter, you will probably see at least 3 numbers:  Your \"systolic\" blood pressure: This is the top number of a BP measurement. For example, if your BP is \"140 over 90,\" 140 is the systolic.  Your \"diastolic\" blood pressure: This is the bottom number of a BP measurement. If your BP is \"140 over 90,\" 90 is the diastolic.  Your pulse: This is the number of times your heart beats in 1 minute.  BP: blood pressure.                                        Patient Education     Routine physical for adults   The Basics   Written by the doctors and editors at [facility]   What is a physical? -- A physical is a routine visit, or \"check-up,\" with your doctor. You might also hear it called a \"wellness visit\" or \"preventive " "visit.\"  During each visit, the doctor will:   Ask about your physical and mental health   Ask about your habits, behaviors, and lifestyle   Do an exam   Give you vaccines if needed   Talk to you about any medicines you take   Give advice about your health   Answer your questions  Getting regular check-ups is an important part of taking care of your health. It can help your doctor find and treat any problems you have. But it's also important for preventing health problems.  A routine physical is different from a \"sick visit.\" A sick visit is when you see a doctor because of a health concern or problem. Since physicals are scheduled ahead of time, you can think about what you want to ask the doctor.  How often should I get a physical? -- It depends on your age and health. In general, for people age 21 years and older:   If you are younger than 50 years, you might be able to get a physical every 3 years.   If you are 50 years or older, your doctor might recommend a physical every year.  If you have an ongoing health condition, like diabetes or high blood pressure, your doctor will probably want to see you more often.  What happens during a physical? -- In general, each visit will include:   Physical exam - The doctor or nurse will check your height, weight, heart rate, and blood pressure. They will also look at your eyes and ears. They will ask about how you are feeling and whether you have any symptoms that bother you.   Medicines - It's a good idea to bring a list of all the medicines you take to each doctor visit. Your doctor will talk to you about your medicines and answer any questions. Tell them if you are having any side effects that bother you. You should also tell them if you are having trouble paying for any of your medicines.   Habits and behaviors - This includes:   Your diet   Your exercise habits   Whether you smoke, drink alcohol, or use drugs   Whether you are sexually active   Whether you feel safe at " "home  Your doctor will talk to you about things you can do to improve your health and lower your risk of health problems. They will also offer help and support. For example, if you want to quit smoking, they can give you advice and might prescribe medicines. If you want to improve your diet or get more physical activity, they can help you with this, too.   Lab tests, if needed - The tests you get will depend on your age and situation. For example, your doctor might want to check your:   Cholesterol   Blood sugar   Iron level   Vaccines - The recommended vaccines will depend on your age, health, and what vaccines you already had. Vaccines are very important because they can prevent certain serious or deadly infections.   Discussion of screening - \"Screening\" means checking for diseases or other health problems before they cause symptoms. Your doctor can recommend screening based on your age, risk, and preferences. This might include tests to check for:   Cancer, such as breast, prostate, cervical, ovarian, colorectal, prostate, lung, or skin cancer   Sexually transmitted infections, such as chlamydia and gonorrhea   Mental health conditions like depression and anxiety  Your doctor will talk to you about the different types of screening tests. They can help you decide which screenings to have. They can also explain what the results might mean.   Answering questions - The physical is a good time to ask the doctor or nurse questions about your health. If needed, they can refer you to other doctors or specialists, too.  Adults older than 65 years often need other care, too. As you get older, your doctor will talk to you about:   How to prevent falling at home   Hearing or vision tests   Memory testing   How to take your medicines safely   Making sure that you have the help and support you need at home  All topics are updated as new evidence becomes available and our peer review process is complete.  This topic retrieved " from [employer] on: May 02, 2024.  Topic 491242 Version 1.0  Release: 32.4.3 - C32.122  © 2024 UpToDate, Inc. and/or its affiliates. All rights reserved.  Consumer Information Use and Disclaimer   Disclaimer: This generalized information is a limited summary of diagnosis, treatment, and/or medication information. It is not meant to be comprehensive and should be used as a tool to help the user understand and/or assess potential diagnostic and treatment options. It does NOT include all information about conditions, treatments, medications, side effects, or risks that may apply to a specific patient. It is not intended to be medical advice or a substitute for the medical advice, diagnosis, or treatment of a health care provider based on the health care provider's examination and assessment of a patient's specific and unique circumstances. Patients must speak with a health care provider for complete information about their health, medical questions, and treatment options, including any risks or benefits regarding use of medications. This information does not endorse any treatments or medications as safe, effective, or approved for treating a specific patient. UpToDate, Inc. and its affiliates disclaim any warranty or liability relating to this information or the use thereof.The use of this information is governed by the Terms of Use, available at https://www.wolterskluwer.com/en/know/clinical-effectiveness-terms. 2024© UpToDate, Inc. and its affiliates and/or licensors. All rights reserved.  Copyright   © 2024 UpToDate, Inc. and/or its affiliates. All rights reserved.

## 2025-04-21 NOTE — ASSESSMENT & PLAN NOTE
Lab Results   Component Value Date    HGBA1C 5.6 10/26/2022     Due for repeat --- order placed  Orders:  •  Hemoglobin A1C; Future

## 2025-04-21 NOTE — PROGRESS NOTES
Adult Annual Physical  Name: Shanthi Mackay      : 1965      MRN: [id number]  Encounter Provider: Fouzia Brenner MD  Encounter Date: 2025   Encounter department: [facility] INTERNAL MEDICINE    :  Assessment & Plan  Annual physical exam  Due for annual labs-- orders placed       Primary hypertension  Well controlled off medications  BP slightly elevated today 140/90  Patient does not monitor his pressures at home  Advised to keep blood pressure and follow up for review at next appointment  Orders:  •  CBC and differential; Future  •  Comprehensive metabolic panel; Future    Mixed hyperlipidemia  Lab Results   Component Value Date    CHOLESTEROL 130 10/26/2022    TRIG 95 10/26/2022    HDL 48 10/26/2022    LDLCALC 63 10/26/2022     Due for repeat --- order placed  Orders:  •  Lipid panel; Future    Hypothyroidism, unspecified type  Has not been compliant with medication  Will repeat labs  Orders:  •  TSH, 3rd generation with Free T4 reflex; Future  •  Vitamin D 25 hydroxy; Future    Benign prostatic hyperplasia with urinary frequency  Symptomatic but not bothersome, per patient  Flomax was not effective --- no longer taking  Orders:  •  Ambulatory Referral to Urology; Future    Prediabetes  Lab Results   Component Value Date    HGBA1C 5.6 10/26/2022     Due for repeat --- order placed  Orders:  •  Hemoglobin A1C; Future    Screening PSA (prostate specific antigen)    Orders:  •  PSA Total (Reflex To Free); Future    Preventive Screenings:  - Diabetes Screening: risks/benefits discussed and orders placed  - Cholesterol Screening: has hyperlipidemia, risks/benefits discussed and orders placed   - Hepatitis C screening: patient declines   - HIV screening: patient declines   - Colon cancer screening: screening up-to-date   - Lung cancer screening: screening not indicated   - Prostate cancer screening: risks/benefits discussed and orders placed     Immunizations:    - The patient declines  "recommended vaccines currently despite my recommendations      Counseling/Anticipatory Guidance:  - Alcohol: discussed moderation in alcohol intake and recommendations for healthy alcohol use.   - Drug use: discussed harms of illicit drug use and how it can negatively impact mental/physical health.   - Tobacco use: discussed harms of tobacco use and management options for quitting.   - Dental health: discussed importance of regular tooth brushing, flossing, and dental visits.   - Sexual health: discussed sexually transmitted diseases, partner selection, use of condoms, avoidance of unintended pregnancy, and contraceptive alternatives.   - Diet: discussed recommendations for a healthy/well-balanced diet.   - Exercise: the importance of regular exercise/physical activity was discussed. Recommend exercise 3-5 times per week for at least 30 minutes.   - Injury prevention: discussed safety/seat belts, safety helmets, smoke detectors, carbon monoxide detectors, and smoking near bedding or upholstery.      Return in about 1 month (around 5/21/2025) for Recheck labs and htn.    History of Present Illness     Adult Annual Physical:  Patient presents for annual physical. Patient returns for a follow-up visit and for an annual physical examination. He reports no specific complaints.     Has not been taking his prescribed thyroid medication.    The patient reports occasional back pain, which he attributes to age and activity. He experiences urinary urgency, particularly in the mornings, stating \"I have to go right to the bathroom\" upon waking. He previously took Flomax for urinary symptoms but did not notice a significant difference while on the medication.    Regarding lifestyle factors, Mr. Mackay reports getting adequate sleep, typically going to bed around 9-10 PM and waking up at 5-6 AM. He describes his sleep as \"pretty good\" but acknowledges some days are better than others in terms of feeling rested. He maintains an " "active lifestyle through his work but does not engage in formal exercise.    Mr. Mackay wears dentures and notes concerns about bone loss in his jaw, stating his \"smile line is starting to get deeper.\"    Denies any other concerning symptoms including CP, SOB, HA, dizziness, abdominal pain, N/V, paresthesia or weakness.     Diet and Physical Activity:  - Diet/Nutrition: well balanced diet.  - Exercise: walking, moderate cardiovascular exercise and strength training exercises.    General Health:  - Sleep: 7-8 hours of sleep on average.  - Hearing: normal hearing bilateral ears.  - Vision: wears contacts and most recent eye exam < 1 year ago.  - Dental: regular dental visits and brushes teeth twice daily.     Health:    - Urinary symptoms: post-void dribbling and urinary urgency.     Advanced Care Planning:  - Has an advanced directive?: no    - Has a durable medical POA?: no    - ACP document given to patient?: no      Review of Systems   Constitutional:  Negative for chills and fever.   HENT:  Positive for congestion and rhinorrhea. Negative for postnasal drip and sore throat.    Eyes:  Negative for visual disturbance.   Respiratory:  Negative for cough and shortness of breath.    Cardiovascular:  Negative for chest pain.   Gastrointestinal:  Negative for abdominal pain.   Genitourinary:  Positive for urgency. Negative for hematuria.   Musculoskeletal:  Positive for arthralgias and back pain. Negative for neck pain.   Allergic/Immunologic: Positive for environmental allergies.   Neurological:  Negative for dizziness, weakness and headaches.   Psychiatric/Behavioral:  Negative for confusion and sleep disturbance.      Pertinent Medical History       Medical History Reviewed by provider this encounter:  Tobacco  Allergies  Meds  Problems  Med Hx  Surg Hx  Fam Hx     .  Past Medical History   Past Medical History:   Diagnosis Date   • Arthritis    • Contact lens/glasses fitting    • Disease of thyroid gland     " hypo   • Dry skin     hands/feet   • GERD (gastroesophageal reflux disease)    • Vitamin D deficiency      Past Surgical History:   Procedure Laterality Date   • COLONOSCOPY N/A 12/22/2017    Procedure: COLONOSCOPY;  Surgeon: Grayson Mercado MD;  Location: [facility] GI LAB;  Service: Gastroenterology   • ESOPHAGOGASTRODUODENOSCOPY N/A 12/22/2017    Procedure: ESOPHAGOGASTRODUODENOSCOPY (EGD);  Surgeon: Grayson Mercado MD;  Location: [facility] GI LAB;  Service: Gastroenterology   • WV EXC LESION TDN SHTH/JT CAPSL HAND/FNGR Left 11/23/2022    Procedure: EXCISION GANGLION CYST - left index finger osteophyte excision;  Surgeon: Vickey Parker MD;  Location: [facility] MAIN OR;  Service: Orthopedics   • REPLACEMENT TOTAL KNEE Right      Family History   Problem Relation Age of Onset   • Cancer Mother         stomach   • Hypertension Mother    • Liver cancer Mother    • Cancer Father         colon   • Liver cancer Father    • Hypertension Sister    • No Known Problems Brother    • No Known Problems Daughter    • Leukemia Son    • No Known Problems Brother    • No Known Problems Daughter    • No Known Problems Son       reports that he quit smoking about 2 years ago. His smoking use included cigarettes. He started smoking about 22 years ago. He has a 10 pack-year smoking history. He has never used smokeless tobacco. He reports that he does not currently use alcohol. He reports that he does not use drugs.  Current Outpatient Medications   Medication Instructions   • levothyroxine (SYNTHROID) 75 mcg, Oral, Daily (early morning)   • tamsulosin (FLOMAX) 0.4 mg Take 2 pills AT bedtime   No Known Allergies   Current Outpatient Medications on File Prior to Visit   Medication Sig Dispense Refill   • levothyroxine (Synthroid) 75 mcg tablet Take 1 tablet (75 mcg total) by mouth daily in the early morning (Patient not taking: Reported on 2/7/2025) 90 tablet 1   • tamsulosin (FLOMAX) 0.4 mg Take 2 pills AT bedtime (Patient not taking:  "Reported on 2025) 180 capsule 1     No current facility-administered medications on file prior to visit.      Social History     Tobacco Use   • Smoking status: Former     Current packs/day: 0.00     Average packs/day: 0.5 packs/day for 20.0 years (10.0 ttl pk-yrs)     Types: Cigarettes     Start date: 10/30/2002     Quit date: 10/30/2022     Years since quittin.4   • Smokeless tobacco: Never   Vaping Use   • Vaping status: Never Used   Substance and Sexual Activity   • Alcohol use: Not Currently     Comment: socially   • Drug use: No   • Sexual activity: Not on file       Objective   /90   Pulse 84   Ht 6' 5\" (1.956 m)   Wt 103 kg (226 lb)   SpO2 96%   BMI 26.80 kg/m²     Physical Exam  Vitals and nursing note reviewed.   Constitutional:       General: He is not in acute distress.     Appearance: Normal appearance. He is normal weight. He is not ill-appearing or toxic-appearing.   HENT:      Head: Normocephalic and atraumatic.      Right Ear: Tympanic membrane, ear canal and external ear normal. There is no impacted cerumen.      Left Ear: Tympanic membrane, ear canal and external ear normal. There is no impacted cerumen.      Nose: Nose normal. No congestion.      Mouth/Throat:      Mouth: Mucous membranes are moist.      Pharynx: Oropharynx is clear. No posterior oropharyngeal erythema.   Eyes:      General:         Right eye: No discharge.         Left eye: No discharge.      Extraocular Movements: Extraocular movements intact.      Conjunctiva/sclera: Conjunctivae normal.   Cardiovascular:      Rate and Rhythm: Normal rate and regular rhythm.      Pulses: Normal pulses.      Heart sounds: Normal heart sounds.   Pulmonary:      Effort: Pulmonary effort is normal.      Breath sounds: Normal breath sounds.   Abdominal:      General: Bowel sounds are normal.      Palpations: Abdomen is soft.      Tenderness: There is no abdominal tenderness.   Musculoskeletal:         General: Normal range of " motion.      Cervical back: Normal range of motion and neck supple. No tenderness.      Right lower leg: No edema.      Left lower leg: No edema.   Lymphadenopathy:      Cervical: No cervical adenopathy.   Skin:     General: Skin is warm and dry.   Neurological:      Mental Status: He is alert and oriented to person, place, and time.      Motor: No weakness.      Coordination: Coordination normal.      Gait: Gait normal.   Psychiatric:         Mood and Affect: Mood normal.         Behavior: Behavior normal.

## 2025-04-21 NOTE — ASSESSMENT & PLAN NOTE
Lab Results   Component Value Date    CHOLESTEROL 130 10/26/2022    TRIG 95 10/26/2022    HDL 48 10/26/2022    LDLCALC 63 10/26/2022     Due for repeat --- order placed  Orders:  •  Lipid panel; Future

## 2025-04-21 NOTE — ASSESSMENT & PLAN NOTE
Well controlled off medications  BP slightly elevated today 140/90  Patient does not monitor his pressures at home  Advised to keep blood pressure and follow up for review at next appointment  Orders:  •  CBC and differential; Future  •  Comprehensive metabolic panel; Future

## 2025-04-21 NOTE — ASSESSMENT & PLAN NOTE
Has not been compliant with medication  Will repeat labs  Orders:  •  TSH, 3rd generation with Free T4 reflex; Future  •  Vitamin D 25 hydroxy; Future

## 2025-04-21 NOTE — ASSESSMENT & PLAN NOTE
Symptomatic but not bothersome, per patient  Flomax was not effective --- no longer taking  Orders:  •  Ambulatory Referral to Urology; Future

## 2025-04-24 ENCOUNTER — TELEPHONE (OUTPATIENT)
Age: 60
End: 2025-04-24

## 2025-04-24 NOTE — TELEPHONE ENCOUNTER
New Patient      Insurance: [employer]  Current Insurance? yes    Insurance E-verified? yes  History:   Reason for appointment/active symptoms?     BPH w/Urinary urgency  Has the patient had any previous Urologist(s)?    no  Was the patient seen in the ED? no     Labs/Imaging(Including Out Of Network)? yes     Records Requested? no  Records Visible in EPIC? yes     Personal history of cancer? no     Appointment:   Office location preference:  [place]  ?   Appointment Details:   Date:  05/21/25  Time:  1:40  Location:  [place]  Provider:  Antwan Mcclure PA-C  Does the appointment need further review? no

## 2025-05-01 ENCOUNTER — APPOINTMENT (OUTPATIENT)
Dept: LAB | Facility: CLINIC | Age: 60
End: 2025-05-01

## 2025-05-01 DIAGNOSIS — E78.5 HYPERLIPIDEMIA, UNSPECIFIED HYPERLIPIDEMIA TYPE: ICD-10-CM

## 2025-05-01 DIAGNOSIS — R73.03 PREDIABETES: ICD-10-CM

## 2025-05-01 DIAGNOSIS — I10 PRIMARY HYPERTENSION: Primary | ICD-10-CM

## 2025-05-01 DIAGNOSIS — E03.9 HYPOTHYROIDISM, UNSPECIFIED TYPE: ICD-10-CM

## 2025-05-01 LAB
25(OH)D3 SERPL-MCNC: 14.6 NG/ML (ref 30–100)
ALBUMIN SERPL BCG-MCNC: 3.9 G/DL (ref 3.5–5)
ALP SERPL-CCNC: 56 U/L (ref 34–104)
ALT SERPL W P-5'-P-CCNC: 11 U/L (ref 7–52)
ANION GAP SERPL CALCULATED.3IONS-SCNC: 6 MMOL/L (ref 4–13)
AST SERPL W P-5'-P-CCNC: 14 U/L (ref 13–39)
BASOPHILS # BLD AUTO: 0.02 THOUSANDS/ÂΜL (ref 0–0.1)
BASOPHILS NFR BLD AUTO: 1 % (ref 0–1)
BILIRUB SERPL-MCNC: 0.37 MG/DL (ref 0.2–1)
BUN SERPL-MCNC: 11 MG/DL (ref 5–25)
CALCIUM SERPL-MCNC: 8.9 MG/DL (ref 8.4–10.2)
CHLORIDE SERPL-SCNC: 106 MMOL/L (ref 96–108)
CHOLEST SERPL-MCNC: 123 MG/DL (ref ?–200)
CO2 SERPL-SCNC: 28 MMOL/L (ref 21–32)
CREAT SERPL-MCNC: 1.03 MG/DL (ref 0.6–1.3)
EOSINOPHIL # BLD AUTO: 0.14 THOUSAND/ÂΜL (ref 0–0.61)
EOSINOPHIL NFR BLD AUTO: 3 % (ref 0–6)
ERYTHROCYTE [DISTWIDTH] IN BLOOD BY AUTOMATED COUNT: 14.6 % (ref 11.6–15.1)
GFR SERPL CREATININE-BSD FRML MDRD: 79 ML/MIN/1.73SQ M
GLUCOSE P FAST SERPL-MCNC: 87 MG/DL (ref 65–99)
HCT VFR BLD AUTO: 41.7 % (ref 36.5–49.3)
HDLC SERPL-MCNC: 35 MG/DL
HGB BLD-MCNC: 12.8 G/DL (ref 12–17)
IMM GRANULOCYTES # BLD AUTO: 0.02 THOUSAND/UL (ref 0–0.2)
IMM GRANULOCYTES NFR BLD AUTO: 1 % (ref 0–2)
LDLC SERPL CALC-MCNC: 67 MG/DL (ref 0–100)
LYMPHOCYTES # BLD AUTO: 1.63 THOUSANDS/ÂΜL (ref 0.6–4.47)
LYMPHOCYTES NFR BLD AUTO: 38 % (ref 14–44)
MCH RBC QN AUTO: 26.4 PG (ref 26.8–34.3)
MCHC RBC AUTO-ENTMCNC: 30.7 G/DL (ref 31.4–37.4)
MCV RBC AUTO: 86 FL (ref 82–98)
MONOCYTES # BLD AUTO: 0.28 THOUSAND/ÂΜL (ref 0.17–1.22)
MONOCYTES NFR BLD AUTO: 7 % (ref 4–12)
NEUTROPHILS # BLD AUTO: 2.17 THOUSANDS/ÂΜL (ref 1.85–7.62)
NEUTS SEG NFR BLD AUTO: 50 % (ref 43–75)
NONHDLC SERPL-MCNC: 88 MG/DL
NRBC BLD AUTO-RTO: 0 /100 WBCS
PLATELET # BLD AUTO: 239 THOUSANDS/UL (ref 149–390)
PMV BLD AUTO: 10.9 FL (ref 8.9–12.7)
POTASSIUM SERPL-SCNC: 4.1 MMOL/L (ref 3.5–5.3)
PROT SERPL-MCNC: 6.9 G/DL (ref 6.4–8.4)
RBC # BLD AUTO: 4.85 MILLION/UL (ref 3.88–5.62)
SODIUM SERPL-SCNC: 140 MMOL/L (ref 135–147)
TRIGL SERPL-MCNC: 103 MG/DL (ref ?–150)
TSH SERPL DL<=0.05 MIU/L-ACNC: 2.05 UIU/ML (ref 0.45–4.5)
WBC # BLD AUTO: 4.26 THOUSAND/UL (ref 4.31–10.16)

## 2025-05-01 PROCEDURE — 84443 ASSAY THYROID STIM HORMONE: CPT

## 2025-05-01 PROCEDURE — 83036 HEMOGLOBIN GLYCOSYLATED A1C: CPT

## 2025-05-01 PROCEDURE — 36415 COLL VENOUS BLD VENIPUNCTURE: CPT

## 2025-05-01 PROCEDURE — 80061 LIPID PANEL: CPT

## 2025-05-01 PROCEDURE — 85025 COMPLETE CBC W/AUTO DIFF WBC: CPT

## 2025-05-01 PROCEDURE — 80053 COMPREHEN METABOLIC PANEL: CPT

## 2025-05-01 PROCEDURE — 82306 VITAMIN D 25 HYDROXY: CPT

## 2025-05-02 ENCOUNTER — RESULTS FOLLOW-UP (OUTPATIENT)
Dept: INTERNAL MEDICINE CLINIC | Facility: CLINIC | Age: 60
End: 2025-05-02

## 2025-05-02 DIAGNOSIS — R73.03 PREDIABETES: Primary | ICD-10-CM

## 2025-05-02 LAB
EST. AVERAGE GLUCOSE BLD GHB EST-MCNC: 126 MG/DL
HBA1C MFR BLD: 6 %

## 2025-05-29 ENCOUNTER — TELEPHONE (OUTPATIENT)
Dept: INTERNAL MEDICINE CLINIC | Facility: CLINIC | Age: 60
End: 2025-05-29

## (undated) DEVICE — "MAJ-901 WATER CONTAINER SET CV-160/140": Brand: WATER CONTAINER

## (undated) DEVICE — SUT ETHILON 3-0 PS-1 18 IN 1663G

## (undated) DEVICE — MEDI-VAC YANKAUER SUCTION HANDLE: Brand: CARDINAL HEALTH

## (undated) DEVICE — STERILE BETHLEHEM PLASTIC HAND: Brand: CARDINAL HEALTH

## (undated) DEVICE — DISPOSABLE EQUIPMENT COVER: Brand: SMALL TOWEL DRAPE

## (undated) DEVICE — TUBING BUBBLE CLEAR 5MM X 100 FT NS

## (undated) DEVICE — CUFF TOURNIQUET 18 X 4 IN QUICK CONNECT DISP 1 BLADDER

## (undated) DEVICE — GAUZE SPONGES,16 PLY: Brand: CURITY

## (undated) DEVICE — SINGLE-USE BIOPSY FORCEPS: Brand: RADIAL JAW 4

## (undated) DEVICE — "MB-142 MOUTHPIECE": Brand: MOUTHPIECE

## (undated) DEVICE — SOLIDIFIER FLUID WASTE CONTROL 1500ML

## (undated) DEVICE — GLOVE INDICATOR PI UNDERGLOVE SZ 7 BLUE

## (undated) DEVICE — TUBING AUX CHANNEL

## (undated) DEVICE — DISPOSABLE BIOPSY VALVE MAJ-1555: Brand: SINGLE USE BIOPSY VALVE (STERILE)

## (undated) DEVICE — AIRLIFE™  ADULT CUSHION NASAL CANNULA WITH 7 FOOT (2.1 M) CRUSH-RESISTANT OXYGEN TUBING, AND U/CONNECT-IT ADAPTER: Brand: AIRLIFE™

## (undated) DEVICE — GLOVE EXAM NON-STRL NTRL PLUS LRG PF

## (undated) DEVICE — OCCLUSIVE GAUZE STRIP,3% BISMUTH TRIBROMOPHENATE IN PETROLATUM BLEND: Brand: XEROFORM

## (undated) DEVICE — CURITY STRETCH BANDAGE: Brand: CURITY

## (undated) DEVICE — SPONGE SCRUB 4 PCT CHLORHEXIDINE

## (undated) DEVICE — INTENDED FOR TISSUE SEPARATION, AND OTHER PROCEDURES THAT REQUIRE A SHARP SURGICAL BLADE TO PUNCTURE OR CUT.: Brand: BARD-PARKER ® CARBON RIB-BACK BLADES

## (undated) DEVICE — GLOVE SRG BIOGEL ECLIPSE 7

## (undated) DEVICE — TRAVELKIT CONTAINS FIRST STEP KIT (200ML EP-4 KIT) AND SOILED SCOPE BAG - 1 KIT: Brand: TRAVELKIT CONTAINS FIRST STEP KIT AND SOILED SCOPE BAG

## (undated) DEVICE — BITE BLOCK ENDO 60FR ADLT MAXI  DISP W/STRAP

## (undated) DEVICE — 60 ML SYRINGE,REGULAR TIP: Brand: MONOJECT

## (undated) DEVICE — 1200CC GUARDIAN II: Brand: GUARDIAN

## (undated) DEVICE — "MH-438 A/W VLVE F/140 EVIS-140": Brand: AIR/WATER VALVE

## (undated) DEVICE — LUBRICANT SURGILUBE TUBE 4 OZ  FLIP TOP

## (undated) DEVICE — BRUSH ENDO CLEANING DBL-HEADER

## (undated) DEVICE — "MH-443 SUCTION VALVE F/EVIS140 EVIS160": Brand: SUCTION VALVE